# Patient Record
Sex: FEMALE | Race: AMERICAN INDIAN OR ALASKA NATIVE | Employment: OTHER | URBAN - METROPOLITAN AREA
[De-identification: names, ages, dates, MRNs, and addresses within clinical notes are randomized per-mention and may not be internally consistent; named-entity substitution may affect disease eponyms.]

---

## 2017-06-06 DIAGNOSIS — E03.9 HYPOTHYROIDISM, UNSPECIFIED TYPE: ICD-10-CM

## 2017-06-06 RX ORDER — LEVOTHYROXINE SODIUM 75 UG/1
75 TABLET ORAL DAILY
Qty: 90 TABLET | Refills: 0 | Status: SHIPPED | OUTPATIENT
Start: 2017-06-06 | End: 2017-08-21

## 2017-06-06 NOTE — TELEPHONE ENCOUNTER
Medication filled for 90 days to complete last refill order as pharmacy states their order is .    Last TSH WNL at 1.44 on 16.    Angle Rivera RN

## 2017-06-06 NOTE — TELEPHONE ENCOUNTER
Reason for Call:  Other     Detailed comments: [facility] is saying her Levothyroxine rx is   Looks like it should be good until , she has filled it 3 times  /, , and     Phone Number Patient can be reached at: Home number on file [phone] (home)    Best Time: anytime    Can we leave a detailed message on this number? YES    Call taken on 2017 at 8:17 AM by Maria C Duffy

## 2017-06-06 NOTE — TELEPHONE ENCOUNTER
I called [facility]  Our 2016 Rx was PRINTED.  They have that pt brought a written script in May 2016 so .      I called pt.  She doesn't know what  May is all about.  She said she gets her prescription in August  ONLY & ALWAYS.     Future appt schedule 17.  Please send in a levthyroxine script to tide her over.  Pended.

## 2017-08-21 ENCOUNTER — OFFICE VISIT (OUTPATIENT)
Dept: FAMILY MEDICINE | Facility: CLINIC | Age: 82
End: 2017-08-21
Payer: MEDICARE

## 2017-08-21 VITALS
BODY MASS INDEX: 24.1 KG/M2 | WEIGHT: 136 LBS | DIASTOLIC BLOOD PRESSURE: 60 MMHG | OXYGEN SATURATION: 96 % | SYSTOLIC BLOOD PRESSURE: 113 MMHG | HEIGHT: 63 IN | HEART RATE: 72 BPM | TEMPERATURE: 96.8 F

## 2017-08-21 DIAGNOSIS — R01.1 HEART MURMUR: ICD-10-CM

## 2017-08-21 DIAGNOSIS — Z00.00 ROUTINE GENERAL MEDICAL EXAMINATION AT A HEALTH CARE FACILITY: Primary | ICD-10-CM

## 2017-08-21 DIAGNOSIS — H91.93 UNSPECIFIED HEARING LOSS, BILATERAL: ICD-10-CM

## 2017-08-21 DIAGNOSIS — E03.9 HYPOTHYROIDISM, UNSPECIFIED TYPE: ICD-10-CM

## 2017-08-21 DIAGNOSIS — R42 LIGHTHEADEDNESS: ICD-10-CM

## 2017-08-21 DIAGNOSIS — D33.3 ACOUSTIC NEUROMA (H): ICD-10-CM

## 2017-08-21 LAB
ERYTHROCYTE [DISTWIDTH] IN BLOOD BY AUTOMATED COUNT: 14.1 % (ref 10–15)
HCT VFR BLD AUTO: 39.5 % (ref 35–47)
HGB BLD-MCNC: 12.9 G/DL (ref 11.7–15.7)
MCH RBC QN AUTO: 33.3 PG (ref 26.5–33)
MCHC RBC AUTO-ENTMCNC: 32.7 G/DL (ref 31.5–36.5)
MCV RBC AUTO: 102 FL (ref 78–100)
PLATELET # BLD AUTO: 228 10E9/L (ref 150–450)
RBC # BLD AUTO: 3.87 10E12/L (ref 3.8–5.2)
WBC # BLD AUTO: 8.7 10E9/L (ref 4–11)

## 2017-08-21 PROCEDURE — 84443 ASSAY THYROID STIM HORMONE: CPT | Performed by: INTERNAL MEDICINE

## 2017-08-21 PROCEDURE — 85027 COMPLETE CBC AUTOMATED: CPT | Performed by: INTERNAL MEDICINE

## 2017-08-21 PROCEDURE — G0439 PPPS, SUBSEQ VISIT: HCPCS | Performed by: INTERNAL MEDICINE

## 2017-08-21 PROCEDURE — 80061 LIPID PANEL: CPT | Performed by: INTERNAL MEDICINE

## 2017-08-21 PROCEDURE — 80053 COMPREHEN METABOLIC PANEL: CPT | Performed by: INTERNAL MEDICINE

## 2017-08-21 PROCEDURE — 36415 COLL VENOUS BLD VENIPUNCTURE: CPT | Performed by: INTERNAL MEDICINE

## 2017-08-21 RX ORDER — LEVOTHYROXINE SODIUM 75 UG/1
75 TABLET ORAL DAILY
Qty: 90 TABLET | Refills: 3 | Status: SHIPPED | OUTPATIENT
Start: 2017-08-21 | End: 2018-08-20

## 2017-08-21 NOTE — NURSING NOTE
"Chief Complaint   Patient presents with     Wellness Visit       Initial /60 (BP Location: Right arm, Cuff Size: Adult Regular)  Pulse 72  Temp 96.8  F (36  C) (Tympanic)  Ht 5' 2.75\" (1.594 m)  Wt 136 lb (61.7 kg)  SpO2 96%  Breastfeeding? No  BMI 24.28 kg/m2 Estimated body mass index is 24.28 kg/(m^2) as calculated from the following:    Height as of this encounter: 5' 2.75\" (1.594 m).    Weight as of this encounter: 136 lb (61.7 kg).  Medication Reconciliation: complete   Claudia Vann MA      "

## 2017-08-21 NOTE — MR AVS SNAPSHOT
After Visit Summary   8/21/2017    Teresa Bates    MRN: [id number]           Patient Information     Date Of Birth          10/9/1931        Visit Information        Provider Department      8/21/2017 10:00 AM Shravan Bourgeois MD [facility]        Today's Diagnoses     Routine general medical examination at a health care facility    -  1    Hypothyroidism, unspecified type        Acoustic neuroma (H)        Lightheadedness        Heart murmur        Unspecified hearing loss, bilateral          Care Instructions      Preventive Health Recommendations    Female Ages 65 +    Yearly exam:     See your health care provider every year in order to  o Review health changes.   o Discuss preventive care.    o Review your medicines if your doctor has prescribed any.      You no longer need a yearly Pap test unless you've had an abnormal Pap test in the past 10 years. If you have vaginal symptoms, such as bleeding or discharge, be sure to talk with your provider about a Pap test.      Every 1 to 2 years, have a mammogram.  If you are over 69, talk with your health care provider about whether or not you want to continue having screening mammograms.      Every 10 years, have a colonoscopy. Or, have a yearly FIT test (stool test). These exams will check for colon cancer.       Have a cholesterol test every 5 years, or more often if your doctor advises it.       Have a diabetes test (fasting glucose) every three years. If you are at risk for diabetes, you should have this test more often.       At age 65, have a bone density scan (DEXA) to check for osteoporosis (brittle bone disease).    Shots:    Get a flu shot each year.    Get a tetanus shot every 10 years.    Talk to your doctor about your pneumonia vaccines. There are now two you should receive - Pneumovax (PPSV 23) and Prevnar (PCV 13).    Talk to your doctor about the shingles vaccine.    Talk to your doctor about the hepatitis B  vaccine.    Nutrition:     Eat at least 5 servings of fruits and vegetables each day.      Eat whole-grain bread, whole-wheat pasta and brown rice instead of white grains and rice.      Talk to your provider about Calcium and Vitamin D.     Lifestyle    Exercise at least 150 minutes a week (30 minutes a day, 5 days a week). This will help you control your weight and prevent disease.      Limit alcohol to one drink per day.      No smoking.       Wear sunscreen to prevent skin cancer.       See your dentist twice a year for an exam and cleaning.      See your eye doctor every 1 to 2 years to screen for conditions such as glaucoma, macular degeneration and cataracts.          Follow-ups after your visit        Additional Services     OTOLARYNGOLOGY REFERRAL       Your provider has referred you to: [facility] [phone]   [email or web address]    Dr. Thomas; for hearing loss and hearing testing     Please be aware that coverage of these services is subject to the terms and limitations of your health insurance plan.  Call member services at your health plan with any benefit or coverage questions.      Please bring the following with you to your appointment:    (1) Any X-Rays, CTs or MRIs which have been performed.  Contact the facility where they were done to arrange for  prior to your scheduled appointment.   (2) List of current medications  (3) This referral request   (4) Any documents/labs given to you for this referral                  Future tests that were ordered for you today     Open Future Orders        Priority Expected Expires Ordered    Echocardiogram Complete Routine  8/21/2018 8/21/2017    MR Brain w/o & w Contrast Routine  8/21/2018 8/21/2017    US Carotid Bilateral Routine  8/21/2018 8/21/2017            Who to contact     If you have questions or need follow up information about today's clinic visit or your schedule please contact [facility]  [place] directly at [phone].  Normal or non-critical lab and imaging results will be communicated to you by MyChart, letter or phone within 4 business days after the clinic has received the results. If you do not hear from us within 7 days, please contact the clinic through [employer]hart or phone. If you have a critical or abnormal lab result, we will notify you by phone as soon as possible.  Submit refill requests through [facility] or call your pharmacy and they will forward the refill request to us. Please allow 3 business days for your refill to be completed.          Additional Information About Your Visit        [facility] Information     [facility] lets you send messages to your doctor, view your test results, renew your prescriptions, schedule appointments and more. To sign up, go to [email or web address] . Click on \"Log in\" on the left side of the screen, which will take you to the Welcome page. Then click on \"Sign up Now\" on the right side of the page.     You will be asked to enter the access code listed below, as well as some personal information. Please follow the directions to create your username and password.     Your access code is: [email or web address]  Expires: 2017 10:41 AM     Your access code will  in 90 days. If you need help or a new code, please call your [facility] clinic or [phone].        Care EveryWhere ID     This is your Care EveryWhere ID. This could be used by other organizations to access your [facility] medical records  [id number]        Your Vitals Were     Pulse Temperature Height Pulse Oximetry Breastfeeding? BMI (Body Mass Index)    72 96.8  F (36  C) (Tympanic) 5' 2.75\" (1.594 m) 96% No 24.28 kg/m2       Blood Pressure from Last 3 Encounters:   17 113/60   16 96/67   06/21/10 120/78    Weight from Last 3 Encounters:   17 136 lb (61.7 kg)   16 134 lb (60.8 kg)   06/21/10 131 lb (59.4 kg)              We Performed the Following     CBC with platelets     " Comprehensive metabolic panel     Lipid panel reflex to direct LDL     OTOLARYNGOLOGY REFERRAL     TSH          Where to get your medicines      These medications were sent to [id number] [place] - [address]  [address] [place] [address]     Phone:  [phone]     levothyroxine 75 MCG tablet          Primary Care Provider Office Phone # Fax #    Shravan Bourgeois [phone] [address] [place] [address]        Equal Access to Services     [facility] Center[facility]ONATAN : Hadii aad ku hadasho Soomaali, waaxda luqadaha, qaybta kaalmada adeegyada, waxay idiin hayaan john khmalcom lester . So [facility] [phone].    ATENCIÓN: Si habla español, tiene a landis disposición servicios gratuitos de asistencia lingüística. [facility] [phone].    We comply with applicable federal civil rights laws and [place] laws. We do not discriminate on the basis of race, color, national origin, age, disability sex, sexual orientation or gender identity.            Thank you!     Thank you for choosing [facility]  for your care. Our goal is always to provide you with excellent care. Hearing back from our patients is one way we can continue to improve our services. Please take a few minutes to complete the written survey that you may receive in the mail after your visit with us. Thank you!             Your Updated Medication List - Protect others around you: Learn how to safely use, store and throw away your medicines at www.disposemymeds.org.          This list is accurate as of: 8/21/17 10:41 AM.  Always use your most recent med list.                   Brand Name Dispense Instructions for use Diagnosis    aspirin 81 MG tablet      1 TABLET DAILY        calcium-vitamin D 600-400 MG-UNIT per tablet    CALTRATE     Take 1 tablet by mouth 2 times daily        levothyroxine 75 MCG tablet    SYNTHROID/LEVOTHROID    90 tablet    Take 1 tablet (75 mcg) by mouth daily    Hypothyroidism, unspecified type

## 2017-08-21 NOTE — LETTER
[address] [facility]  [address]  [place]  [address]  Tel: [phone]    August 22, 2017    Teresa Bates  [address]   [facility] [address]        Dear Ms. Bates,    The following letter pertains to your most recent diagnostic tests:    -Liver and gallbladder tests are normal for you. (ALT,AST, Alk phos, bilirubin), kidney function is normal for you (Creatinine, GFR), Sodium is normal, Potassium is normal for you, Calcium is normal for you, Glucose (blood sugar) is normal for you.      -Your cholesterol panel looks healthy.     -TSH (thyroid stimulating hormone) level is normal which indicates normal circulating thyroid hormone levels.      -Your complete blood counts including your hemoglobin returned normal for you.         Bottom line:  Your lab results look healthy       Sincerely,    Shravan Bourgeois MD/SHAHZAD      Enclosure: Lab Results  Results for orders placed or performed in visit on 08/21/17   Comprehensive metabolic panel   Result Value Ref Range    Sodium 139 133 - 144 mmol/L    Potassium 3.8 3.4 - 5.3 mmol/L    Chloride 103 94 - 109 mmol/L    Carbon Dioxide 27 20 - 32 mmol/L    Anion Gap 9 3 - 14 mmol/L    Glucose 87 70 - 99 mg/dL    Urea Nitrogen 18 7 - 30 mg/dL    Creatinine 0.76 0.52 - 1.04 mg/dL    GFR Estimate 72 >60 mL/min/1.7m2    GFR Estimate If Black 87 >60 mL/min/1.7m2    Calcium 8.6 8.5 - 10.1 mg/dL    Bilirubin Total 0.8 0.2 - 1.3 mg/dL    Albumin 3.8 3.4 - 5.0 g/dL    Protein Total 7.4 6.8 - 8.8 g/dL    Alkaline Phosphatase 61 40 - 150 U/L    ALT 29 0 - 50 U/L    AST 31 0 - 45 U/L   CBC with platelets   Result Value Ref Range    WBC 8.7 4.0 - 11.0 10e9/L    RBC Count 3.87 3.8 - 5.2 10e12/L    Hemoglobin 12.9 11.7 - 15.7 g/dL    Hematocrit 39.5 35.0 - 47.0 %     (H) 78 - 100 fl    MCH 33.3 (H) 26.5 - 33.0 pg    MCHC 32.7 31.5 - 36.5 g/dL    RDW 14.1 10.0 - 15.0 %    Platelet Count 228 150 - 450 10e9/L   Lipid panel reflex to direct LDL    Result Value Ref Range    Cholesterol 199 <200 mg/dL    Triglycerides 77 <150 mg/dL    HDL Cholesterol 95 >49 mg/dL    LDL Cholesterol Calculated 89 <100 mg/dL    Non HDL Cholesterol 104 <130 mg/dL   TSH   Result Value Ref Range    TSH 3.73 0.40 - 4.00 mU/L

## 2017-08-21 NOTE — PROGRESS NOTES
SUBJECTIVE:   Teresa Bates is a 85 year old female who presents for Preventive Visit.    Are you in the first 12 months of your Medicare Part B coverage?  No    Healthy Habits:    Do you get at least three servings of calcium containing foods daily (dairy, green leafy vegetables, etc.)? yes    Amount of exercise or daily activities, outside of work: 7 day(s) per week    Problems taking medications regularly No    Medication side effects: No    Have you had an eye exam in the past two years? yes    Do you see a dentist twice per year? yes    Do you have sleep apnea, excessive snoring or daytime drowsiness?no    COGNITIVE SCREEN  1) Repeat 3 items (Banana, Sunrise, Chair)    2) Clock draw: NORMAL  3) 3 item recall: Recalls 2 objects   Results: NORMAL clock, 1-2 items recalled: COGNITIVE IMPAIRMENT LESS LIKELY    Mini-CogTM Copyright S Abhi. Licensed by the author for use in [facility]; reprinted with permission (stacey@[facility]). All rights reserved.        She complains of an 8 month history of of not feeling well  She describes dyspnea with exertion without chest pain  She feels lightheadedness that is intermittent without syncope or near syncope; her friends have told her that she should have an ultrasound of her carotid arteries to check this out  She has trouble sleeping through the night, but she does not want to take medications for this   Her hearing in the right ear is getting worse      Reviewed and updated as needed this visit by clinical staff  Tobacco  Allergies  Meds  Med Hx  Surg Hx  Fam Hx  Soc Hx        Reviewed and updated as needed this visit by Provider  Tobacco  Med Hx  Surg Hx  Fam Hx  Soc Hx       Social History   Substance Use Topics     Smoking status: Former Smoker     Packs/day: 1.00     Years: 30.00     Types: Cigarettes     Smokeless tobacco: Never Used     Alcohol use No       The patient does not drink >3 drinks per day nor >7 drinks per week.    Today's  PHQ-2 Score:   PHQ-2 ( 1999 Pfizer) 8/24/2016   Q1: Little interest or pleasure in doing things 0   Q2: Feeling down, depressed or hopeless 0   PHQ-2 Score 0         Do you feel safe in your environment - Yes    Do you have a Health Care Directive?: Yes: Patient states has Advance Directive and will bring in a copy to clinic.    Current providers sharing in care for this patient include: Patient Care Team:  Shravan Bourgeois MD as PCP - General (Internal Medicine)      Hearing impairment: Yes, hearing managed by specialists    Ability to successfully perform activities of daily living: Yes, no assistance needed     Fall risk:         Home safety:  lack of grab bars in the bathroom      The following health maintenance items are reviewed in Epic and correct as of today:  Health Maintenance   Topic Date Due     ADVANCE DIRECTIVE PLANNING Q5 YRS  10/09/1986     FALL RISK ASSESSMENT  08/24/2017     INFLUENZA VACCINE (SYSTEM ASSIGNED)  09/01/2017     TETANUS IMMUNIZATION (SYSTEM ASSIGNED)  07/11/2021     PNEUMOCOCCAL  Completed     Patient Active Problem List   Diagnosis     Hypothyroidism, unspecified type     Osteopenia     Acoustic neuroma (H)     Past Surgical History:   Procedure Laterality Date     Stereotactic radiation to acustic neuroma on left with Dr. Galindo Left 05/10/200       Social History   Substance Use Topics     Smoking status: Former Smoker     Packs/day: 1.00     Years: 30.00     Types: Cigarettes     Smokeless tobacco: Never Used     Alcohol use No     Family History   Problem Relation Age of Onset     Family History Negative Mother      Myocardial Infarction Father      Multiple Sclerosis Daughter      OSTEOPOROSIS Daughter          Current Outpatient Prescriptions   Medication Sig Dispense Refill     levothyroxine (SYNTHROID/LEVOTHROID) 75 MCG tablet Take 1 tablet (75 mcg) by mouth daily 90 tablet 0     calcium-vitamin D (CALTRATE) 600-400 MG-UNIT per tablet Take 1 tablet by mouth 2 times daily    "    ASPIRIN 81 MG PO TABS 1 TABLET DAILY       No Known Allergies        ROS:  Constitutional, HEENT, cardiovascular, pulmonary, GI, , musculoskeletal, neuro, skin, endocrine and psych systems are negative, except as otherwise noted.      OBJECTIVE:   /60 (BP Location: Right arm, Cuff Size: Adult Regular)  Pulse 72  Temp 96.8  F (36  C) (Tympanic)  Ht 5' 2.75\" (1.594 m)  Wt 136 lb (61.7 kg)  SpO2 96%  Breastfeeding? No  BMI 24.28 kg/m2 Estimated body mass index is 24.28 kg/(m^2) as calculated from the following:    Height as of this encounter: 5' 2.75\" (1.594 m).    Weight as of this encounter: 136 lb (61.7 kg).  EXAM:   GENERAL APPEARANCE: healthy, alert and no distress  EYES: Eyes grossly normal to inspection, PERRL and conjunctivae and sclerae normal  HENT: ear canals and TM's normal, nose and mouth without ulcers or lesions, oropharynx clear and oral mucous membranes moist  NECK: no adenopathy, no asymmetry, masses, or scars and thyroid normal to palpation  RESP: lungs clear to auscultation - no rales, rhonchi or wheezes  BREAST: She has annual women's health check with GYN, so she declined breast exam today  CV: regular rate and rhythm, normal S1 S2, no S3 or S4, I hear a systolic 2/6 murmur at the RUSB, click or rub, no peripheral edema and peripheral pulses strong  ABDOMEN: soft, nontender, no hepatosplenomegaly, no masses and bowel sounds normal  MS: no musculoskeletal defects are noted and gait is age appropriate without ataxia  SKIN: no suspicious lesions or rashes  NEURO: Normal strength and tone, sensory exam grossly normal, mentation intact and speech normal; she is more hard of hearing than last year   PSYCH: mentation appears normal and affect normal/bright    ASSESSMENT / PLAN:   1. Routine general medical examination at a health care facility    - Comprehensive metabolic panel  - CBC with platelets  - Lipid panel reflex to direct LDL    2. Hypothyroidism, unspecified type    - " "levothyroxine (SYNTHROID/LEVOTHROID) 75 MCG tablet; Take 1 tablet (75 mcg) by mouth daily  Dispense: 90 tablet; Refill: 3  - TSH    3. Acoustic neuroma (H)    - MR Brain w/o & w Contrast; Future    4. Lightheadedness  Exclude high grade carotid disease, but this is an unlikely presentation for that  Her new murmur and HERNANDES and lightheadedness could result from new valvular disease check echo   - US Carotid Bilateral; Future  - Echocardiogram Complete; Future    5. Heart murmur    - Echocardiogram Complete; Future    6. Unspecified hearing loss, bilateral  I think she may want a hearing aid for her right ear, she cannot hear out of her left ear due to her neuroma   - OTOLARYNGOLOGY REFERRAL    End of Life Planning:  Patient currently has an advanced directive: Yes.  Practitioner is supportive of decision.    COUNSELING:  Reviewed preventive health counseling, as reflected in patient instructions  Special attention given to:       Regular exercise       Healthy diet/nutrition        Estimated body mass index is 24.28 kg/(m^2) as calculated from the following:    Height as of this encounter: 5' 2.75\" (1.594 m).    Weight as of this encounter: 136 lb (61.7 kg).     reports that she has quit smoking. Her smoking use included Cigarettes. She has a 30.00 pack-year smoking history. She has never used smokeless tobacco.        Appropriate preventive services were discussed with this patient, including applicable screening as appropriate for cardiovascular disease, diabetes, osteopenia/osteoporosis, and glaucoma.  As appropriate for age/gender, discussed screening for colorectal cancer, prostate cancer, breast cancer, and cervical cancer. Checklist reviewing preventive services available has been given to the patient.    Reviewed patients plan of care and provided an AVS. The Basic Care Plan (routine screening as documented in Health Maintenance) for Teresa meets the Care Plan requirement. This Care Plan has been established " and reviewed with the Patient.    Counseling Resources:  ATP IV Guidelines  Pooled Cohorts Equation Calculator  Breast Cancer Risk Calculator  FRAX Risk Assessment  ICSI Preventive Guidelines  Dietary Guidelines for Americans, 2010  USDA's MyPlate  ASA Prophylaxis  Lung CA Screening    Shravan Bourgeois MD  [facility]

## 2017-08-22 LAB
ALBUMIN SERPL-MCNC: 3.8 G/DL (ref 3.4–5)
ALP SERPL-CCNC: 61 U/L (ref 40–150)
ALT SERPL W P-5'-P-CCNC: 29 U/L (ref 0–50)
ANION GAP SERPL CALCULATED.3IONS-SCNC: 9 MMOL/L (ref 3–14)
AST SERPL W P-5'-P-CCNC: 31 U/L (ref 0–45)
BILIRUB SERPL-MCNC: 0.8 MG/DL (ref 0.2–1.3)
BUN SERPL-MCNC: 18 MG/DL (ref 7–30)
CALCIUM SERPL-MCNC: 8.6 MG/DL (ref 8.5–10.1)
CHLORIDE SERPL-SCNC: 103 MMOL/L (ref 94–109)
CHOLEST SERPL-MCNC: 199 MG/DL
CO2 SERPL-SCNC: 27 MMOL/L (ref 20–32)
CREAT SERPL-MCNC: 0.76 MG/DL (ref 0.52–1.04)
GFR SERPL CREATININE-BSD FRML MDRD: 72 ML/MIN/1.7M2
GLUCOSE SERPL-MCNC: 87 MG/DL (ref 70–99)
HDLC SERPL-MCNC: 95 MG/DL
LDLC SERPL CALC-MCNC: 89 MG/DL
NONHDLC SERPL-MCNC: 104 MG/DL
POTASSIUM SERPL-SCNC: 3.8 MMOL/L (ref 3.4–5.3)
PROT SERPL-MCNC: 7.4 G/DL (ref 6.8–8.8)
SODIUM SERPL-SCNC: 139 MMOL/L (ref 133–144)
TRIGL SERPL-MCNC: 77 MG/DL
TSH SERPL DL<=0.005 MIU/L-ACNC: 3.73 MU/L (ref 0.4–4)

## 2017-08-22 NOTE — PROGRESS NOTES
The following letter pertains to your most recent diagnostic tests:    -Liver and gallbladder tests are normal for you. (ALT,AST, Alk phos, bilirubin), kidney function is normal for you (Creatinine, GFR), Sodium is normal, Potassium is normal for you, Calcium is normal for you, Glucose (blood sugar) is normal for you.      -Your cholesterol panel looks healthy.     -TSH (thyroid stimulating hormone) level is normal which indicates normal circulating thyroid hormone levels.      -Your complete blood counts including your hemoglobin returned normal for you.         Bottom line:  Your lab results look healthy             Sincerely,    Dr. Bourgeois

## 2017-08-26 ENCOUNTER — HOSPITAL ENCOUNTER (OUTPATIENT)
Dept: ULTRASOUND IMAGING | Facility: CLINIC | Age: 82
End: 2017-08-26
Attending: INTERNAL MEDICINE
Payer: MEDICARE

## 2017-08-26 ENCOUNTER — HOSPITAL ENCOUNTER (OUTPATIENT)
Dept: MRI IMAGING | Facility: CLINIC | Age: 82
Discharge: HOME | End: 2017-08-26
Attending: INTERNAL MEDICINE | Admitting: INTERNAL MEDICINE
Payer: MEDICARE

## 2017-08-26 DIAGNOSIS — D33.3 ACOUSTIC NEUROMA (H): ICD-10-CM

## 2017-08-26 DIAGNOSIS — R42 LIGHTHEADEDNESS: ICD-10-CM

## 2017-08-26 PROCEDURE — A9585 GADOBUTROL INJECTION: HCPCS | Performed by: INTERNAL MEDICINE

## 2017-08-26 PROCEDURE — 70553 MRI BRAIN STEM W/O & W/DYE: CPT

## 2017-08-26 PROCEDURE — 93880 EXTRACRANIAL BILAT STUDY: CPT

## 2017-08-26 PROCEDURE — 25000128 H RX IP 250 OP 636: Performed by: INTERNAL MEDICINE

## 2017-08-26 RX ORDER — GADOBUTROL 604.72 MG/ML
6 INJECTION INTRAVENOUS ONCE
Status: COMPLETED | OUTPATIENT
Start: 2017-08-26 | End: 2017-08-26

## 2017-08-26 RX ADMIN — GADOBUTROL 6 ML: 604.72 INJECTION INTRAVENOUS at 10:53

## 2017-08-26 NOTE — LETTER
[facility]  [address] [facility]  [address]  [place]  [address]  Tel: [phone]    August 29, 2017    Teresa Bates  [address]   [facility] [address]        Dear Ms. Bates,    The following letter pertains to your most recent diagnostic tests:    Good news! 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    Sincerely,    Shravan Bourgeois MD/SHAHZAD    Enclosure: MRI  Results for orders placed or performed during the hospital encounter of 08/26/17   MR Brain w/o & w Contrast    Narrative    MRI OF THE BRAIN WITHOUT AND WITH CONTRAST;  MRI OF THE SKULL BASE WITHOUT AND WITH CONTRAST 8/26/2017 10:54 AM     COMPARISON: Brain MR 7/1/2015.     HISTORY:  Known acoustic neuroma followup. Benign neoplasm of cranial  nerves.    TECHNIQUE: Axial diffusion-weighted with ADC map, T2-weighted,  turboFLAIR and T1-weighted images of the brain and axial T1-weighted  and coronal T2-weighted with fat saturation images centered on the  internal auditory canals were obtained without intravenous contrast.  Following intravenous administration of IV gadolinium (6 mL Gadavist),  axial turboFLAIR images of the brain and axial T1-weighted with fat  saturation and coronal T1-weighted images centered on the internal  auditory canals were obtained.    FINDINGS: There is moderate diffuse cerebral volume loss. There are  numerous scattered focal and extensive confluent periventricular areas  of abnormal T2 signal hyperintensity in the cerebral white matter  bilaterally that are consistent with sequela of chronic small vessel  ischemic disease.     The ventricles and basal cisterns are within normal limits in  configuration given the degree of cerebral volume loss.  There is no  midline shift.  There are no extra-axial fluid collections.  There is  no evidence for stroke or acute intracranial hemorrhage.  There is no  abnormal contrast enhancement in the  brain or its coverings.    Again noted is an irregularly-shaped enhancing nodule completely  filling the left internal auditory canal and extending out into the  left cerebellopontine angle cistern measuring 2.0 x 1.2 cm in the AP  and transverse dimensions, respectively. This remains consistent with  a vestibular schwannoma and has not increased in size from the  comparison study. The contents of the right internal auditory canal  are within normal limits in contour and signal intensity with no  abnormal contrast enhancement.  The labyrinthine structures of the  inner ears bilaterally are normal in contour and signal intensity with  no abnormal contrast enhancement.    There is no sinusitis or mastoiditis.      Impression    IMPRESSION:   1. Diffuse cerebral volume loss and cerebral white matter changes  consistent with chronic small vessel ischemic disease.   2. Stable enhancing lesion in the left internal auditory canal  extending out into the left cerebellopontine angle cistern measuring  2.0 x 1.2 cm in size. This remains consistent with a vestibular  schwannoma.  3. No evidence for acute intracranial pathology. Overall, no  significant change from the comparison study.    TONYA CONNOR MD

## 2017-08-26 NOTE — LETTER
[address] [facility]  [address]  [place]  [address]  Tel: [phone]    August 29, 2017    Teresa Bates  [address]  [facility] [address]        Dear Ms. Bates,    Good news! Your carotid ultrasound does not show dangerous blockages in your carotid arteries.         Sincerely,     Dr. Bourgeois /NB    Results for orders placed or performed during the hospital encounter of 08/26/17   US Carotid Bilateral    Narrative    BILATERAL DUPLEX CAROTID ULTRASOUND 8/26/2017 11:39 AM     HISTORY: Dizziness and giddiness.    COMPARISON: None.    FINDINGS:  There is mild atherosclerotic plaque in the carotid  bifurcations.  Flow velocities and waveforms show less than 50%  diameter stenosis in both the right and left internal carotid arteries  as assessed by each ICA PSV and EDV and ICA/DCCA ratio.  Antegrade  flow is present in both vertebral and external carotid arteries.      Impression    IMPRESSION:  Less than 50% diameter stenosis of both internal carotid  arteries relative to the distal ICA diameters.    TONYA CONNOR MD

## 2017-08-28 ENCOUNTER — HOSPITAL ENCOUNTER (OUTPATIENT)
Dept: CARDIOLOGY | Facility: CLINIC | Age: 82
Discharge: HOME | End: 2017-08-28
Attending: INTERNAL MEDICINE | Admitting: INTERNAL MEDICINE
Payer: MEDICARE

## 2017-08-28 DIAGNOSIS — R01.1 HEART MURMUR: ICD-10-CM

## 2017-08-28 DIAGNOSIS — R42 LIGHTHEADEDNESS: ICD-10-CM

## 2017-08-28 PROBLEM — I35.8 AORTIC VALVE SCLEROSIS: Status: ACTIVE | Noted: 2017-08-28

## 2017-08-28 PROCEDURE — 93306 TTE W/DOPPLER COMPLETE: CPT

## 2017-08-28 PROCEDURE — 93306 TTE W/DOPPLER COMPLETE: CPT | Mod: 26 | Performed by: INTERNAL MEDICINE

## 2017-08-28 NOTE — LETTER
[facility]  [address]  [address]  [place]  [address]  Tel: [phone]    2017    Teresa Bates  [address]  [facility] [address]        Dear Ms. Bates,    Good news! Your echocardiogram shows no dangerous findings.  Your heart murmur is explained by a benign finding known as aortic sclerosis which does not require further evaluation or follow up.       Sincerely,     Dr. Bush /NB  Results for orders placed or performed during the hospital encounter of 17   Echocardiogram Complete    Narrative    [id number]  [id number]  [id number]  [id number]^ELEAZAR^CARLO^ELI        [facility]  [facility]  Echocardiography Laboratory  [address]  [address]  [place] [address]  Phone [phone]  Fax [phone]        Name: TERESA BATES  MRN: [id number]  : 10/09/1931  Study Date: 2017 09:25 AM  Age: 85 yrs  Gender: Female  [place] [facility] [place] For Study: Dizziness and giddiness, Cardiac murmur, unspecified  Ordering Physician: CARLO BUSH  Referring Physician: CARLO BUSH  Performed By: ASHANTI Borges     BSA: 1.6 m2  Height: 62 in  Weight: 136 lb  HR: 68  BP: 120/68 mmHg  _____________________________________________________________________________  __     Procedure  Complete Echo Adult.     _____________________________________________________________________________  __        Interpretation Summary     Left ventricular systolic function is normal.The visual ejection fraction is  estimated at 60-65%.  The right ventricular systolic function is normal.  Aortic valve sclerosis.  _____________________________________________________________________________  __        Left Ventricle  The left ventricle is normal in size. There is normal left ventricular wall  thickness. Left ventricular systolic function is normal. The visual ejection  fraction is estimated at 60-65%. The transmitral  spectral Doppler flow pattern  is suggestive of impaired LV relaxation. E by E prime ratio is between 8 and  15, which is indeterminate for assessment of left ventricular filling  pressures. No regional wall motion abnormalities noted.     Right Ventricle  The right ventricle is normal size. The right ventricular systolic function is  normal.     Atria  The left atrium is mildly dilated. Right atrial size is normal. There is no  color Doppler evidence of an atrial shunt.     Mitral Valve  There is mild mitral annular calcification. There is trace mitral  regurgitation.     Tricuspid Valve  There is trace tricuspid regurgitation. Right ventricular systolic pressure  could not be approximated due to inadequate tricuspid regurgitation.        Aortic Valve  The aortic valve is trileaflet. There is moderate aortic sclerosis of the non-  coronary cusp. There is trace aortic regurgitation. No aortic stenosis is  present.     Pulmonic Valve  There is trace pulmonic valvular regurgitation. There is no pulmonic valvular  stenosis.     Vessels  The aortic root is normal size. Normal size ascending aorta. The IVC is normal  in size and reactivity with respiration, suggesting normal central venous  pressure.     Pericardium  There is no pericardial effusion.     Rhythm  sinus.     _____________________________________________________________________________  __  MMode/2D Measurements & Calculations  IVSd: 0.62 cm  LVIDd: 4.0 cm  LVIDs: 2.7 cm  LVPWd: 0.71 cm  FS: 33.1 %  EDV(Teich): 71.4 ml  ESV(Teich): 27.0 ml  LV mass(C)d: 74.1 grams  LV mass(C)dI: 45.7 grams/m2  Ao root diam: 2.6 cm  LA dimension: 2.9 cm     asc Aorta Diam: 2.5 cm  LA/Ao: 1.1  LVOT diam: 2.0 cm  LVOT area: 3.2 cm2  LA Volume (BP): 43.8 ml  LA Volume Index (BP): 27.0 ml/m2        Doppler Measurements & Calculations  MV E max joe: 74.1 cm/sec  MV A max joe: 88.3 cm/sec  MV E/A: 0.84  MV dec time: 0.21 sec  Pulm Sys Joe: 63.4 cm/sec  Pulm Rivera Joe: 51.0  cm/sec  Pulm NAYELI Revs Joe: 30.0 cm/sec  Pulm S/D: 1.2  Lateral E/e': 12.4     Medial E/e': 11.7           _____________________________________________________________________________  __           Report approved by: Ethel Hernandez 08/28/2017 10:17 AM

## 2017-08-29 NOTE — PROGRESS NOTES
The following letter pertains to your most recent diagnostic tests:    Good news! 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              Sincerely,    Dr. Bourgeois

## 2017-08-29 NOTE — PROGRESS NOTES
The following letter pertains to your most recent diagnostic tests:    Good news! Your echocardiogram shows no dangerous findings.  Your heart murmur is explained by a benign finding known as aortic sclerosis which does not require further evaluation or follow up.              Sincerely,    Dr. Bourgeois

## 2017-08-29 NOTE — PROGRESS NOTES
The following letter pertains to your most recent diagnostic tests:    Good news! Your carotid ultrasound does not show dangerous blockages in your carotid arteries.                  Sincerely,    Dr. Bourgeois

## 2018-08-20 ENCOUNTER — OFFICE VISIT (OUTPATIENT)
Dept: FAMILY MEDICINE | Facility: CLINIC | Age: 83
End: 2018-08-20
Payer: MEDICARE

## 2018-08-20 VITALS
SYSTOLIC BLOOD PRESSURE: 118 MMHG | HEART RATE: 65 BPM | BODY MASS INDEX: 24.45 KG/M2 | WEIGHT: 138 LBS | DIASTOLIC BLOOD PRESSURE: 67 MMHG | OXYGEN SATURATION: 95 % | TEMPERATURE: 98.2 F | HEIGHT: 63 IN

## 2018-08-20 DIAGNOSIS — I35.8 AORTIC VALVE SCLEROSIS: ICD-10-CM

## 2018-08-20 DIAGNOSIS — R51.9 NONINTRACTABLE HEADACHE, UNSPECIFIED CHRONICITY PATTERN, UNSPECIFIED HEADACHE TYPE: ICD-10-CM

## 2018-08-20 DIAGNOSIS — R27.0 ATAXIA: ICD-10-CM

## 2018-08-20 DIAGNOSIS — D33.3 ACOUSTIC NEUROMA (H): ICD-10-CM

## 2018-08-20 DIAGNOSIS — E03.9 HYPOTHYROIDISM, UNSPECIFIED TYPE: ICD-10-CM

## 2018-08-20 DIAGNOSIS — Z00.00 ROUTINE GENERAL MEDICAL EXAMINATION AT A HEALTH CARE FACILITY: Primary | ICD-10-CM

## 2018-08-20 LAB
ALBUMIN SERPL-MCNC: 3.7 G/DL (ref 3.4–5)
ALP SERPL-CCNC: 57 U/L (ref 40–150)
ALT SERPL W P-5'-P-CCNC: 25 U/L (ref 0–50)
ANION GAP SERPL CALCULATED.3IONS-SCNC: 8 MMOL/L (ref 3–14)
AST SERPL W P-5'-P-CCNC: 26 U/L (ref 0–45)
BILIRUB SERPL-MCNC: 0.8 MG/DL (ref 0.2–1.3)
BUN SERPL-MCNC: 17 MG/DL (ref 7–30)
CALCIUM SERPL-MCNC: 8.6 MG/DL (ref 8.5–10.1)
CHLORIDE SERPL-SCNC: 106 MMOL/L (ref 94–109)
CHOLEST SERPL-MCNC: 184 MG/DL
CO2 SERPL-SCNC: 28 MMOL/L (ref 20–32)
CREAT SERPL-MCNC: 0.66 MG/DL (ref 0.52–1.04)
ERYTHROCYTE [DISTWIDTH] IN BLOOD BY AUTOMATED COUNT: 14.3 % (ref 10–15)
ERYTHROCYTE [SEDIMENTATION RATE] IN BLOOD BY WESTERGREN METHOD: 9 MM/H (ref 0–30)
GFR SERPL CREATININE-BSD FRML MDRD: 84 ML/MIN/1.7M2
GLUCOSE SERPL-MCNC: 96 MG/DL (ref 70–99)
HCT VFR BLD AUTO: 39.1 % (ref 35–47)
HDLC SERPL-MCNC: 77 MG/DL
HGB BLD-MCNC: 12.9 G/DL (ref 11.7–15.7)
LDLC SERPL CALC-MCNC: 94 MG/DL
MCH RBC QN AUTO: 33.6 PG (ref 26.5–33)
MCHC RBC AUTO-ENTMCNC: 33 G/DL (ref 31.5–36.5)
MCV RBC AUTO: 102 FL (ref 78–100)
NONHDLC SERPL-MCNC: 107 MG/DL
PLATELET # BLD AUTO: 190 10E9/L (ref 150–450)
POTASSIUM SERPL-SCNC: 3.9 MMOL/L (ref 3.4–5.3)
PROT SERPL-MCNC: 7 G/DL (ref 6.8–8.8)
RBC # BLD AUTO: 3.84 10E12/L (ref 3.8–5.2)
SODIUM SERPL-SCNC: 142 MMOL/L (ref 133–144)
TRIGL SERPL-MCNC: 65 MG/DL
TSH SERPL DL<=0.005 MIU/L-ACNC: 2.24 MU/L (ref 0.4–4)
WBC # BLD AUTO: 6.2 10E9/L (ref 4–11)

## 2018-08-20 PROCEDURE — 84443 ASSAY THYROID STIM HORMONE: CPT | Performed by: INTERNAL MEDICINE

## 2018-08-20 PROCEDURE — 85652 RBC SED RATE AUTOMATED: CPT | Performed by: INTERNAL MEDICINE

## 2018-08-20 PROCEDURE — G0439 PPPS, SUBSEQ VISIT: HCPCS | Performed by: INTERNAL MEDICINE

## 2018-08-20 PROCEDURE — 99214 OFFICE O/P EST MOD 30 MIN: CPT | Mod: 25 | Performed by: INTERNAL MEDICINE

## 2018-08-20 PROCEDURE — 85027 COMPLETE CBC AUTOMATED: CPT | Performed by: INTERNAL MEDICINE

## 2018-08-20 PROCEDURE — 80053 COMPREHEN METABOLIC PANEL: CPT | Performed by: INTERNAL MEDICINE

## 2018-08-20 PROCEDURE — 80061 LIPID PANEL: CPT | Performed by: INTERNAL MEDICINE

## 2018-08-20 PROCEDURE — 36415 COLL VENOUS BLD VENIPUNCTURE: CPT | Performed by: INTERNAL MEDICINE

## 2018-08-20 RX ORDER — LEVOTHYROXINE SODIUM 75 UG/1
75 TABLET ORAL DAILY
Qty: 90 TABLET | Refills: 3 | Status: SHIPPED | OUTPATIENT
Start: 2018-08-20 | End: 2019-08-22

## 2018-08-20 NOTE — LETTER
[address]  [address]  [place]  [address]  Tel: [phone]    August 20, 2018    Teresa Bates  [address]   [facility] [address]        Dear Ms. Bates,    The following letter pertains to your most recent diagnostic tests:     -TSH (thyroid stimulating hormone) level is normal which indicates normal circulating thyroid hormone levels.       -Your cholesterol panel looks healthy.     -Liver and gallbladder tests are normal for you. (ALT,AST, Alk phos, bilirubin), kidney function is normal for you (Creatinine, GFR), Sodium is normal, Potassium is normal for you, Calcium is normal for you, Glucose (blood sugar) is normal for you.       -Your complete blood counts including your hemoglobin returned normal for you.             Sincerely,     Dr. Bourgeois/francisco    Results for orders placed or performed in visit on 08/20/18   Comprehensive metabolic panel   Result Value Ref Range    Sodium 142 133 - 144 mmol/L    Potassium 3.9 3.4 - 5.3 mmol/L    Chloride 106 94 - 109 mmol/L    Carbon Dioxide 28 20 - 32 mmol/L    Anion Gap 8 3 - 14 mmol/L    Glucose 96 70 - 99 mg/dL    Urea Nitrogen 17 7 - 30 mg/dL    Creatinine 0.66 0.52 - 1.04 mg/dL    GFR Estimate 84 >60 mL/min/1.7m2    GFR Estimate If Black >90 >60 mL/min/1.7m2    Calcium 8.6 8.5 - 10.1 mg/dL    Bilirubin Total 0.8 0.2 - 1.3 mg/dL    Albumin 3.7 3.4 - 5.0 g/dL    Protein Total 7.0 6.8 - 8.8 g/dL    Alkaline Phosphatase 57 40 - 150 U/L    ALT 25 0 - 50 U/L    AST 26 0 - 45 U/L   CBC with platelets   Result Value Ref Range    WBC 6.2 4.0 - 11.0 10e9/L    RBC Count 3.84 3.8 - 5.2 10e12/L    Hemoglobin 12.9 11.7 - 15.7 g/dL    Hematocrit 39.1 35.0 - 47.0 %     (H) 78 - 100 fl    MCH 33.6 (H) 26.5 - 33.0 pg    MCHC 33.0 31.5 - 36.5 g/dL    RDW 14.3 10.0 - 15.0 %    Platelet Count 190 150 - 450 10e9/L   TSH   Result Value Ref Range    TSH 2.24 0.40 - 4.00 mU/L   Lipid panel reflex to direct LDL Fasting   Result  Value Ref Range    Cholesterol 184 <200 mg/dL    Triglycerides 65 <150 mg/dL    HDL Cholesterol 77 >49 mg/dL    LDL Cholesterol Calculated 94 <100 mg/dL    Non HDL Cholesterol 107 <130 mg/dL   Erythrocyte sedimentation rate auto   Result Value Ref Range    Sed Rate 9 0 - 30 mm/h         Enclosure: Lab Results

## 2018-08-20 NOTE — PATIENT INSTRUCTIONS
"Please call [facility] at [phone] to schedule your MRI test.        You should get the new shingles vaccine \"SHINGRIX\" (not Zostavax) at your pharmacy.             Preventive Health Recommendations    Female Ages 65 +    Yearly exam:     See your health care provider every year in order to  o Review health changes.   o Discuss preventive care.    o Review your medicines if your doctor has prescribed any.      You no longer need a yearly Pap test unless you've had an abnormal Pap test in the past 10 years. If you have vaginal symptoms, such as bleeding or discharge, be sure to talk with your provider about a Pap test.      Every 1 to 2 years, have a mammogram.  If you are over 69, talk with your health care provider about whether or not you want to continue having screening mammograms.      Every 10 years, have a colonoscopy. Or, have a yearly FIT test (stool test). These exams will check for colon cancer.       Have a cholesterol test every 5 years, or more often if your doctor advises it.       Have a diabetes test (fasting glucose) every three years. If you are at risk for diabetes, you should have this test more often.       At age 65, have a bone density scan (DEXA) to check for osteoporosis (brittle bone disease).    Shots:    Get a flu shot each year.    Get a tetanus shot every 10 years.    Talk to your doctor about your pneumonia vaccines. There are now two you should receive - Pneumovax (PPSV 23) and Prevnar (PCV 13).    Talk to your pharmacist about the shingles vaccine.    Talk to your doctor about the hepatitis B vaccine.    Nutrition:     Eat at least 5 servings of fruits and vegetables each day.      Eat whole-grain bread, whole-wheat pasta and brown rice instead of white grains and rice.      Get adequate Calcium and Vitamin D.     Lifestyle    Exercise at least 150 minutes a week (30 minutes a day, 5 days a week). This will help you control your weight and prevent disease.      Limit " alcohol to one drink per day.      No smoking.       Wear sunscreen to prevent skin cancer.       See your dentist twice a year for an exam and cleaning.      See your eye doctor every 1 to 2 years to screen for conditions such as glaucoma, macular degeneration and cataracts.

## 2018-08-20 NOTE — PROGRESS NOTES
"    SUBJECTIVE:   Teresa Bates is a 86 year old female who presents for Preventive Visit.    Are you in the first 12 months of your Medicare Part B coverage?  No    Healthy Habits:    Do you get at least three servings of calcium containing foods daily (dairy, green leafy vegetables, etc.)? yes and  taking calcium and/or vitamin D supplement: yes     Amount of exercise or daily activities, outside of work: 6-7 day(s) per week    Problems taking medications regularly No    Medication side effects: No    Have you had an eye exam in the past two years? yes    Do you see a dentist twice per year? 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 Does not wear hearing aids - tried them twice but couldn't tolerate.    Fall risk:  Fallen 2 or more times in the past year?: No  Any fall with injury in the past year?: No      2 month history of pressure on right head near right ear progressive now moderate to severe   She believes this may be contributing to her feeling \"not as secure on me feet as I used to be\"  No recent falls     Reviewed and updated as needed this visit by clinical staff  Tobacco  Allergies         Reviewed and updated as needed this visit by Provider        Social History   Substance Use Topics     Smoking status: Former Smoker     Packs/day: 1.00     Years: 30.00     Types: Cigarettes     Smokeless tobacco: Never Used     Alcohol use No       If you drink alcohol do you typically have >3 drinks per day or >7 drinks per week? No                        Today's PHQ-2 Score:   PHQ-2 ( 1999 Pfizer) 8/20/2018 8/24/2016   Q1: Little interest or pleasure in doing things 0 0   Q2: Feeling down, depressed or hopeless 0 0   PHQ-2 Score 0 0       Do you feel " safe in your environment - Yes    Do you have a Health Care Directive?: Yes: Patient states has Advance Directive and will bring in a copy to clinic.    Current providers sharing in care for this patient include:   Patient Care Team:  Shravan Bourgeois MD as PCP - General (Internal Medicine)    The following health maintenance items are reviewed in Epic and correct as of today:  Health Maintenance   Topic Date Due     ADVANCE DIRECTIVE PLANNING Q5 YRS  10/09/1986     FALL RISK ASSESSMENT  08/24/2017     PHQ-2 Q1 YR  08/24/2017     INFLUENZA VACCINE (1) 09/01/2018     TETANUS IMMUNIZATION (SYSTEM ASSIGNED)  07/11/2021     PNEUMOCOCCAL  Completed     BP Readings from Last 3 Encounters:   08/20/18 118/67   08/21/17 113/60   08/24/16 96/67    Wt Readings from Last 3 Encounters:   08/20/18 138 lb (62.6 kg)   08/21/17 136 lb (61.7 kg)   08/24/16 134 lb (60.8 kg)                  Patient Active Problem List   Diagnosis     Hypothyroidism, unspecified type     Osteopenia     Acoustic neuroma (H)     Aortic valve sclerosis     Heart murmur     Past Surgical History:   Procedure Laterality Date     Stereotactic radiation to acustic neuroma on left with Dr. Galindo Left 05/10/200       Social History   Substance Use Topics     Smoking status: Former Smoker     Packs/day: 1.00     Years: 30.00     Types: Cigarettes     Smokeless tobacco: Never Used     Alcohol use No     Family History   Problem Relation Age of Onset     Family History Negative Mother      Myocardial Infarction Father      Multiple Sclerosis Daughter      Osteoperosis Daughter          Current Outpatient Prescriptions   Medication Sig Dispense Refill     ASPIRIN 81 MG PO TABS 1 TABLET DAILY       calcium-vitamin D (CALTRATE) 600-400 MG-UNIT per tablet Take 1 tablet by mouth 2 times daily       Multiple Vitamins-Minerals (CENTRUM SILVER 50+WOMEN PO)        levothyroxine (SYNTHROID/LEVOTHROID) 75 MCG tablet Take 1 tablet (75 mcg) by mouth daily 90 tablet 3  "          ROS:  Constitutional, HEENT, cardiovascular, pulmonary, gi and gu systems are negative, except as otherwise noted.    OBJECTIVE:   /67 (BP Location: Right arm, Cuff Size: Adult Regular)  Pulse 65  Temp 98.2  F (36.8  C) (Tympanic)  Ht 5' 2.75\" (1.594 m)  Wt 138 lb (62.6 kg)  SpO2 95%  Breastfeeding? No  BMI 24.64 kg/m2 Estimated body mass index is 24.64 kg/(m^2) as calculated from the following:    Height as of this encounter: 5' 2.75\" (1.594 m).    Weight as of this encounter: 138 lb (62.6 kg).  EXAM:   GENERAL APPEARANCE: healthy, alert and no distress  EYES: Eyes grossly normal to inspection, PERRL and conjunctivae and sclerae normal  HENT: ear canals and TM's normal, nose and mouth without ulcers or lesions, oropharynx clear and oral mucous membranes moist  NECK: no adenopathy, no asymmetry, masses, or scars and thyroid normal to palpation  RESP: lungs clear to auscultation - no rales, rhonchi or wheezes  BREAST: She has annual women's health check with GYN, so she declined breast exam today  CV: regular rate and rhythm, normal S1 S2, no S3 or S4, I hear a systolic 2/6 murmur at the RUSB, click or rub, no peripheral edema and peripheral pulses strong  ABDOMEN: soft, nontender, no hepatosplenomegaly, no masses and bowel sounds normal  MS: no musculoskeletal defects are noted and gait is age appropriate without ataxia  SKIN: no suspicious lesions or rashes  NEURO: Cranial nerves 2-12 appear grossly intact.   Alert and oriented to person, place and time. Symmetric strength.  No pronator drift.  Normal gait.  Negative Romberg test.  She passes the \"get up and go test\".    PSYCH: mentation appears normal and affect normal/bright    Diagnostic Test Results:  Labs/MRI pending     ASSESSMENT / PLAN:   1. Routine general medical examination at a health care facility    - Lipid panel reflex to direct LDL Fasting    2. Nonintractable headache, unspecified chronicity pattern, unspecified headache " "type  New head pain symptoms associated with gait problems, even though she is not due for surveillance of left acoustic neuroma, I think we should check a brain MRI to evaluate new right sided symptoms ; check ESR too, to risk stratify for PMR/GCA  - MR Brain w/o & w Contrast; Future    3. Ataxia  See discussion above   - MR Brain w/o & w Contrast; Future    4. Acoustic neuroma (H)  Stable last year     5. Hypothyroidism, unspecified type    - levothyroxine (SYNTHROID/LEVOTHROID) 75 MCG tablet; Take 1 tablet (75 mcg) by mouth daily  Dispense: 90 tablet; Refill: 3  - Comprehensive metabolic panel  - CBC with platelets  - TSH    6. Aortic valve sclerosis        End of Life Planning:  Patient currently has an advanced directive: Yes.  Practitioner is supportive of decision.    COUNSELING:  Reviewed preventive health counseling, as reflected in patient instructions  Special attention given to:       Immunizations    Shingrix recommended           BP Readings from Last 1 Encounters:   08/20/18 118/67     Estimated body mass index is 24.64 kg/(m^2) as calculated from the following:    Height as of this encounter: 5' 2.75\" (1.594 m).    Weight as of this encounter: 138 lb (62.6 kg).           reports that she has quit smoking. Her smoking use included Cigarettes. She has a 30.00 pack-year smoking history. She has never used smokeless tobacco.      Appropriate preventive services were discussed with this patient, including applicable screening as appropriate for cardiovascular disease, diabetes, osteopenia/osteoporosis, and glaucoma.  As appropriate for age/gender, discussed screening for colorectal cancer, prostate cancer, breast cancer, and cervical cancer. Checklist reviewing preventive services available has been given to the patient.    Reviewed patients plan of care and provided an AVS. The Basic Care Plan (routine screening as documented in Health Maintenance) for Teresa meets the Care Plan requirement. This Care " Plan has been established and reviewed with the Patient.    Counseling Resources:  ATP IV Guidelines  Pooled Cohorts Equation Calculator  Breast Cancer Risk Calculator  FRAX Risk Assessment  ICSI Preventive Guidelines  Dietary Guidelines for Americans, 2010  USDA's MyPlate  ASA Prophylaxis  Lung CA Screening    Shravan Bourgeois MD  [facility]

## 2018-08-20 NOTE — MR AVS SNAPSHOT
"              After Visit Summary   8/20/2018    Teresa Bates    MRN: [id number]           Patient Information     Date Of Birth          10/9/1931        Visit Information        Provider Department      8/20/2018 10:00 AM Shravan Bourgeois MD [facility]        Today's Diagnoses     Routine general medical examination at a health care facility    -  1    Nonintractable headache, unspecified chronicity pattern, unspecified headache type        Ataxia        Acoustic neuroma (H)        Hypothyroidism, unspecified type        Aortic valve sclerosis          Care Instructions    Please call [facility] at [phone] to schedule your MRI test.        You should get the new shingles vaccine \"SHINGRIX\" (not Zostavax) at your pharmacy.             Preventive Health Recommendations    Female Ages 65 +    Yearly exam:     See your health care provider every year in order to  o Review health changes.   o Discuss preventive care.    o Review your medicines if your doctor has prescribed any.      You no longer need a yearly Pap test unless you've had an abnormal Pap test in the past 10 years. If you have vaginal symptoms, such as bleeding or discharge, be sure to talk with your provider about a Pap test.      Every 1 to 2 years, have a mammogram.  If you are over 69, talk with your health care provider about whether or not you want to continue having screening mammograms.      Every 10 years, have a colonoscopy. Or, have a yearly FIT test (stool test). These exams will check for colon cancer.       Have a cholesterol test every 5 years, or more often if your doctor advises it.       Have a diabetes test (fasting glucose) every three years. If you are at risk for diabetes, you should have this test more often.       At age 65, have a bone density scan (DEXA) to check for osteoporosis (brittle bone disease).    Shots:    Get a flu shot each year.    Get a tetanus shot every 10 years.    Talk to your " doctor about your pneumonia vaccines. There are now two you should receive - Pneumovax (PPSV 23) and Prevnar (PCV 13).    Talk to your pharmacist about the shingles vaccine.    Talk to your doctor about the hepatitis B vaccine.    Nutrition:     Eat at least 5 servings of fruits and vegetables each day.      Eat whole-grain bread, whole-wheat pasta and brown rice instead of white grains and rice.      Get adequate Calcium and Vitamin D.     Lifestyle    Exercise at least 150 minutes a week (30 minutes a day, 5 days a week). This will help you control your weight and prevent disease.      Limit alcohol to one drink per day.      No smoking.       Wear sunscreen to prevent skin cancer.       See your dentist twice a year for an exam and cleaning.      See your eye doctor every 1 to 2 years to screen for conditions such as glaucoma, macular degeneration and cataracts.          Follow-ups after your visit        Follow-up notes from your care team     Return in about 1 year (around 8/20/2019) for Physical Exam.      Future tests that were ordered for you today     Open Future Orders        Priority Expected Expires Ordered    MR Brain w/o & w Contrast Routine  8/20/2019 8/20/2018            Who to contact     If you have questions or need follow up information about today's clinic visit or your schedule please contact [facility] directly at [phone].  Normal or non-critical lab and imaging results will be communicated to you by MyChart, letter or phone within 4 business days after the clinic has received the results. If you do not hear from us within 7 days, please contact the clinic through MyChart or phone. If you have a critical or abnormal lab result, we will notify you by phone as soon as possible.  Submit refill requests through [employer] or call your pharmacy and they will forward the refill request to us. Please allow 3 business days for your refill to be completed.          Additional Information  "About Your Visit        Care EveryWhere ID     This is your Care EveryWhere ID. This could be used by other organizations to access your [facility] medical records  [id number]        Your Vitals Were     Pulse Temperature Height Pulse Oximetry Breastfeeding? BMI (Body Mass Index)    65 98.2  F (36.8  C) (Tympanic) 5' 2.75\" (1.594 m) 95% No 24.64 kg/m2       Blood Pressure from Last 3 Encounters:   08/20/18 118/67   08/21/17 113/60   08/24/16 96/67    Weight from Last 3 Encounters:   08/20/18 138 lb (62.6 kg)   08/21/17 136 lb (61.7 kg)   08/24/16 134 lb (60.8 kg)              We Performed the Following     CBC with platelets     Comprehensive metabolic panel     Erythrocyte sedimentation rate auto     Lipid panel reflex to direct LDL Fasting     TSH          Where to get your medicines      These medications were sent to [facility] - [place], [place] - [address]  [address] [address]     Phone:  [phone]     levothyroxine 75 MCG tablet          Primary Care Provider Office Phone # Fax #    Shravan Bourgeois [phone] [address]  [place] [address]        Equal Access to Services     [facility] Hospital[facility]ATAN : Hadii aad ku hadasho Soomaali, waaxda luqadaha, qaybta kaalmada adeegyada, waxay idiin haytitus lester . So [facility] [phone].    ATENCIÓN: Si habla español, tiene a landis disposición servicios gratuitos de asistencia lingüística. Llame al [phone].    We comply with applicable federal civil rights laws and [place] laws. We do not discriminate on the basis of race, color, national origin, age, disability, sex, sexual orientation, or gender identity.            Thank you!     Thank you for choosing [facility]  for your care. Our goal is always to provide you with excellent care. Hearing back from our patients is one way we can continue to improve our services. Please take a few minutes to complete the written survey that you may receive in " the mail after your visit with us. Thank you!             Your Updated Medication List - Protect others around you: Learn how to safely use, store and throw away your medicines at www.disposemymeds.org.          This list is accurate as of 8/20/18 10:15 AM.  Always use your most recent med list.                   Brand Name Dispense Instructions for use Diagnosis    aspirin 81 MG tablet      1 TABLET DAILY        calcium-vitamin D 600-400 MG-UNIT per tablet    CALTRATE     Take 1 tablet by mouth 2 times daily        CENTRUM SILVER 50+WOMEN PO           levothyroxine 75 MCG tablet    SYNTHROID/LEVOTHROID    90 tablet    Take 1 tablet (75 mcg) by mouth daily    Hypothyroidism, unspecified type

## 2019-08-22 ENCOUNTER — OFFICE VISIT (OUTPATIENT)
Dept: FAMILY MEDICINE | Facility: CLINIC | Age: 84
End: 2019-08-22
Payer: MEDICARE

## 2019-08-22 VITALS
DIASTOLIC BLOOD PRESSURE: 61 MMHG | HEIGHT: 62 IN | SYSTOLIC BLOOD PRESSURE: 114 MMHG | WEIGHT: 137 LBS | HEART RATE: 72 BPM | OXYGEN SATURATION: 96 % | BODY MASS INDEX: 25.21 KG/M2 | TEMPERATURE: 97.9 F

## 2019-08-22 DIAGNOSIS — E03.9 HYPOTHYROIDISM, UNSPECIFIED TYPE: ICD-10-CM

## 2019-08-22 DIAGNOSIS — I35.8 AORTIC VALVE SCLEROSIS: ICD-10-CM

## 2019-08-22 DIAGNOSIS — D33.3 ACOUSTIC NEUROMA (H): ICD-10-CM

## 2019-08-22 DIAGNOSIS — Z00.00 ROUTINE GENERAL MEDICAL EXAMINATION AT A HEALTH CARE FACILITY: Primary | ICD-10-CM

## 2019-08-22 LAB
ALBUMIN SERPL-MCNC: 3.8 G/DL (ref 3.4–5)
ALP SERPL-CCNC: 61 U/L (ref 40–150)
ALT SERPL W P-5'-P-CCNC: 27 U/L (ref 0–50)
ANION GAP SERPL CALCULATED.3IONS-SCNC: 6 MMOL/L (ref 3–14)
AST SERPL W P-5'-P-CCNC: 29 U/L (ref 0–45)
BILIRUB SERPL-MCNC: 0.9 MG/DL (ref 0.2–1.3)
BUN SERPL-MCNC: 14 MG/DL (ref 7–30)
CALCIUM SERPL-MCNC: 8.6 MG/DL (ref 8.5–10.1)
CHLORIDE SERPL-SCNC: 108 MMOL/L (ref 94–109)
CHOLEST SERPL-MCNC: 182 MG/DL
CO2 SERPL-SCNC: 27 MMOL/L (ref 20–32)
CREAT SERPL-MCNC: 0.64 MG/DL (ref 0.52–1.04)
ERYTHROCYTE [DISTWIDTH] IN BLOOD BY AUTOMATED COUNT: 14.2 % (ref 10–15)
GFR SERPL CREATININE-BSD FRML MDRD: 80 ML/MIN/{1.73_M2}
GLUCOSE SERPL-MCNC: 92 MG/DL (ref 70–99)
HCT VFR BLD AUTO: 40 % (ref 35–47)
HDLC SERPL-MCNC: 73 MG/DL
HGB BLD-MCNC: 13.4 G/DL (ref 11.7–15.7)
LDLC SERPL CALC-MCNC: 94 MG/DL
MCH RBC QN AUTO: 34.5 PG (ref 26.5–33)
MCHC RBC AUTO-ENTMCNC: 33.5 G/DL (ref 31.5–36.5)
MCV RBC AUTO: 103 FL (ref 78–100)
NONHDLC SERPL-MCNC: 109 MG/DL
PLATELET # BLD AUTO: 214 10E9/L (ref 150–450)
POTASSIUM SERPL-SCNC: 4.1 MMOL/L (ref 3.4–5.3)
PROT SERPL-MCNC: 7.5 G/DL (ref 6.8–8.8)
RBC # BLD AUTO: 3.88 10E12/L (ref 3.8–5.2)
SODIUM SERPL-SCNC: 141 MMOL/L (ref 133–144)
TRIGL SERPL-MCNC: 74 MG/DL
TSH SERPL DL<=0.005 MIU/L-ACNC: 3.16 MU/L (ref 0.4–4)
WBC # BLD AUTO: 7.9 10E9/L (ref 4–11)

## 2019-08-22 PROCEDURE — 80061 LIPID PANEL: CPT | Performed by: INTERNAL MEDICINE

## 2019-08-22 PROCEDURE — 36415 COLL VENOUS BLD VENIPUNCTURE: CPT | Performed by: INTERNAL MEDICINE

## 2019-08-22 PROCEDURE — 80053 COMPREHEN METABOLIC PANEL: CPT | Performed by: INTERNAL MEDICINE

## 2019-08-22 PROCEDURE — 85027 COMPLETE CBC AUTOMATED: CPT | Performed by: INTERNAL MEDICINE

## 2019-08-22 PROCEDURE — 84443 ASSAY THYROID STIM HORMONE: CPT | Performed by: INTERNAL MEDICINE

## 2019-08-22 PROCEDURE — G0439 PPPS, SUBSEQ VISIT: HCPCS | Performed by: INTERNAL MEDICINE

## 2019-08-22 PROCEDURE — 99213 OFFICE O/P EST LOW 20 MIN: CPT | Mod: 25 | Performed by: INTERNAL MEDICINE

## 2019-08-22 RX ORDER — LEVOTHYROXINE SODIUM 75 UG/1
75 TABLET ORAL DAILY
Qty: 90 TABLET | Refills: 3 | Status: SHIPPED | OUTPATIENT
Start: 2019-08-22 | End: 2020-07-02

## 2019-08-22 ASSESSMENT — MIFFLIN-ST. JEOR: SCORE: 1012.86

## 2019-08-22 ASSESSMENT — ACTIVITIES OF DAILY LIVING (ADL): CURRENT_FUNCTION: NO ASSISTANCE NEEDED

## 2019-08-22 NOTE — LETTER
[place] [address] [facility] [address] [facility]  [address]  [place]  [address]  Tel: [phone]    August 26, 2019    Teresa Bates  [address]   [facility] [address]        Dear Ms. Jana,    The following letter pertains to your most recent diagnostic tests:    -Your cholesterol panel looks healthy.     -TSH (thyroid stimulating hormone) level is normal which indicates normal circulating thyroid hormone levels.      -Liver and gallbladder tests are normal for you. (ALT,AST, Alk phos, bilirubin), kidney function is normal for you (Creatinine, GFR), Sodium is normal, Potassium is normal for you, Calcium is normal for you, Glucose (blood sugar) is normal for you.      -Your complete blood counts including your hemoglobin returned normal for you.         Bottom line:  Lab results look healthy. Schedule your MRI test!   Please call [facility] at [phone] to schedule your MRI.         Follow up:  I will contact you with your MRI results when they are available.         Sincerely,    Dr. Bourgeois/SHAHZAD        Enclosure: Lab Results  Results for orders placed or performed in visit on 08/22/19   Comprehensive metabolic panel   Result Value Ref Range    Sodium 141 133 - 144 mmol/L    Potassium 4.1 3.4 - 5.3 mmol/L    Chloride 108 94 - 109 mmol/L    Carbon Dioxide 27 20 - 32 mmol/L    Anion Gap 6 3 - 14 mmol/L    Glucose 92 70 - 99 mg/dL    Urea Nitrogen 14 7 - 30 mg/dL    Creatinine 0.64 0.52 - 1.04 mg/dL    GFR Estimate 80 >60 mL/min/[1.73_m2]    GFR Estimate If Black >90 >60 mL/min/[1.73_m2]    Calcium 8.6 8.5 - 10.1 mg/dL    Bilirubin Total 0.9 0.2 - 1.3 mg/dL    Albumin 3.8 3.4 - 5.0 g/dL    Protein Total 7.5 6.8 - 8.8 g/dL    Alkaline Phosphatase 61 40 - 150 U/L    ALT 27 0 - 50 U/L    AST 29 0 - 45 U/L   CBC with platelets   Result Value Ref Range    WBC 7.9 4.0 - 11.0 10e9/L    RBC Count 3.88 3.8 - 5.2 10e12/L    Hemoglobin 13.4 11.7 - 15.7 g/dL    Hematocrit 40.0 35.0 - 47.0 %    MCV  103 (H) 78 - 100 fl    MCH 34.5 (H) 26.5 - 33.0 pg    MCHC 33.5 31.5 - 36.5 g/dL    RDW 14.2 10.0 - 15.0 %    Platelet Count 214 150 - 450 10e9/L   Lipid panel reflex to direct LDL Fasting   Result Value Ref Range    Cholesterol 182 <200 mg/dL    Triglycerides 74 <150 mg/dL    HDL Cholesterol 73 >49 mg/dL    LDL Cholesterol Calculated 94 <100 mg/dL    Non HDL Cholesterol 109 <130 mg/dL   TSH with free T4 reflex   Result Value Ref Range    TSH 3.16 0.40 - 4.00 mU/L

## 2019-08-22 NOTE — PATIENT INSTRUCTIONS
"You should get the new shingles vaccine series \"SHINGRIX\" (not Zostavax) at a pharmacy.      Please call [facility] at [phone] to schedule your brain MRI test.   "

## 2019-08-22 NOTE — PROGRESS NOTES
"SUBJECTIVE:   Teresa Bates is a 87 year old female who presents for Preventive Visit.    Are you in the first 12 months of your Medicare coverage?  No    Healthy Habits:     In general, how would you rate your overall health?  Good    Frequency of exercise:  None    Duration of exercise:  Other    Do you usually eat at least 4 servings of fruit and vegetables a day, include whole grains    & fiber and avoid regularly eating high fat or \"junk\" foods?  Yes    Taking medications regularly:  Yes    Barriers to taking medications:  None    Medication side effects:  None    Ability to successfully perform activities of daily living:  No assistance needed    Home Safety:  Lack of grab bars in the bathroom    Hearing Impairment:  Difficulty following a conversation in a noisy restaurant or crowded room, feel that people are mumbling or not speaking clearly, difficult to understand a speaker at a public meeting or Gnosticist service, need to ask people to speak up or repeat themselves and difficulty understanding soft or whispered speech    In the past 6 months, have you been bothered by leaking of urine? Yes    In general, how would you rate your overall mental or emotional health?  Good      PHQ-2 Total Score: 0    Additional concerns today:  Yes      Progressive now moderate to sever unsteadiness and generalized fatigue since last visit   No vertigo, vision change or focal weakness    Do you feel safe in your environment? Yes    Do you have a Health Care Directive? No: Advance care planning reviewed with patient; information given to patient to review.      Fall risk  Fallen 2 or more times in the past year?: No  Any fall with injury in the past year?: No    Cognitive Screening   1) Repeat 3 items (Leader, Season, Table)    2) Clock draw: NORMAL  3) 3 item recall: Recalls 3 objects  Results: 3 items recalled: COGNITIVE IMPAIRMENT LESS LIKELY    Mini-CogTM Copyright S Abhi. Licensed by the author for use in " [facility]; reprinted with permission (solauryn@[facility]). All rights reserved.      Do you have sleep apnea, excessive snoring or daytime drowsiness?: yes, daytime drowsiness    Reviewed and updated as needed this visit by clinical staff  Tobacco  Allergies  Meds  Med Hx  Surg Hx  Fam Hx  Soc Hx        Reviewed and updated as needed this visit by Provider  Tobacco  Med Hx  Surg Hx  Fam Hx  Soc Hx       Social History     Tobacco Use     Smoking status: Former Smoker     Packs/day: 1.00     Years: 30.00     Pack years: 30.00     Types: Cigarettes     Smokeless tobacco: Never Used   Substance Use Topics     Alcohol use: No     Alcohol/week: 0.0 oz     If you drink alcohol do you typically have >3 drinks per day or >7 drinks per week? No    Current providers sharing in care for this patient include:   Patient Care Team:  Shravan Bourgeois MD as PCP - General (Internal Medicine)  Shravan Bourgeois MD as Assigned PCP    The following health maintenance items are reviewed in Epic and correct as of today:  Health Maintenance   Topic Date Due     ADVANCE CARE PLANNING  10/09/1931     ZOSTER IMMUNIZATION (2 of 3) 09/05/2011     FALL RISK ASSESSMENT  08/20/2019     MEDICARE ANNUAL WELLNESS VISIT  08/20/2019     INFLUENZA VACCINE (1) 09/01/2019     DTAP/TDAP/TD IMMUNIZATION (2 - Td) 07/11/2021     PHQ-2  Completed     PNEUMOCOCCAL IMMUNIZATION 65+ LOW/MEDIUM RISK  Completed     IPV IMMUNIZATION  Aged Out     MENINGITIS IMMUNIZATION  Aged Out     Patient Active Problem List   Diagnosis     Hypothyroidism, unspecified type     Osteopenia     Acoustic neuroma (H)     Aortic valve sclerosis     Heart murmur     Past Surgical History:   Procedure Laterality Date     Stereotactic radiation to acustic neuroma on left with Dr. Galindo Left 05/10/200       Social History     Tobacco Use     Smoking status: Former Smoker     Packs/day: 1.00     Years: 30.00     Pack years: 30.00     Types: Cigarettes     Smokeless  "tobacco: Never Used   Substance Use Topics     Alcohol use: No     Alcohol/week: 0.0 oz     Family History   Problem Relation Age of Onset     Family History Negative Mother      Myocardial Infarction Father      Multiple Sclerosis Daughter      Osteoporosis Daughter          Current Outpatient Medications   Medication Sig Dispense Refill     ASPIRIN 81 MG PO TABS 1 TABLET DAILY       calcium-vitamin D (CALTRATE) 600-400 MG-UNIT per tablet Take 1 tablet by mouth 2 times daily       levothyroxine (SYNTHROID/LEVOTHROID) 75 MCG tablet Take 1 tablet (75 mcg) by mouth daily 90 tablet 3     Multiple Vitamins-Minerals (CENTRUM SILVER 50+WOMEN PO)        Allergies   Allergen Reactions     No Known Allergies          Review of Systems  Constitutional, HEENT, cardiovascular, pulmonary, gi and gu systems are negative, except as otherwise noted.    OBJECTIVE:   /61 (BP Location: Right arm, Patient Position: Sitting, Cuff Size: Adult Regular)   Pulse 72   Temp 97.9  F (36.6  C) (Oral)   Ht 1.58 m (5' 2.2\")   Wt 62.1 kg (137 lb)   SpO2 96%   BMI 24.90 kg/m   Estimated body mass index is 24.9 kg/m  as calculated from the following:    Height as of this encounter: 1.58 m (5' 2.2\").    Weight as of this encounter: 62.1 kg (137 lb).  Physical Exam  GENERAL APPEARANCE: healthy, alert and no distress  EYES: Eyes grossly normal to inspection, PERRL and conjunctivae and sclerae normal  HENT: ear canals and TM's normal, nose and mouth without ulcers or lesions, oropharynx clear and oral mucous membranes moist  NECK: no adenopathy, no asymmetry, masses, or scars and thyroid normal to palpation  RESP: lungs clear to auscultation - no rales, rhonchi or wheezes  CV:  Heart with regular rate and rhythm. Unchanged systolic heart murmur   ABDOMEN: soft, nontender, no hepatosplenomegaly, no masses and bowel sounds normal  MS: no musculoskeletal defects are noted and gait is age appropriate without ataxia  SKIN: no suspicious lesions " "or rashes  NEURO: Alert and oriented to person, place and time. No hearing in left ear.  Cranial nerves 2-12 appear otherwise grossly intact.  Romberg test negative.  She fails the 'get up and go' test.  Gait is moderately ataxic.  Strength is symmetric.  Normal tone.    PSYCH: mentation appears normal and affect normal/bright    Diagnostic Test Results:  Labs pending     ASSESSMENT / PLAN:   1. Routine general medical examination at a health care facility    - Comprehensive metabolic panel  - CBC with platelets  - Lipid panel reflex to direct LDL Fasting    2. Hypothyroidism, unspecified type    - levothyroxine (SYNTHROID/LEVOTHROID) 75 MCG tablet; Take 1 tablet (75 mcg) by mouth daily  Dispense: 90 tablet; Refill: 3  - TSH with free T4 reflex    3. Acoustic neuroma (H)  Given progressive symptoms and the fact that she did not have the MRI that was scheduled for last year, schedule MRI today   - MR Brain w/o & w Contrast; Future  - OFFICE/OUTPT VISIT,EST,LEVL III    4. Aortic valve sclerosis  Stable murmur on exam       End of Life Planning:  Patient currently has an advanced directive: No.  I have verified the patient's ablity to prepare an advanced directive/make health care decisions.  Literature was provided to assist patient in preparing an advanced directive.    COUNSELING:  Reviewed preventive health counseling, as reflected in patient instructions  Special attention given to:       Regular exercise       Healthy diet/nutrition       Immunizations    Reminded patient to get Shingrix           Estimated body mass index is 24.9 kg/m  as calculated from the following:    Height as of this encounter: 1.58 m (5' 2.2\").    Weight as of this encounter: 62.1 kg (137 lb).         reports that she has quit smoking. Her smoking use included cigarettes. She has a 30.00 pack-year smoking history. She has never used smokeless tobacco.      Appropriate preventive services were discussed with this patient, including " applicable screening as appropriate for cardiovascular disease, diabetes, osteopenia/osteoporosis, and glaucoma.  As appropriate for age/gender, discussed screening for colorectal cancer, prostate cancer, breast cancer, and cervical cancer. Checklist reviewing preventive services available has been given to the patient.    Reviewed patients plan of care and provided an AVS. The Basic Care Plan (routine screening as documented in Health Maintenance) for Teresa meets the Care Plan requirement. This Care Plan has been established and reviewed with the Patient.    Counseling Resources:  ATP IV Guidelines  Pooled Cohorts Equation Calculator  Breast Cancer Risk Calculator  FRAX Risk Assessment  ICSI Preventive Guidelines  Dietary Guidelines for Americans, 2010  USDA's MyPlate  ASA Prophylaxis  Lung CA Screening    Shravan Bourgeois MD  [facility]    Identified Health Risks:

## 2019-08-23 ENCOUNTER — TELEPHONE (OUTPATIENT)
Dept: FAMILY MEDICINE | Facility: CLINIC | Age: 84
End: 2019-08-23

## 2019-08-23 NOTE — TELEPHONE ENCOUNTER
Upon chart review, writer noted Echo results from 2017:     Notes Recorded by Shravan Bourgeois MD on 8/28/2017 at 10:29 PM  The following letter pertains to your most recent diagnostic tests:    Good news! Your echocardiogram shows no dangerous findings.  Your heart murmur is explained by a benign finding known as aortic sclerosis which does not require further evaluation or follow up.      I explained this note to patient, and that she will always see the diagnosis of Aortic Sclerosis on her list because it is a diagnosis that she has been give, but it is not dangerous or does not require any further follow-up or evaluation (per above). Discussed that Dr. Bourgeois noted a STABLE murmur heard at her exam yesterday, which means that nothing has changed or worsened, so she should not be concerned.     Pt was very appreciative.     Lili LORENZO RN

## 2019-08-23 NOTE — TELEPHONE ENCOUNTER
Reason for Call:  Other     Detailed comments: Pt would like to discuss her appointment on 8/22/19 her after visit bubba gaines an aortic valve sclerosis was discussed.  She does not know what this is, and does not recall it being discussed.  Please call to discuss  Phone Number Patient can be reached at: Home number on file [phone] (home)    Best Time: any    Can we leave a detailed message on this number? YES    Call taken on 8/23/2019 at 1:00 PM by Sarah Doll

## 2019-08-24 NOTE — RESULT ENCOUNTER NOTE
The following letter pertains to your most recent diagnostic tests:    -Your cholesterol panel looks healthy.     -TSH (thyroid stimulating hormone) level is normal which indicates normal circulating thyroid hormone levels.      -Liver and gallbladder tests are normal for you. (ALT,AST, Alk phos, bilirubin), kidney function is normal for you (Creatinine, GFR), Sodium is normal, Potassium is normal for you, Calcium is normal for you, Glucose (blood sugar) is normal for you.      -Your complete blood counts including your hemoglobin returned normal for you.         Bottom line:  Lab results look healthy. Schedule your MRI test!   Please call [facility] at [phone] to schedule your MRI.         Follow up:  I will contact you with your MRI results when they are available.         Sincerely,    Dr. Bourgeois

## 2019-08-28 ENCOUNTER — HOSPITAL ENCOUNTER (OUTPATIENT)
Dept: MRI IMAGING | Facility: CLINIC | Age: 84
Discharge: HOME | End: 2019-08-28
Attending: INTERNAL MEDICINE | Admitting: INTERNAL MEDICINE
Payer: MEDICARE

## 2019-08-28 DIAGNOSIS — D33.3 ACOUSTIC NEUROMA (H): ICD-10-CM

## 2019-08-28 PROCEDURE — A9585 GADOBUTROL INJECTION: HCPCS | Performed by: INTERNAL MEDICINE

## 2019-08-28 PROCEDURE — 25500064 ZZH RX 255 OP 636: Performed by: INTERNAL MEDICINE

## 2019-08-28 PROCEDURE — 70553 MRI BRAIN STEM W/O & W/DYE: CPT

## 2019-08-28 RX ORDER — GADOBUTROL 604.72 MG/ML
6 INJECTION INTRAVENOUS ONCE
Status: DISCONTINUED | OUTPATIENT
Start: 2019-08-28 | End: 2019-08-29 | Stop reason: HOSPADM

## 2019-08-28 RX ORDER — GADOBUTROL 604.72 MG/ML
6 INJECTION INTRAVENOUS ONCE
Status: COMPLETED | OUTPATIENT
Start: 2019-08-28 | End: 2019-08-28

## 2019-08-28 RX ADMIN — GADOBUTROL 6 ML: 604.72 INJECTION INTRAVENOUS at 09:46

## 2019-08-28 NOTE — LETTER
[address] [facility] [address] [facility]  [address]  [place]  [address]  Tel: [phone]    September 3, 2019    Teresa Bates  [address]   [place] [address]        Dear Ms. Bates,    The following letter pertains to your most recent diagnostic tests:    Good news! Your brain MRI shows that your brain mass has actually decreased in size sine last visit.  There are no new findings to explain your imbalance.           Sincerely,    Dr. Bourgeois/SHAHZAD

## 2019-09-01 NOTE — RESULT ENCOUNTER NOTE
The following letter pertains to your most recent diagnostic tests:    Good news! Your brain MRI shows that your brain mass has actually decreased in size sine last visit.  There are no new findings to explain your imbalance.           Sincerely,    Dr. Bourgeois

## 2019-09-03 ENCOUNTER — TELEPHONE (OUTPATIENT)
Dept: FAMILY MEDICINE | Facility: CLINIC | Age: 84
End: 2019-09-03

## 2019-09-03 DIAGNOSIS — R35.0 URINARY FREQUENCY: Primary | ICD-10-CM

## 2019-09-03 NOTE — TELEPHONE ENCOUNTER
Reason for Call:  Other     Detailed comments: patient had a px on 8/22/19 and forgot to ask about getting a handicapped sticker for her car.  She would like to get one. Please advise.     Phone Number Patient can be reached at: Home number on file [phone] (home)    Best Time: any    Can we leave a detailed message on this number? YES    Call taken on 9/3/2019 at 9:25 AM by Bing Selby

## 2019-09-03 NOTE — TELEPHONE ENCOUNTER
TO PCP:     See below, pt reports she forgot to discuss handicapped parking at last OV     Please advise - should she return for a separate OV to discuss?    Arpita ARRIAGA RN

## 2019-11-16 ENCOUNTER — TRANSFERRED RECORDS (OUTPATIENT)
Dept: HEALTH INFORMATION MANAGEMENT | Facility: CLINIC | Age: 84
End: 2019-11-16

## 2020-02-12 ENCOUNTER — TRANSFERRED RECORDS (OUTPATIENT)
Dept: HEALTH INFORMATION MANAGEMENT | Facility: CLINIC | Age: 85
End: 2020-02-12

## 2020-04-16 ENCOUNTER — TELEPHONE (OUTPATIENT)
Dept: FAMILY MEDICINE | Facility: CLINIC | Age: 85
End: 2020-04-16

## 2020-04-16 NOTE — TELEPHONE ENCOUNTER
Reason for Call:  Other records    Detailed comments: Patient had been in [facility] and broken her hip,  She said she requested the records & xrays be sent to Dr Bourgeois, on 2/12 from [facility] in [place]. 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   Have records been received.  An WINTER is in chart, but I don't see records scanned in yet.     Phone Number Patient can be reached at: Home number on file [phone] (home)    Best Time: any    Can we leave a detailed message on this number? YES    Call taken on 4/16/2020 at 11:28 AM by Bing Selby

## 2020-04-16 NOTE — TELEPHONE ENCOUNTER
I don't see these records, can we please obtain the records and then set up a telephone visit to review the results together?

## 2020-04-21 NOTE — TELEPHONE ENCOUNTER
Message left with medical records at [facility] to try and obtain records from patient's stay in February. Signed WINTER is in chart from [facility].    Derick Esposito CMA on 4/21/2020 at 1:27 PM

## 2020-04-21 NOTE — TELEPHONE ENCOUNTER
Dr. Bourgeois,    Have you seen any records on this patient come in?    Derick Esposito, CMA on 4/21/2020 at 9:27 AM

## 2020-04-22 NOTE — TELEPHONE ENCOUNTER
Jennie with [employer] called to have the WINTER emailed to her   Emailed from Fax machine to [email or web address]

## 2020-04-23 ENCOUNTER — TELEPHONE (OUTPATIENT)
Dept: FAMILY MEDICINE | Facility: CLINIC | Age: 85
End: 2020-04-23

## 2020-04-23 NOTE — TELEPHONE ENCOUNTER
Panel Management Review      Patient has the following on her problem list: None      Composite cancer screening  Chart review shows that this patient is due/due soon for the following Influenza Immunization report.  Summary:    Patient is due/failing the following:   Influenza Immunization    Action needed:   Called patient and left VM to call back clinic.    Type of outreach:    Phone, left message for patient to call back.     Questions for provider review:    None                                                                                                                                    Sandy Henley MA on 4/23/2020 at 3:40 PM

## 2020-07-02 ENCOUNTER — VIRTUAL VISIT (OUTPATIENT)
Dept: FAMILY MEDICINE | Facility: CLINIC | Age: 85
End: 2020-07-02
Payer: MEDICARE

## 2020-07-02 DIAGNOSIS — R91.8 PULMONARY NODULES: ICD-10-CM

## 2020-07-02 DIAGNOSIS — S72.009D CLOSED FRACTURE OF HIP WITH ROUTINE HEALING, UNSPECIFIED LATERALITY, SUBSEQUENT ENCOUNTER: Primary | ICD-10-CM

## 2020-07-02 DIAGNOSIS — E03.9 HYPOTHYROIDISM, UNSPECIFIED TYPE: ICD-10-CM

## 2020-07-02 PROCEDURE — 99442 ZZC PHYSICIAN TELEPHONE EVALUATION 11-20 MIN: CPT | Performed by: INTERNAL MEDICINE

## 2020-07-02 RX ORDER — LEVOTHYROXINE SODIUM 75 UG/1
75 TABLET ORAL DAILY
Qty: 90 TABLET | Refills: 3 | Status: SHIPPED | OUTPATIENT
Start: 2020-07-02 | End: 2021-01-01

## 2020-07-02 NOTE — PROGRESS NOTES
"Teresa Bates is a 88 year old female who is being evaluated via a billable telephone visit.      The patient has been notified of following:     \"This telephone visit will be conducted via a call between you and your physician/provider. We have found that certain health care needs can be provided without the need for a physical exam.  This service lets us provide the care you need with a short phone conversation.  If a prescription is necessary we can send it directly to your pharmacy.  If lab work is needed we can place an order for that and you can then stop by our lab to have the test done at a later time.    Telephone visits are billed at different rates depending on your insurance coverage. During this emergency period, for some insurers they may be billed the same as an in-person visit.  Please reach out to your insurance provider with any questions.    If during the course of the call the physician/provider feels a telephone visit is not appropriate, you will not be charged for this service.\"    Patient has given verbal consent for Telephone visit?  Yes    What phone number would you like to be contacted at? [phone]    How would you like to obtain your AVS? Mail a copy    Subjective     Teresa Bates is a 88 year old female who presents via phone visit today for the following health issues:    HPI    Follow up kit Moore is an 88-year-old female with a history of acoustic neuroma who spent the winter in [place] and unfortunately had a fall and broke her femur.  She had an emergency surgery there.  An incidental 0.9 cm lung nodule was noted by her report during that evaluation.  She is using a walker but getting by.  She is frustrated by social distancing although she understands its utility.  She wondered if she needed to have a PET scan to evaluate her nodule?    Patient Active Problem List   Diagnosis     Hypothyroidism, unspecified type     Osteopenia     Acoustic neuroma (H)     " Aortic valve sclerosis     Heart murmur     Past Surgical History:   Procedure Laterality Date     Stereotactic radiation to acustic neuroma on left with Dr. Galindo Left 05/10/200       Social History     Tobacco Use     Smoking status: Former Smoker     Packs/day: 1.00     Years: 30.00     Pack years: 30.00     Types: Cigarettes     Smokeless tobacco: Never Used   Substance Use Topics     Alcohol use: No     Alcohol/week: 0.0 standard drinks     Family History   Problem Relation Age of Onset     Family History Negative Mother      Myocardial Infarction Father      Multiple Sclerosis Daughter      Osteoporosis Daughter          Current Outpatient Medications   Medication Sig Dispense Refill     ASPIRIN 81 MG PO TABS 1 TABLET DAILY       calcium-vitamin D (CALTRATE) 600-400 MG-UNIT per tablet Take 1 tablet by mouth 2 times daily       levothyroxine (SYNTHROID/LEVOTHROID) 75 MCG tablet Take 1 tablet (75 mcg) by mouth daily 90 tablet 3     Multiple Vitamins-Minerals (CENTRUM SILVER 50+WOMEN PO)        Allergies   Allergen Reactions     No Known Allergies        Reviewed and updated as needed this visit by Provider         Review of Systems   Constitutional, HEENT, cardiovascular, pulmonary, gi and gu systems are negative, except as otherwise noted.       Objective   Reported vitals:  There were no vitals taken for this visit.   healthy, alert and no distress  PSYCH: Alert and oriented times 3; coherent speech, normal   rate and volume, able to articulate logical thoughts, able   to abstract reason, no tangential thoughts, no hallucinations   or delusions  Her affect is normal  RESP: No cough, no audible wheezing, able to talk in full sentences  Remainder of exam unable to be completed due to telephone visits            Assessment/Plan:  1. Closed fracture of hip with routine healing, unspecified laterality, subsequent encounter  It seems she is recovering slowly from this unfortunate event    2. Pulmonary  nodules  Options to further evaluate her lung nodule could include a PET scan or a follow-up CT scan at this interval, we discussed the pros and cons of each of these tests.  We also discussed that if the lung nodule is enlarging, the treatment would be an extensive lung surgery.  Is not certain how well she would do with the surgery like that.  At the end of our discussion, we decided to hold off on follow-up imaging until we meet again at the end of August when she has a planned face-to-face appointment with me.  She prefers to delay follow-up treatment due to concern about going in the radiology department during the viral pandemic.  Her questions were answered regarding this finding, she demonstrated good understanding of the pros and cons of follow-up imaging now versus waiting.    3. Hypothyroidism, unspecified type  She needs a refill on her levothyroxine, check thyroid function test when she returns  - levothyroxine (SYNTHROID/LEVOTHROID) 75 MCG tablet; Take 1 tablet (75 mcg) by mouth daily  Dispense: 90 tablet; Refill: 3    Return in about 8 weeks (around 8/26/2020) for Preventive Visit.  Patient instructed to return to clinic or contact us sooner if symptoms worsen or new symptoms develop.       Phone call duration:  12 minutes    Shravan Bourgeois MD

## 2020-08-15 ENCOUNTER — APPOINTMENT (OUTPATIENT)
Dept: CT IMAGING | Facility: CLINIC | Age: 85
End: 2020-08-15
Attending: EMERGENCY MEDICINE
Payer: MEDICARE

## 2020-08-15 ENCOUNTER — HOSPITAL ENCOUNTER (OUTPATIENT)
Facility: CLINIC | Age: 85
Setting detail: OBSERVATION
Discharge: NURSING, REHAB OR LONG-TERM CARE | End: 2020-08-17
Attending: EMERGENCY MEDICINE | Admitting: HOSPITALIST
Payer: MEDICARE

## 2020-08-15 ENCOUNTER — APPOINTMENT (OUTPATIENT)
Dept: ULTRASOUND IMAGING | Facility: CLINIC | Age: 85
End: 2020-08-15
Attending: EMERGENCY MEDICINE
Payer: MEDICARE

## 2020-08-15 DIAGNOSIS — M62.82 NON-TRAUMATIC RHABDOMYOLYSIS: ICD-10-CM

## 2020-08-15 DIAGNOSIS — R06.02 SHORTNESS OF BREATH: ICD-10-CM

## 2020-08-15 DIAGNOSIS — K59.00 CONSTIPATION, UNSPECIFIED CONSTIPATION TYPE: Primary | ICD-10-CM
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
## 2020-08-15 LAB
ALBUMIN UR-MCNC: 30 MG/DL
ANION GAP SERPL CALCULATED.3IONS-SCNC: 4 MMOL/L (ref 3–14)
APPEARANCE UR: CLEAR
BASOPHILS # BLD AUTO: 0 10E9/L (ref 0–0.2)
BASOPHILS NFR BLD AUTO: 0.1 %
BILIRUB UR QL STRIP: NEGATIVE
BUN SERPL-MCNC: 32 MG/DL (ref 7–30)
CALCIUM SERPL-MCNC: 9.2 MG/DL (ref 8.5–10.1)
CHLORIDE SERPL-SCNC: 107 MMOL/L (ref 94–109)
CK SERPL-CCNC: 1035 U/L (ref 30–225)
CO2 SERPL-SCNC: 30 MMOL/L (ref 20–32)
COLOR UR AUTO: YELLOW
CREAT SERPL-MCNC: 0.66 MG/DL (ref 0.52–1.04)
D DIMER PPP FEU-MCNC: 3 UG/ML FEU (ref 0–0.5)
DIFFERENTIAL METHOD BLD: ABNORMAL
EOSINOPHIL # BLD AUTO: 0 10E9/L (ref 0–0.7)
EOSINOPHIL NFR BLD AUTO: 0 %
ERYTHROCYTE [DISTWIDTH] IN BLOOD BY AUTOMATED COUNT: 14.2 % (ref 10–15)
GFR SERPL CREATININE-BSD FRML MDRD: 79 ML/MIN/{1.73_M2}
GLUCOSE SERPL-MCNC: 118 MG/DL (ref 70–99)
GLUCOSE UR STRIP-MCNC: NEGATIVE MG/DL
HCT VFR BLD AUTO: 39.7 % (ref 35–47)
HGB BLD-MCNC: 13.3 G/DL (ref 11.7–15.7)
HGB UR QL STRIP: ABNORMAL
HYALINE CASTS #/AREA URNS LPF: 1 /LPF (ref 0–2)
IMM GRANULOCYTES # BLD: 0 10E9/L (ref 0–0.4)
IMM GRANULOCYTES NFR BLD: 0.2 %
INTERPRETATION ECG - MUSE: NORMAL
KETONES UR STRIP-MCNC: 80 MG/DL
LEUKOCYTE ESTERASE UR QL STRIP: NEGATIVE
LYMPHOCYTES # BLD AUTO: 0.8 10E9/L (ref 0.8–5.3)
LYMPHOCYTES NFR BLD AUTO: 7.6 %
MCH RBC QN AUTO: 33.1 PG (ref 26.5–33)
MCHC RBC AUTO-ENTMCNC: 33.5 G/DL (ref 31.5–36.5)
MCV RBC AUTO: 99 FL (ref 78–100)
MONOCYTES # BLD AUTO: 0.9 10E9/L (ref 0–1.3)
MONOCYTES NFR BLD AUTO: 8 %
MUCOUS THREADS #/AREA URNS LPF: PRESENT /LPF
MYOGLOBIN SERPL-MCNC: 1703 UG/L
NEUTROPHILS # BLD AUTO: 9.1 10E9/L (ref 1.6–8.3)
NEUTROPHILS NFR BLD AUTO: 84.1 %
NITRATE UR QL: NEGATIVE
PH UR STRIP: 5.5 PH (ref 5–7)
PLATELET # BLD AUTO: 252 10E9/L (ref 150–450)
POTASSIUM SERPL-SCNC: 4.1 MMOL/L (ref 3.4–5.3)
RBC # BLD AUTO: 4.02 10E12/L (ref 3.8–5.2)
RBC #/AREA URNS AUTO: 4 /HPF (ref 0–2)
SARS-COV-2 RNA SPEC QL NAA+PROBE: NORMAL
SODIUM SERPL-SCNC: 141 MMOL/L (ref 133–144)
SOURCE: ABNORMAL
SP GR UR STRIP: 1.03 (ref 1–1.03)
SPECIMEN SOURCE: NORMAL
SQUAMOUS #/AREA URNS AUTO: 1 /HPF (ref 0–1)
T4 FREE SERPL-MCNC: 1.15 NG/DL (ref 0.76–1.46)
TROPONIN I SERPL-MCNC: <0.015 UG/L (ref 0–0.04)
TSH SERPL DL<=0.005 MIU/L-ACNC: 9.94 MU/L (ref 0.4–4)
UROBILINOGEN UR STRIP-MCNC: NORMAL MG/DL (ref 0–2)
WBC # BLD AUTO: 10.8 10E9/L (ref 4–11)
WBC #/AREA URNS AUTO: 1 /HPF (ref 0–5)

## 2020-08-15 PROCEDURE — G0378 HOSPITAL OBSERVATION PER HR: HCPCS

## 2020-08-15 PROCEDURE — 85025 COMPLETE CBC W/AUTO DIFF WBC: CPT | Performed by: EMERGENCY MEDICINE

## 2020-08-15 PROCEDURE — 80048 BASIC METABOLIC PNL TOTAL CA: CPT | Performed by: EMERGENCY MEDICINE

## 2020-08-15 PROCEDURE — 25000128 H RX IP 250 OP 636: Performed by: EMERGENCY MEDICINE

## 2020-08-15 PROCEDURE — 25800030 ZZH RX IP 258 OP 636: Performed by: EMERGENCY MEDICINE

## 2020-08-15 PROCEDURE — 96360 HYDRATION IV INFUSION INIT: CPT | Mod: 59

## 2020-08-15 PROCEDURE — 70450 CT HEAD/BRAIN W/O DYE: CPT

## 2020-08-15 PROCEDURE — 93005 ELECTROCARDIOGRAM TRACING: CPT

## 2020-08-15 PROCEDURE — 25800030 ZZH RX IP 258 OP 636: Performed by: HOSPITALIST

## 2020-08-15 PROCEDURE — 99285 EMERGENCY DEPT VISIT HI MDM: CPT | Mod: 25

## 2020-08-15 PROCEDURE — 25000125 ZZHC RX 250: Performed by: EMERGENCY MEDICINE

## 2020-08-15 PROCEDURE — 85379 FIBRIN DEGRADATION QUANT: CPT | Performed by: EMERGENCY MEDICINE

## 2020-08-15 PROCEDURE — 84439 ASSAY OF FREE THYROXINE: CPT | Performed by: EMERGENCY MEDICINE

## 2020-08-15 PROCEDURE — C9803 HOPD COVID-19 SPEC COLLECT: HCPCS

## 2020-08-15 PROCEDURE — 83874 ASSAY OF MYOGLOBIN: CPT | Performed by: EMERGENCY MEDICINE

## 2020-08-15 PROCEDURE — 82550 ASSAY OF CK (CPK): CPT | Performed by: EMERGENCY MEDICINE

## 2020-08-15 PROCEDURE — 93971 EXTREMITY STUDY: CPT | Mod: RT

## 2020-08-15 PROCEDURE — 84443 ASSAY THYROID STIM HORMONE: CPT | Performed by: EMERGENCY MEDICINE

## 2020-08-15 PROCEDURE — 84484 ASSAY OF TROPONIN QUANT: CPT | Performed by: EMERGENCY MEDICINE

## 2020-08-15 PROCEDURE — 71275 CT ANGIOGRAPHY CHEST: CPT

## 2020-08-15 PROCEDURE — 99219 ZZC INITIAL OBSERVATION CARE,LEVL II: CPT | Performed by: HOSPITALIST

## 2020-08-15 PROCEDURE — 81001 URINALYSIS AUTO W/SCOPE: CPT | Performed by: EMERGENCY MEDICINE

## 2020-08-15 RX ORDER — AMOXICILLIN 250 MG
1 CAPSULE ORAL 2 TIMES DAILY PRN
Status: DISCONTINUED | OUTPATIENT
Start: 2020-08-15 | End: 2020-08-17 | Stop reason: HOSPADM

## 2020-08-15 RX ORDER — ACETAMINOPHEN 650 MG/1
650 SUPPOSITORY RECTAL EVERY 4 HOURS PRN
Status: DISCONTINUED | OUTPATIENT
Start: 2020-08-15 | End: 2020-08-17 | Stop reason: HOSPADM

## 2020-08-15 RX ORDER — NALOXONE HYDROCHLORIDE 0.4 MG/ML
.1-.4 INJECTION, SOLUTION INTRAMUSCULAR; INTRAVENOUS; SUBCUTANEOUS
Status: DISCONTINUED | OUTPATIENT
Start: 2020-08-15 | End: 2020-08-17 | Stop reason: HOSPADM

## 2020-08-15 RX ORDER — LEVOTHYROXINE SODIUM 75 UG/1
75 TABLET ORAL DAILY
Status: DISCONTINUED | OUTPATIENT
Start: 2020-08-16 | End: 2020-08-17 | Stop reason: HOSPADM

## 2020-08-15 RX ORDER — ACETAMINOPHEN 325 MG/1
650 TABLET ORAL EVERY 4 HOURS PRN
Status: DISCONTINUED | OUTPATIENT
Start: 2020-08-15 | End: 2020-08-17 | Stop reason: HOSPADM

## 2020-08-15 RX ORDER — SODIUM CHLORIDE 9 MG/ML
INJECTION, SOLUTION INTRAVENOUS ONCE
Status: DISCONTINUED | OUTPATIENT
Start: 2020-08-15 | End: 2020-08-15

## 2020-08-15 RX ORDER — ONDANSETRON 2 MG/ML
4 INJECTION INTRAMUSCULAR; INTRAVENOUS EVERY 6 HOURS PRN
Status: DISCONTINUED | OUTPATIENT
Start: 2020-08-15 | End: 2020-08-17 | Stop reason: HOSPADM

## 2020-08-15 RX ORDER — ONDANSETRON 4 MG/1
4 TABLET, ORALLY DISINTEGRATING ORAL EVERY 6 HOURS PRN
Status: DISCONTINUED | OUTPATIENT
Start: 2020-08-15 | End: 2020-08-17 | Stop reason: HOSPADM

## 2020-08-15 RX ORDER — IBUPROFEN 200 MG
200 TABLET ORAL EVERY MORNING
COMMUNITY
End: 2020-12-14

## 2020-08-15 RX ORDER — AMOXICILLIN 250 MG
2 CAPSULE ORAL 2 TIMES DAILY PRN
Status: DISCONTINUED | OUTPATIENT
Start: 2020-08-15 | End: 2020-08-17 | Stop reason: HOSPADM

## 2020-08-15 RX ORDER — IOPAMIDOL 755 MG/ML
57 INJECTION, SOLUTION INTRAVASCULAR ONCE
Status: COMPLETED | OUTPATIENT
Start: 2020-08-15 | End: 2020-08-15

## 2020-08-15 RX ORDER — SODIUM CHLORIDE 9 MG/ML
INJECTION, SOLUTION INTRAVENOUS CONTINUOUS
Status: DISCONTINUED | OUTPATIENT
Start: 2020-08-15 | End: 2020-08-17 | Stop reason: HOSPADM

## 2020-08-15 RX ADMIN — SODIUM CHLORIDE 500 ML: 9 INJECTION, SOLUTION INTRAVENOUS at 17:01

## 2020-08-15 RX ADMIN — IOPAMIDOL 57 ML: 755 INJECTION, SOLUTION INTRAVENOUS at 16:11

## 2020-08-15 RX ADMIN — SODIUM CHLORIDE 84 ML: 9 INJECTION, SOLUTION INTRAVENOUS at 16:12

## 2020-08-15 RX ADMIN — SODIUM CHLORIDE: 9 INJECTION, SOLUTION INTRAVENOUS at 19:06

## 2020-08-15 ASSESSMENT — ENCOUNTER SYMPTOMS
BACK PAIN: 0
CONFUSION: 1
ABDOMINAL PAIN: 0
SHORTNESS OF BREATH: 1
COUGH: 0
ARTHRALGIAS: 0
DYSURIA: 0
DIZZINESS: 1
NAUSEA: 0
FEVER: 0
HEMATURIA: 0
NECK PAIN: 0
VOMITING: 0

## 2020-08-15 ASSESSMENT — MIFFLIN-ST. JEOR
SCORE: 972.93
SCORE: 882.21

## 2020-08-15 NOTE — ED NOTES
"[facility]  ED Nurse Handoff Report    ED Chief complaint: Altered Mental Status and Fall      ED Diagnosis:   Final diagnoses:   Non-traumatic rhabdomyolysis   Fall, initial encounter   Shortness of breath       Code Status: Full Code and to be addressed with admitting MD    Allergies:   Allergies   Allergen Reactions     No Known Allergies        Patient Story: From home, lives independently. Fell yesterday 8/14 in evening. Was found down today by EMS. Family thinks she seems more confused. Ambulates independently and with walker.   Focused Assessment:    Cardiac WDL  Resp WDL  Neuro WDL except very quiet speech at times    Treatments and/or interventions provided: See MAR  Patient's response to treatments and/or interventions: Tolerated well    To be done/followed up on inpatient unit:  Monitor    Does this patient have any cognitive concerns?: None    Activity level - Baseline/Home:  Stand with Assist and Walker  Activity Level - Current:   Stand with Assist, Stand with assist x2 and Walker    Patient's Preferred language: English   Needed?: No    Isolation: Other: COVID   Infection: COVID pending  Bariatric?: No    Vital Signs:   Vitals:    08/15/20 1448 08/15/20 1500 08/15/20 1545 08/15/20 1615   BP:  136/71 124/61    Pulse:  110 81 100   Resp:       Temp:       TempSrc:       SpO2:    97%   Weight: 59 kg (130 lb)      Height: 1.575 m (5' 2\")          Cardiac Rhythm:Cardiac Rhythm: Sinus tachycardia    Was the PSS-3 completed:   Yes  What interventions are required if any?               Family Comments: Family at bedside    OBS brochure/video discussed/provided to patient/family: Yes              Name of person given brochure if not patient:                Relationship to patient:      For the majority of the shift this patient's behavior was Green.   Behavioral interventions performed were  .    ED NURSE PHONE NUMBER: *[phone]         "

## 2020-08-15 NOTE — H&P
"      [facility]    History and Physical - Hospitalist Service       Date of Admission:  8/15/2020    Assessment & Plan   Teresa Bates is a 88 year old female registered to observation on 8/15/2020 with rhabdomyolysis after possible unwitnessed fall at home last night.    Rhabdomyolysis secondary to immobility/fall  History of acoustic neuroma and dizziness  Patient lives alone independently and apparently got dizzy after leaving the bathroom last evening and ended up on the floor.  She denies hitting her head but does feel like she might have lost consciousness?  CT head unremarkable.  Scalp exam and palpation is nontender.  No focal neuro deficits on admission.  She reports feeling dizzy as she has in the past just before being on the ground.  She denies any palpitations or chest pain prior to being on the ground.  Denies any recent fevers, chills, coughing or shortness of breath.  - CK over 1000, myoglobin 1700.  - Status post 1 L IV fluids in the ED  - We will continue maintenance fluids overnight  - recheck CK tomorrow  - Renal function appears normal on admission, will follow  - PT consultation, may need to reevaluate patient's safety living independently  - telemetry for tonight given unclear fall  - consider echocardiogram update though does not sound to be likely etiology    Incidentally found right medial lobe anterior 5 mm pulmonary nodule  Reports that she used to smoke \"not that much.\"  Could consider repeat scanning in 1 year.  - PCP to evaluate and consider    Hypothyroidism  TSH 3.16 almost a year ago in August 2019.  - We will recheck TSH  - PT levothyroxine to continue for now    Aortic valve sclerosis  Noted. Last echo 2017. VSS in ED.   - outpatient follow up / monitoring could be considered    COVID-19 screening, symptomatic  Patients not the best historian, but apparently did endorse some recent shortness of breath at times.  She denies any fevers, chills or coughing.  " "No sick contacts and no recent travel-son-in-law reports that she has been basically self quarantining her apartment and no one else is visiting her.  - Given questionable shortness of breath history, patient has been swabbed as symptomatic screening  - Special precautions per protocol, if negative would discontinue precautions and not recommend retesting    Diet: reg  DVT Prophylaxis: Pneumatic Compression Devices  Saravia Catheter: not present  Code Status: DNR / DNI - confirmed with patient on admission  Rule Out COVID-19 Handoff:  Teresa is a LOW SUSPICION PUI.  Follow these instructions:    If COVID test positive -> continue isolation precautions    If COVID test negative -> discontinue COVID-specific isolation precautions       Disposition Plan   Expected discharge: possibly home tomorrow afternoon  Entered: Rohit Cuevas MD 08/15/2020, 6:39 PM     The patient's care was discussed with the Patient, Patient's Family and ED MD.    Rohit Cuevas MD  [facility]    ______________________________________________________________________    Chief Complaint   \"I fell and I couldn't get up\"    History is obtained from the patient and her son in law    History of Present Illness   Teresa Bates is a very pleasant 88 year old female with a history of hypothyroidism, aortic valve sclerosis, and acoustic neuroma with dizziness who presented to the ED after likely falling at home and spending the night on the floor.  Patient reports feeling dizzy and ended up on the floor after going the bathroom last night.  She was unable to get up.  She denies any head trauma or pain.  No recent illness or other symptoms previously.  She denies any palpitations, chest pain or pressure prior to ending up on the floor.  Patient son-in-law, Kendal, went over to her house today in the afternoon and they found her on the ground.  Timing was not exactly clear of how long she been on the floor.  Previously there is " concern of some confusion when he arrived there but this is apparently subsided and she is at her baseline.  She denies any recent sick contacts and has not traveled.  She denies any fever, cough, shortness of breath abdominal pain, nausea, vomiting, diarrhea, or dysuria.  At one point in the ED she did endorse some shortness of breath in the past few days and therefore symptomatic COVID testing is pending.    In the ED she was seen by Dr. Joya with whom I discussed the case.  Patient is afebrile, hemodynamically stable, saturating normally on room air.  Patient denies any pain or shortness of breath at this time.  Lab work shows an unremarkable BMP as well as CBC-borderline leukocytosis of 10.8.  Troponin I is negative.  CK 1035, myoglobin 1700.  CT head was negative for acute pathology.  CT chest showed no infiltrates or PE, 5 mm anterior right middle lobe nodule noted.  Ultrasound of the right lower extremity due to some asymmetry and calf size was completed and negative for DVT.  Patient will be registered to observation, ruled out for COVID, with IV fluids infusing and lab recheck in the morning.    Review of Systems    The 10 point Review of Systems is negative other than noted in the HPI or here.     Past Medical History    I have reviewed this patient's medical history and updated it with pertinent information if needed.   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  Past Surgical History:   Procedure Laterality Date     Stereotactic radiation to acustic neuroma on left with Dr. Galindo Left 05/10/200       Social History   I have reviewed this patient's social history and updated it with pertinent information if needed.  Social History     Tobacco Use     Smoking status:  Former Smoker     Packs/day: 1.00     Years: 30.00     Pack years: 30.00     Types: Cigarettes     Smokeless tobacco: Never Used   Substance Use Topics     Alcohol use: No     Alcohol/week: 0.0 standard drinks     Drug use: No       Family History   I have reviewed this patient's family history and updated it with pertinent information if needed.  Family History   Problem Relation Age of Onset     Family History Negative Mother      Myocardial Infarction Father      Multiple Sclerosis Daughter      Osteoporosis Daughter        Prior to Admission Medications   Prior to Admission Medications   Prescriptions Last Dose Informant Patient Reported? Taking?   ASPIRIN 81 MG PO TABS   Yes No   Si TABLET DAILY   Multiple Vitamins-Minerals (CENTRUM SILVER 50+WOMEN PO)   Yes No   calcium-vitamin D (CALTRATE) 600-400 MG-UNIT per tablet   Yes No   Sig: Take 1 tablet by mouth 2 times daily   levothyroxine (SYNTHROID/LEVOTHROID) 75 MCG tablet   No Yes   Sig: Take 1 tablet (75 mcg) by mouth daily      Facility-Administered Medications: None     Allergies   Allergies   Allergen Reactions     No Known Allergies        Physical Exam   Vital Signs: Temp: 97.9  F (36.6  C) Temp src: Oral BP: 136/66 Pulse: 76 Heart Rate: 103 Resp: 16 SpO2: 96 %      Weight: 130 lbs 0 oz    Gen: NAD, pleasant, Douglas  HEENT: Normocephalic, EOMI, MMM  Resp: no crackles, no wheezes, no increased work of resp  CV: S1S2 heard, reg rhythm, reg rate  Abdo: soft, nontender, nondistended, bowel sounds present  Ext: calves nontender, well perfused, no pitting edema, R calf greater in size than L   Neuro: AAOx3, CN grossly intact, no facial asymmetry      Data   Data reviewed today: I reviewed all medications, new labs and imaging results over the last 24 hours. I personally reviewed no images or EKG's today.    Recent Labs   Lab 08/15/20  1437   WBC 10.8   HGB 13.3   MCV 99         POTASSIUM 4.1   CHLORIDE 107   CO2 30   BUN 32*   CR 0.66   ANIONGAP 4    AMAN 9.2   *   TROPI <0.015     Recent Results (from the past 24 hour(s))   Head CT w/o contrast    Narrative    CT SCAN OF THE HEAD WITHOUT CONTRAST   8/15/2020 4:25 PM     HISTORY: Head trauma, minor, GCS>=13, low clinical risk, initial exam.    TECHNIQUE:  Axial images of the head and coronal reformations without  IV contrast material. Radiation dose for this scan was reduced using  automated exposure control, adjustment of the mA and/or kV according  to patient size, or iterative reconstruction technique.    COMPARISON: Brain MRI 8/28/2019    FINDINGS:   Intracranial contents: Previously identified mass in the left IAC with  expansion of the left IAC and porus acusticus better appreciated on  MRI most compatible with acoustic neuroma/vestibular schwannoma.  Otherwise no intracranial mass or mass effect. No specific evidence  for acute infarction. Moderate chronic ischemic changes deep white  matter both cerebral hemispheres. Mild generalized cerebral and  cerebellar parenchymal volume loss is age appropriate. Satisfactory  position cerebellar tonsils. Sella appears within normal limits.  Vascular calcification.    Visualized orbits/sinuses/mastoids:  Postprocedure changes both globes  with no acute orbital process. Mild membrane thickening in the ethmoid  air cells. Minimal fluid inferior mastoid air cells.    Osseous structures/soft tissues:  No fracture of the calvarium or  skull base is identified. No significant swelling of the facial/scalp  soft tissues is noted.       Impression    IMPRESSION:   1. No acute intracranial process.  2. Nothing for acute infarction.  3. 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  4. Additional details above.     SRINIVAS ZELAYA MD   CT Chest Pulmonary Embolism w Contrast    Narrative    CT CHEST PULMONARY EMBOLISM WITH CONTRAST   8/15/2020 4:26 PM    CLINICAL HISTORY:  Shortness of breath, elevated D-dimer, right leg  swelling.    TECHNIQUE: CT angiogram chest during arterial phase injection IV  contrast. 2D and 3D MIP reconstructions were performed by the CT  technologist. Dose reduction techniques were used.     CONTRAST: 57 mL Isovue-370    COMPARISON: None.    FINDINGS:  ANGIOGRAM CHEST: Pulmonary arteries are normal caliber and negative  for pulmonary emboli. Thoracic aorta is negative for dissection. No CT  evidence of right heart strain.    LUNGS AND PLEURA: 5 mm nodule in the anterior right middle lobe.  Benign granuloma in the anterior left upper lobe. Emphysema and  scattered areas of fibrosis noted.    MEDIASTINUM/AXILLAE: Some right-sided cardiac enlargement noted. No  discrete adenopathy.    UPPER ABDOMEN: No acute findings.    MUSCULOSKELETAL: No frankly destructive bony lesions.      Impression    IMPRESSION:  1.  No pulmonary embolism demonstrated.  2.  5 mm nodule in the anterior right middle lobe.    Recommendations for one or multiple incidental lung nodules < 6mm:    Low risk patients: No routine follow-up.    High risk patients: Optional follow-up CT at 12 months; if  unchanged, no further follow-up.    *Low Risk: Minimal or absent history of smoking or other known risk  factors.  *Nonsolid (ground glass) or partly solid nodules may require longer  follow-up to exclude indolent adenocarcinoma.  *Recommendations based on Guidelines for the Management of Incidental  Pulmonary Nodules Detected at CT: From the Fleischner Society 2017,  Radiology 2017.    GAMALIEL WEBB MD   US Lower Extremity Venous Duplex Right    Narrative    ULTRASOUND VENOUS RIGHT LOWER EXTREMITY 8/15/2020 5:20 PM     HISTORY:  Leg swollen, elevated D-dimer.    COMPARISON: None.    TECHNIQUE: Ultrasound gray scale, Color Doppler flow, and spectral  Doppler waveform analysis performed.    FINDINGS:  The right common femoral, superficial femoral,  popliteal  and posterior tibial veins are patent and fully compressible and  demonstrate normal venous Doppler flow. The visualized greater  saphenous vein is negative for thrombus. For comparison the left  common femoral vein was evaluated and was unremarkable.      Impression    IMPRESSION: No DVT demonstrated.

## 2020-08-15 NOTE — PLAN OF CARE
RECEIVING UNIT ED HANDOFF REVIEW    ED Nurse Handoff Report was reviewed by: Nga Milan RN on August 15, 2020 at 6:06 PM

## 2020-08-15 NOTE — ED NOTES
Bed: ED10  Expected date:   Expected time:   Means of arrival:   Comments:  Miguelina  1 fall confused 88 f

## 2020-08-15 NOTE — ED NOTES
DATE:  8/15/2020   TIME OF RECEIPT FROM LAB:  1640  LAB TEST:  CK Total  LAB VALUE:  1035  RESULTS GIVEN WITH READ-BACK TO (PROVIDER):  Eboni Joya MD  TIME LAB VALUE REPORTED TO PROVIDER:   4624

## 2020-08-15 NOTE — ED TRIAGE NOTES
Pt lives independently.  Family haven't heard from her since yesterday.  Patient was found lying on bathroom floor, not sure how she got there.  Laying on R side.  Denies injuries.  Son in law states seemed more confused.  ,.

## 2020-08-15 NOTE — ED PROVIDER NOTES
History   Chief Complaint:  Altered Mental Status and Fall    HPI   Teresa Bates is a 88 year old female, with a history of acoustic neuroma, hypothyroidism, and aortic valve sclerosis, who presents from her independent living facility to the ED for evaluation of altered mental status and fall. The son-in-law reports he went over the the patient's house this afternoon as nobody had heard from her for the past day and when he arrived he found her on the ground. He is unaware of how long she has been on the flood. However, the patient states she has been on the floor since last night. She does mention she has balance issues due to her acoustic neuroma and has been feeling slightly dizzy. The son-in-law does convey the patient is slightly more confused than her baseline. Here, the patient does say she feels short of breath when she talks (for the past two days), but does not feel short of breath when sitting. She denies any back pain, neck pain, headache, or head injury. She notes she did catch herself with her right wrist, but does not have any major pain or abnormalities. The patient denies any fever, cough, chest pain, abdominal pain, nausea, vomiting, dysuria, hematuria, or any other acute symptoms.     Allergies:  No known drug allergies    Medications:    Synthroid  Aspirin    Past Medical History:    Acoustic neuroma  Osteopenia  Heart murmur  Aortic valve sclerosis  Hypothyroidism    Past Surgical History:    Stereotactic radiation to acoustic neuroma on left    Family History:    MI    Social History:  Smoking status: Former  Alcohol use: No  Drug use: No  Presents with son-in-law  PCP: Shravan Bourgeois  Marital Status:   [2]    Review of Systems   Constitutional: Negative for fever.   Respiratory: Positive for shortness of breath. Negative for cough.    Cardiovascular: Negative for chest pain.   Gastrointestinal: Negative for abdominal pain, nausea and vomiting.   Genitourinary: Negative for  "dysuria and hematuria.   Musculoskeletal: Negative for arthralgias, back pain and neck pain.   Neurological: Positive for dizziness.   Psychiatric/Behavioral: Positive for confusion.   All other systems reviewed and are negative.    Physical Exam     Patient Vitals for the past 24 hrs:   BP Temp Temp src Pulse Heart Rate Resp SpO2 Height Weight   08/15/20 1730 136/66 -- -- 76 -- -- 96 % -- --   08/15/20 1615 -- -- -- 100 -- -- 97 % -- --   08/15/20 1545 124/61 -- -- 81 -- -- -- -- --   08/15/20 1500 136/71 -- -- 110 -- -- -- -- --   08/15/20 1448 -- -- -- -- -- -- -- 1.575 m (5' 2\") 59 kg (130 lb)   08/15/20 1429 (!) 142/55 97.9  F (36.6  C) Oral -- 103 16 99 % -- --       Physical Exam  Nursing note and vitals reviewed.    Constitutional:  Appears comfortable.    HENT:    Nose normal.  No discharge. No evidence of facial or scalp trauma. Soft voice. Hard of hearing.      Oral mucosa is moist.  Eyes:    Conjunctivae reveal some chronic redness of the lower eyelids, worse on the right than the left.  Small discharge in the right eye but sclera is normal.     Pupils are equal.  Lymph:  No enlarged or tender cervical or submandibular lymph nodes.   Cardiovascular:  Irregular rhythm with normal S1 and S2.      Normal heart sounds and peripheral pulses 2+ and equal.       No murmur or bailey.  Pulmonary:  Effort normal and breath sounds clear to auscultation bilaterally.     No respiratory distress.  No stridor.     No wheezes. No rales.     GI:    Soft. No distension and no mass. No tenderness.      No rebound and no guarding.  Musculoskeletal:  Full range of motion of upper extremities without evidence of injury or pain. No neck tenderness. Large right lower extremity with trace edema to +1. Lower left extremity has trace edema. No pain in hips or knees.  No extremity deformity.  Neurological:   Alert and oriented. No focal weakness.     Exhibits good muscle tone. Coordination normal.      GCS eye subscore is 4. GCS " verbal subscore is 5.      GCS motor subscore is 6.   Skin:    Skin is warm and dry. No rash noted. No diaphoresis.      No erythema. No pallor.  No lesions.  Psychiatric:   Behavior is normal. Appropriate mood and affect.     Judgment and thought content normal.     Emergency Department Course   ECG (16:00:30):  Rate 85 bpm. AK interval 158. QRS duration 70. QT/QTc 368/437. P-R-T axes 60 45 73. Normal Sinus rhythm with sinus arrhythmia. Normal ECG. No significant change compared to EKG on 10/22/05. Interpreted at 1605 by Eboni Joya MD.    Imaging:  Radiology findings were communicated with the patient who voiced understanding of the findings.    CT Head without contrast:  1. No acute intracranial process.   2. Nothing for acute infarction.   3. Mass with extensive change of the left IAC/cerebellopontine angle   redemonstrated, better seen on prior MR, most compatible with   left-sided acoustic neuroma/vestibular schwannoma.   4. Additional details above.     Imaging independently reviewed and agree with radiologist interpretation.     CT Chest PE with contrast:  1.  No pulmonary embolism demonstrated.   2.  5 mm nodule in the anterior right middle lobe.     Imaging independently reviewed and agree with radiologist interpretation.    US Lower Extremity Venous Duplex Right:  No DVT demonstrated.     Imaging independently reviewed and agree with radiologist interpretation.     Laboratory:  Laboratory findings were communicated with the patient who voiced understanding of the findings.    CBC: WNL (WBC 10.8, HGB 13.3, )    BMP:  (H), BUN 32 (H), o/w WNL (Creatinine 0.66)    Troponin: <0.015    D-dimer: 3.0 (H)    CK Total: 1,035 (HH)    Myoglobin: 1,703 (H)    UA: Ketone 80, Blood Small, Protein Albumin 30, RBC/HPF 4 (H), Mucous Present, o/w Negative    COVID-19 Virus PCR: Pending    Interventions:  1701:  mL IV Bolus     Emergency Department Course:  Past medical records, nursing notes,  and vitals reviewed.    1507 I performed an exam of the patient as documented above.     EKG obtained in the ED, see results above.   IV was inserted and blood was drawn for laboratory testing, results above.  The patient provided a urine sample here in the emergency department. This was sent for laboratory testing, findings above.  The patient was sent for a head CT while in the emergency department, results above.     1557 I rechecked the patient and discussed the results of her workup thus far.     1725 I rechecked the patient. Explained findings to patient.    Findings and plan explained to the Patient who consents to admission. Discussed the patient with Dr. Cuevas, who will admit the patient to an observation bed for further monitoring, evaluation, and treatment.    I personally reviewed the laboratory and imaging results with the Patient and answered all related questions prior to admission.     Impression & Plan   Covid-19  Teresa Bates was evaluated during a global COVID-19 pandemic, which necessitated consideration that the patient might be at risk for infection with the SARS-CoV-2 virus that causes COVID-19.   Applicable protocols for evaluation were followed during the patient's care.   COVID-19 was considered as part of the patient's evaluation. The plan for testing is:  a test was obtained during this visit.    Medical Decision Making:  Patient comes in after being on the floor all night.  No apparent injury, CT the head did not show any acute injury.  She is oriented and not confused here but her son in law states that she was a little confused earlier when he found her on the floor.  She denies pain anywhere.  She does say that she has been a little short of breath the last couple of days, especially when talking.  She has not had any COVID exposure, pretty much is just stayed in her apartment this whole time and people bring things to her.  She is not coughing, not running fevers.  An IV  was started and labs obtained.  Her CK and myoglobin are both elevated at 1035 and 1703 respectively.  Troponin is normal, renal function normal although her BUN is up at 32 consistent with some dehydration.  She was given a liter of normal saline.  Her CBC is normal, urine is unremarkable.  I have started normal saline infusion at 125 an hour.  She does have a right lower leg gets bigger than the left leg and with this complaint of shortness of breath I got a d-dimer and it is elevated.  CT of the chest for PE is unremarkable, no blood clot.  Ultrasound of the leg reveals no DVT.  It is possible the d-dimer may be elevated because of her rhabdomyolysis and being on the floor last night.  She is generally weak so she will be admitted observation initially for IV fluids due to the rhabdomyolysis.  Because she is complained of shortness of breath for a couple of days although her risk for getting COVID is incredibly small, she will be tested as a symptomatic COVID patient and will be admitted to the observation unit to Dr. Cuevas.    Diagnosis:    ICD-10-CM    1. Non-traumatic rhabdomyolysis  M62.82    2. Fall, initial encounter  W19.XXXA    3. Shortness of breath  R06.02        Disposition:  Admitted to an observation bed.    Scribe Disclosure:  I, Gary Acharya, am serving as a scribe at 3:07 PM on 8/15/2020 to document services personally performed by Eboni Joya MD based on my observations and the provider's statements to me.        Eboni Joya MD  08/15/20 [id number]

## 2020-08-16 ENCOUNTER — APPOINTMENT (OUTPATIENT)
Dept: PHYSICAL THERAPY | Facility: CLINIC | Age: 85
End: 2020-08-16
Attending: HOSPITALIST
Payer: MEDICARE

## 2020-08-16 ENCOUNTER — APPOINTMENT (OUTPATIENT)
Dept: CARDIOLOGY | Facility: CLINIC | Age: 85
End: 2020-08-16
Attending: HOSPITALIST
Payer: MEDICARE

## 2020-08-16 LAB
ANION GAP SERPL CALCULATED.3IONS-SCNC: 4 MMOL/L (ref 3–14)
BUN SERPL-MCNC: 22 MG/DL (ref 7–30)
CALCIUM SERPL-MCNC: 7.8 MG/DL (ref 8.5–10.1)
CHLORIDE SERPL-SCNC: 111 MMOL/L (ref 94–109)
CK SERPL-CCNC: 1082 U/L (ref 30–225)
CO2 SERPL-SCNC: 26 MMOL/L (ref 20–32)
CREAT SERPL-MCNC: 0.58 MG/DL (ref 0.52–1.04)
ERYTHROCYTE [DISTWIDTH] IN BLOOD BY AUTOMATED COUNT: 14.6 % (ref 10–15)
GFR SERPL CREATININE-BSD FRML MDRD: 82 ML/MIN/{1.73_M2}
GLUCOSE SERPL-MCNC: 99 MG/DL (ref 70–99)
HCT VFR BLD AUTO: 37.1 % (ref 35–47)
HGB BLD-MCNC: 12.1 G/DL (ref 11.7–15.7)
LABORATORY COMMENT REPORT: NORMAL
MCH RBC QN AUTO: 33 PG (ref 26.5–33)
MCHC RBC AUTO-ENTMCNC: 32.6 G/DL (ref 31.5–36.5)
MCV RBC AUTO: 101 FL (ref 78–100)
PLATELET # BLD AUTO: 228 10E9/L (ref 150–450)
POTASSIUM SERPL-SCNC: 3.3 MMOL/L (ref 3.4–5.3)
POTASSIUM SERPL-SCNC: 4.5 MMOL/L (ref 3.4–5.3)
RBC # BLD AUTO: 3.67 10E12/L (ref 3.8–5.2)
SARS-COV-2 RNA SPEC QL NAA+PROBE: NEGATIVE
SODIUM SERPL-SCNC: 141 MMOL/L (ref 133–144)
SPECIMEN SOURCE: NORMAL
WBC # BLD AUTO: 10.3 10E9/L (ref 4–11)

## 2020-08-16 PROCEDURE — 84132 ASSAY OF SERUM POTASSIUM: CPT | Performed by: HOSPITALIST

## 2020-08-16 PROCEDURE — 97530 THERAPEUTIC ACTIVITIES: CPT | Mod: GP

## 2020-08-16 PROCEDURE — 93306 TTE W/DOPPLER COMPLETE: CPT | Mod: 26 | Performed by: INTERNAL MEDICINE

## 2020-08-16 PROCEDURE — 25800030 ZZH RX IP 258 OP 636: Performed by: HOSPITALIST

## 2020-08-16 PROCEDURE — G0378 HOSPITAL OBSERVATION PER HR: HCPCS

## 2020-08-16 PROCEDURE — 97161 PT EVAL LOW COMPLEX 20 MIN: CPT | Mod: GP

## 2020-08-16 PROCEDURE — 93306 TTE W/DOPPLER COMPLETE: CPT

## 2020-08-16 PROCEDURE — 36415 COLL VENOUS BLD VENIPUNCTURE: CPT | Performed by: HOSPITALIST

## 2020-08-16 PROCEDURE — 97116 GAIT TRAINING THERAPY: CPT | Mod: GP

## 2020-08-16 PROCEDURE — 85027 COMPLETE CBC AUTOMATED: CPT | Performed by: HOSPITALIST

## 2020-08-16 PROCEDURE — 82550 ASSAY OF CK (CPK): CPT | Performed by: HOSPITALIST

## 2020-08-16 PROCEDURE — 25500064 ZZH RX 255 OP 636: Performed by: HOSPITALIST

## 2020-08-16 PROCEDURE — 25000132 ZZH RX MED GY IP 250 OP 250 PS 637: Mod: GY | Performed by: HOSPITALIST

## 2020-08-16 PROCEDURE — 80048 BASIC METABOLIC PNL TOTAL CA: CPT | Performed by: HOSPITALIST

## 2020-08-16 PROCEDURE — 99225 ZZC SUBSEQUENT OBSERVATION CARE,LEVEL II: CPT | Performed by: HOSPITALIST

## 2020-08-16 RX ORDER — POTASSIUM CHLORIDE 1500 MG/1
20-40 TABLET, EXTENDED RELEASE ORAL
Status: DISCONTINUED | OUTPATIENT
Start: 2020-08-16 | End: 2020-08-17 | Stop reason: HOSPADM

## 2020-08-16 RX ORDER — POTASSIUM CHLORIDE 29.8 MG/ML
20 INJECTION INTRAVENOUS
Status: DISCONTINUED | OUTPATIENT
Start: 2020-08-16 | End: 2020-08-17 | Stop reason: HOSPADM

## 2020-08-16 RX ORDER — MULTIVITAMIN,THERAPEUTIC
1 TABLET ORAL
Status: DISCONTINUED | OUTPATIENT
Start: 2020-08-16 | End: 2020-08-17 | Stop reason: HOSPADM

## 2020-08-16 RX ORDER — POTASSIUM CHLORIDE 1.5 G/1.58G
20-40 POWDER, FOR SOLUTION ORAL
Status: DISCONTINUED | OUTPATIENT
Start: 2020-08-16 | End: 2020-08-17 | Stop reason: HOSPADM

## 2020-08-16 RX ORDER — POTASSIUM CHLORIDE 7.45 MG/ML
10 INJECTION INTRAVENOUS
Status: DISCONTINUED | OUTPATIENT
Start: 2020-08-16 | End: 2020-08-17 | Stop reason: HOSPADM

## 2020-08-16 RX ORDER — ASPIRIN 81 MG/1
81 TABLET ORAL DAILY
Status: DISCONTINUED | OUTPATIENT
Start: 2020-08-16 | End: 2020-08-17 | Stop reason: HOSPADM

## 2020-08-16 RX ORDER — POTASSIUM CL/LIDO/0.9 % NACL 10MEQ/0.1L
10 INTRAVENOUS SOLUTION, PIGGYBACK (ML) INTRAVENOUS
Status: DISCONTINUED | OUTPATIENT
Start: 2020-08-16 | End: 2020-08-17 | Stop reason: HOSPADM

## 2020-08-16 RX ADMIN — POTASSIUM CHLORIDE 40 MEQ: 1.5 POWDER, FOR SOLUTION ORAL at 14:56

## 2020-08-16 RX ADMIN — CALCIUM CARBONATE 600 MG (1,500 MG)-VITAMIN D3 400 UNIT TABLET 1 TABLET: at 13:06

## 2020-08-16 RX ADMIN — POTASSIUM CHLORIDE 20 MEQ: 1500 TABLET, EXTENDED RELEASE ORAL at 17:41

## 2020-08-16 RX ADMIN — SODIUM CHLORIDE: 9 INJECTION, SOLUTION INTRAVENOUS at 13:13

## 2020-08-16 RX ADMIN — SODIUM CHLORIDE: 9 INJECTION, SOLUTION INTRAVENOUS at 02:45

## 2020-08-16 RX ADMIN — HUMAN ALBUMIN MICROSPHERES AND PERFLUTREN 9 ML: 10; .22 INJECTION, SOLUTION INTRAVENOUS at 14:55

## 2020-08-16 RX ADMIN — ASPIRIN 81 MG: 81 TABLET, COATED ORAL at 10:29

## 2020-08-16 RX ADMIN — MULTIPLE VITAMINS W/ MINERALS TAB 1 TABLET: TAB at 13:06

## 2020-08-16 ASSESSMENT — MIFFLIN-ST. JEOR: SCORE: 923.03

## 2020-08-16 NOTE — PROGRESS NOTES
Updated daughter-in-law Jossie of pt status and plan of care. She and her  (pt's son) live in [place] and fear pt is unable to live safely at home alone. Nursing staff to update family on PT recommendations.     Writer left message for pt's dtr, Deborah, as she had also called wanting an update. Jossie to communicate w/ Deborah regarding pt plan of care updates from now on.     Jossie: [phone]

## 2020-08-16 NOTE — PROGRESS NOTES
[facility]      OUTPATIENT PHYSICAL THERAPY EVALUATION  PLAN OF TREATMENT FOR OUTPATIENT REHABILITATION  (COMPLETE FOR INITIAL CLAIMS ONLY)  Patient's Last Name, First Name, M.I.  YOB: 1931  Teresa Bates                        Provider's Name  [facility] Medical Record No.  [id number]                               Onset Date:  08/15/20   Start of Care Date:  08/16/20      Type:     _X_PT   ___OT   ___SLP Medical Diagnosis:  Mobility/falls                        PT Diagnosis:  Impaired mobility   Visits from SOC:  1   _________________________________________________________________________________  Plan of Treatment/Functional Goals    Planned Interventions:  ,    balance training, bed mobility training, gait training, motor coordination training, neuromuscular re-education, strengthening, transfer training, risk factor education, home program guidelines, progressive activity/exercise,       Goals: See Physical Therapy Goals on Care Plan in [employer] electronic health record.    Therapy Frequency: Daily  Predicted Duration of Therapy Intervention: 3 days  _________________________________________________________________________________    I CERTIFY THE NEED FOR THESE SERVICES FURNISHED UNDER        THIS PLAN OF TREATMENT AND WHILE UNDER MY CARE     (Physician co-signature of this document indicates review and certification of the therapy plan).                Certification date from: 08/16/20, Certification date to: 08/19/20    Referring Physician: Rohit Cuevas MD (P)             Initial Assessment        See Physical Therapy evaluation dated 08/16/20 in Epic electronic health record.

## 2020-08-16 NOTE — PHARMACY-ADMISSION MEDICATION HISTORY
Pharmacy Medication History  Admission medication history interview status for the 8/15/2020  admission is complete. See EPIC admission navigator for prior to admission medications     Medication history sources: Patient and Surescripts  Medication history source reliability: Good  Adherence assessment: Good      Medication reconciliation completed by provider prior to medication history? No    Time spent in this activity: 15 minutes      Prior to Admission medications    Medication Sig Last Dose Taking? Auth Provider   ASPIRIN 81 MG PO TABS 1 TABLET DAILY 8/14/2020 at am Yes Reported, Patient   calcium-vitamin D (CALTRATE) 600-400 MG-UNIT per tablet Take 1 tablet by mouth daily (before lunch)  8/14/2020 at Unknown time Yes Reported, Patient   ibuprofen (ADVIL/MOTRIN) 200 MG tablet Take 200 mg by mouth every morning 8/14/2020 at am Yes Unknown, Entered By History   levothyroxine (SYNTHROID/LEVOTHROID) 75 MCG tablet Take 1 tablet (75 mcg) by mouth daily 8/14/2020 at am Yes Shravan Bourgeois MD   Multiple Vitamins-Minerals (CENTRUM SILVER 50+WOMEN PO) Take 1 tablet by mouth daily (before lunch)  8/14/2020 at Unknown time Yes Reported, Patient

## 2020-08-16 NOTE — PLAN OF CARE
Pt admitted from the ED this evening. VSS, on room air. Denies pain. Pt repositioned in bed. IV fluids infusing.

## 2020-08-16 NOTE — PLAN OF CARE
COVID-19 NEGATIVE, special precautions removed. A&OX4. VSS on RA. Tele: SR. CK: 1082, MD aware. Creatinine: 0.58. K+: 3.3, replacing, recheck around 2100. Up to BSC Ax2 w/ walker, GB. Daisy reg diet, good appetite. IVF infusing. Echo complete, results pending. PT to see. Continue to monitor.

## 2020-08-16 NOTE — PLAN OF CARE
Observation goals  PRIOR TO DISCHARGE      Comments:   -Diagnostic tests and consults completed and resulted -Not met    -Vital signs normal or at patient baseline -Met    -Tolerating oral intake to maintain hydration-met     -Dyspnea improved and O2 sats greater than 88% on room air or prior home oxygen levels   -returns to baseline functional status -met    -Safe disposition plan has been identified -Not met    Nurse to notify provider when observation goals have been met and patient is ready for discharge.         Pt is A&OX4,slow to respond, VSS on RA.Covid result still pending.Sleepy and lethargic throughout the night,Incontinent of urine, bobbi reg diet,[place], hearing aid in use.PT consult pending.Possible discharge today in the afternoon per MD note.

## 2020-08-16 NOTE — PROGRESS NOTES
"[facility]  Hospitalist Progress Note        Zack Taylor MD   08/16/2020        Interval History:      - no acute issues overnight; denies any active complaints         Assessment and Plan:        Teresa Bates is a 88 year old female with PMH of aortic sclerosis, hypothyroidism, acoustic neuroma who presented with a syncopal episode, unwitnessed fall and noted with mild rhabdomyolysis.    Rhabdomyolysis secondary to immobility/fall  History of acoustic neuroma and chronic gait imbalance/dizziness  H/o Aortic sclerosis  - CK 1035--->; Cr normal at 0.66  - dimer elevated at 3 but CT chest noted with no PE  - continue IV hydration but will decrease to NS at 50 ml/hr, encourage PO  - does have h/o aortic sclerosis, loud systolic murmur; will get ECHO  - she has h/o acoustic neuroma, s/p remote h/o stereotactic radiation by Dr Galindo; she does have some chronic gait and balance/dizziness and her PCP has been following up with MRI (last one 8/2019)  - CT head this admission again noted with mass with extensive change of the left IAC/cerebellopontine angle compatible with left-sided acoustic neuroma/vestibular schwannoma  -discussed with ENT on call, prior imagings noted with stable to decreasing mass  - defer to outpatient PCP f/u, might need NS follow up at some point   - PT eval pending; may need to reevaluate patient's safety living independently  - continue telemetry--NSR     Incidentally found right medial lobe anterior 5 mm pulmonary nodule  - Reports that she used to smoke \"not that much.\"  Could consider repeat scanning in 1 year.  - PCP to evaluate and consider     Hypothyroidism  - PT levothyroxine to continue for now  - TSH elevated at 9.94 but free T4 normal at 1.15; repeat TFTs in 6 weeks     COVID-19 screening, negative 8/15    DVT Prophylaxis: Pneumatic Compression Devices    Code Status: DNR / DNI - confirmed with patient on admission      Disposition Plan: likely in am " "pending ECHO/PT if clinically stable                   Physical Exam:      Blood pressure 115/63, pulse 65, temperature 97.5  F (36.4  C), temperature source Oral, resp. rate 16, height 1.575 m (5' 2\"), weight 54 kg (119 lb), SpO2 97 %, not currently breastfeeding.  Vitals:    08/15/20 1448 08/15/20 1859 08/16/20 0611   Weight: 59 kg (130 lb) 49.9 kg (110 lb) 54 kg (119 lb)     Vital Signs with Ranges  Temp:  [97.4  F (36.3  C)-98  F (36.7  C)] 97.5  F (36.4  C)  Pulse:  [] 65  Heart Rate:  [103] 103  Resp:  [16-18] 16  BP: (115-144)/(45-71) 115/63  SpO2:  [94 %-99 %] 97 %  I/O's Last 24 hours  I/O last 3 completed shifts:  In: 350 [I.V.:350]  Out: -     Constitutional: Alert, awake and oriented X 3; resting comfortably in no apparent distress; slightly hard of hearing   HEENT: Pupils equal and reactive to light and accomodation, neck supple    Oral cavity: Moist mucosa   Cardiovascular: Normal s1 s2, regular rate and rhythm, sys murmur +   Lungs: B/l clear to auscultation, no wheezes or crepitations   Abdomen: Soft, nt, nd, no guarding, rigidity or rebound; BS +   LE : No edema   Musculoskeletal: Power 5/5 in all extremities   Neuro: No focal neurological deficits noted, CN II to XII grossly intact   Psychiatry: normal mood and affect                Medications:          levothyroxine  75 mcg Oral Daily     PRN Meds: acetaminophen, acetaminophen, melatonin, naloxone, ondansetron **OR** ondansetron, senna-docusate **OR** senna-docusate         Data:      All new lab and imaging data was reviewed.   Recent Labs   Lab Test 08/15/20  1437 08/22/19  1029 08/20/18  1024   WBC 10.8 7.9 6.2   HGB 13.3 13.4 12.9   MCV 99 103* 102*    214 190      Recent Labs   Lab Test 08/15/20  1437 08/22/19  1029 08/20/18  1024    141 142   POTASSIUM 4.1 4.1 3.9   CHLORIDE 107 108 106   CO2 30 27 28   BUN 32* 14 17   CR 0.66 0.64 0.66   ANIONGAP 4 6 8   AMAN 9.2 8.6 8.6   * 92 96     Recent Labs   Lab Test " 08/15/20  1437   TROPI <0.015

## 2020-08-16 NOTE — PLAN OF CARE
Observation goals  PRIOR TO DISCHARGE      Comments:   -Diagnostic tests and consults completed and resulted -Not met    -Vital signs normal or at patient baseline -Met    -Tolerating oral intake to maintain hydration-met     -Dyspnea improved and O2 sats greater than 88% on room air or prior home oxygen levels   -returns to baseline functional status -met    -Safe disposition plan has been identified -Not met    Nurse to notify provider when observation goals have been met and patient is ready for discharge.

## 2020-08-16 NOTE — PLAN OF CARE
Discharge Planner PT   Teresa admitted after a fall with mild rhabdo, suspected syncope, chart also indicates pt has an acoustic schwannoma with recurrent dizziness (pt denies dizziness with the fall, states she lost her balance turning to PT), and aortic stenosis. Pulmonary nodule also noted with further workup to be done.  PLOF: Modified [place] two-wheeled walker in a condo alone, 1 level living.  Patient plan for discharge: TBD  Current status: Min assist supine to/from sitting, standby assist sitting balance with bedrails, min assist to scoot to square up and to scoot to ground feet, contact guard assist to stand with legs braced against bed with rail then walker use, 5' forward/backwards walk with rolling walker & min assist scoliosis noted and pt very flexed and weak with poor foot clearance and stride then of a couple inches at most and slow - very weak & unsafe to be doing on her own.  Starting visit, PT noticed blood on pt's gown, found the IV in left forearm fully out, no longer bleeding - RN notified who removed IV and bandaged pt, PT changed pt into a fresh gown. End of PT with pt in bed with alarm armed  Barriers to return to prior living situation: Unsafe: needs Ax1 for very short distance mobility and pt lives alone and needs to walk further to access her unit.  Recommendations for discharge: TCU  Rationale for recommendations: Pt needs to improve her safety, strength, balance & activity tolerance with her mobility before being able to return home safely, very high falls risk.        Entered by: Brittany Dressler 08/16/2020 4:16 PM

## 2020-08-16 NOTE — PROGRESS NOTES
MD Notification    Notified Person: MD    Notified Person Name: Brandon    Notification Date/Time: 1140 8/16/20    Notification Interaction: in person    Purpose of Notification: COVID-19 negative    Orders Received: discontinue special precautions. Ok to transfer.

## 2020-08-16 NOTE — PROGRESS NOTES
MD Notification    Notified Person: MD    Notified Person Name: Zack Taylor MD    Notification Date/Time: 8/16/20@ 1345HRS    Notification Interaction: Pager    Purpose of Notification: Lab results: CK Total 1082    Orders Received:    Comments:

## 2020-08-16 NOTE — PROVIDER NOTIFICATION
Notified Person: MD    Notified Person Name: Brandon    Notification Date/Time: 1055 8/16/20    Notification Interaction: text page / telephone    Purpose of Notification: pt requesting PTA centrum-silver and vitamin D-calcium    Orders Received: MD entered orders for vitamins. (see EMAR)    Comments: Previous to paging MD, I explained to pt non-essential meds are often not ordered as pt is observation status, and they will be quite expensive. Pt insisted she would like to take them.

## 2020-08-16 NOTE — PROGRESS NOTES
08/16/20 1500   Quick Adds   Quick Adds Certification   Type of Visit Initial PT Evaluation   Living Environment   Lives With alone   Living Arrangements condominium   Home Accessibility   (no stairs, hallway to her unit)   Transportation Anticipated   (pt doesn't drive)   Self-Care   Usual Activity Tolerance fair   Current Activity Tolerance poor   Equipment Currently Used at Home walker, rolling   Functional Level Prior   Ambulation 1-->assistive equipment   Transferring 1-->assistive equipment   Toileting 1-->assistive equipment   Bathing 1-->assistive equipment   Communication 0-->understands/communicates without difficulty   Swallowing 0-->swallows foods/liquids without difficulty   Cognition 0 - no cognition issues reported   Fall history within last six months yes   Number of times patient has fallen within last six months 1   Which of the above functional risks had a recent onset or change? toileting;bathing;dressing;ambulation;transferring   Prior Functional Level Comment Hero 2WW   General Information   Onset of Illness/Injury or Date of Surgery - Date 08/15/20   Referring Physician Rohit Cuevas MD    Patient/Family Goals Statement To go home.    Pertinent History of Current Problem (include personal factors and/or comorbidities that impact the POC) Teresa admitted after a fall with mild rhabdo, suspected syncope, chart also indicates pt has an acoustic schwannoma with recurrent dizziness (pt denies dizziness with the fall, states she lost her balance turning to PT), and aortic stenosis. Pulmonary nodule also noted with further workup to be done.   Precautions/Limitations fall precautions   Weight-Bearing Status - LUE full weight-bearing  (all)   Cognitive Status Examination   Level of Consciousness alert   Follows Commands and Answers Questions 100% of the time   Personal Safety and Judgment impaired;at risk behaviors demonstrated   Pain Assessment   Patient Currently in Pain No   Posture     Posture Comments Scoliotic spine, flexed spine.    Strength   Strength Comments Notably deconditioning grossly. Asisst with mobiltiy, notable UE dependence for transfers and gait, poor activity tolerance.    Bed Mobility   Bed Mobility Comments Kamila supine to/from sitting.    Transfer Skills   Transfer Comments CGA sit-stand and stand-sit with vc technique. Kamila pivot RW.    Gait   Gait Comments 5' forward/backwards Kamila RW   Balance   Balance Comments Poor, high falls risk: Kamila for mobility, poor activity tolerance, poor stride length, hesitation with initiation, poor foot clearance B.    Coordination   Coordination Comments WFL   Muscle Tone   Muscle Tone Comments [facility]   General Therapy Interventions   Planned Therapy Interventions balance training;bed mobility training;gait training;motor coordination training;neuromuscular re-education;strengthening;transfer training;risk factor education;home program guidelines;progressive activity/exercise   Clinical Impression   Criteria for Skilled Therapeutic Intervention yes, treatment indicated   PT Diagnosis Impaired mobility   Influenced by the following impairments Impaired strength, balance, activity tolerance   Functional limitations due to impairments Impaired independent mobility & living   Clinical Presentation Stable/Uncomplicated   Clinical Decision Making (Complexity) Low complexity   Therapy Frequency Daily   Predicted Duration of Therapy Intervention (days/wks) 3 days   Anticipated Equipment Needs at Discharge gait belt;hospital bed;walker;wheelchair   Anticipated Discharge Disposition Transitional Care Facility   Risk & Benefits of therapy have been explained Yes   Patient, Family & other staff in agreement with plan of care Yes   Therapy Certification   Start of care date 08/16/20   Certification date from 08/16/20   Certification date to 08/19/20   Medical Diagnosis Mobility/falls   Certification I certify the need for these services furnished under this  "plan of treatment and while under my care.  (Physician co-signature of this document indicates review and certification of the therapy plan).    [facility] TM \"6 Clicks\"   2016, Trustees of [facility], under license to [employer].  All rights reserved.   6 Clicks Short Forms Basic Mobility Inpatient Short Form   [facility] AM-PAC  \"6 Clicks\" V.2 Basic Mobility Inpatient Short Form   1. Turning from your back to your side while in a flat bed without using bedrails? 3 - A Little   2. Moving from lying on your back to sitting on the side of a flat bed without using bedrails? 3 - A Little   3. Moving to and from a bed to a chair (including a wheelchair)? 3 - A Little   4. Standing up from a chair using your arms (e.g., wheelchair, or bedside chair)? 3 - A Little   5. To walk in hospital room? 3 - A Little   6. Climbing 3-5 steps with a railing? 3 - A Little   Basic Mobility Raw Score (Score out of 24.Lower scores equate to lower levels of function) 18   Total Evaluation Time   Total Evaluation Time (Minutes) 5     "

## 2020-08-17 ENCOUNTER — APPOINTMENT (OUTPATIENT)
Dept: PHYSICAL THERAPY | Facility: CLINIC | Age: 85
End: 2020-08-17
Payer: MEDICARE

## 2020-08-17 VITALS
HEIGHT: 62 IN | HEART RATE: 95 BPM | SYSTOLIC BLOOD PRESSURE: 130 MMHG | RESPIRATION RATE: 16 BRPM | DIASTOLIC BLOOD PRESSURE: 67 MMHG | OXYGEN SATURATION: 98 % | BODY MASS INDEX: 21.9 KG/M2 | TEMPERATURE: 98.1 F | WEIGHT: 119 LBS

## 2020-08-17 LAB
ANION GAP SERPL CALCULATED.3IONS-SCNC: 4 MMOL/L (ref 3–14)
BUN SERPL-MCNC: 15 MG/DL (ref 7–30)
CALCIUM SERPL-MCNC: 8.2 MG/DL (ref 8.5–10.1)
CHLORIDE SERPL-SCNC: 109 MMOL/L (ref 94–109)
CK SERPL-CCNC: 703 U/L (ref 30–225)
CO2 SERPL-SCNC: 27 MMOL/L (ref 20–32)
CREAT SERPL-MCNC: 0.56 MG/DL (ref 0.52–1.04)
GFR SERPL CREATININE-BSD FRML MDRD: 83 ML/MIN/{1.73_M2}
GLUCOSE SERPL-MCNC: 156 MG/DL (ref 70–99)
POTASSIUM SERPL-SCNC: 3.5 MMOL/L (ref 3.4–5.3)
SODIUM SERPL-SCNC: 140 MMOL/L (ref 133–144)

## 2020-08-17 PROCEDURE — 80048 BASIC METABOLIC PNL TOTAL CA: CPT | Performed by: HOSPITALIST

## 2020-08-17 PROCEDURE — 25000132 ZZH RX MED GY IP 250 OP 250 PS 637: Mod: GY | Performed by: HOSPITALIST

## 2020-08-17 PROCEDURE — 99217 ZZC OBSERVATION CARE DISCHARGE: CPT | Performed by: HOSPITALIST

## 2020-08-17 PROCEDURE — 25800030 ZZH RX IP 258 OP 636: Performed by: HOSPITALIST

## 2020-08-17 PROCEDURE — G0378 HOSPITAL OBSERVATION PER HR: HCPCS

## 2020-08-17 PROCEDURE — 97530 THERAPEUTIC ACTIVITIES: CPT | Mod: GP

## 2020-08-17 PROCEDURE — 36415 COLL VENOUS BLD VENIPUNCTURE: CPT | Performed by: HOSPITALIST

## 2020-08-17 PROCEDURE — 97116 GAIT TRAINING THERAPY: CPT | Mod: GP

## 2020-08-17 PROCEDURE — 82550 ASSAY OF CK (CPK): CPT | Performed by: HOSPITALIST

## 2020-08-17 RX ORDER — POLYETHYLENE GLYCOL 3350 17 G/17G
17 POWDER, FOR SOLUTION ORAL DAILY PRN
Status: DISCONTINUED | OUTPATIENT
Start: 2020-08-17 | End: 2020-08-17 | Stop reason: HOSPADM

## 2020-08-17 RX ORDER — BISACODYL 10 MG
10 SUPPOSITORY, RECTAL RECTAL DAILY PRN
Status: DISCONTINUED | OUTPATIENT
Start: 2020-08-17 | End: 2020-08-17 | Stop reason: HOSPADM

## 2020-08-17 RX ORDER — POLYETHYLENE GLYCOL 3350 17 G/17G
17 POWDER, FOR SOLUTION ORAL DAILY PRN
DISCHARGE
Start: 2020-08-17 | End: 2020-12-14

## 2020-08-17 RX ADMIN — CALCIUM CARBONATE 600 MG (1,500 MG)-VITAMIN D3 400 UNIT TABLET 1 TABLET: at 11:18

## 2020-08-17 RX ADMIN — MULTIPLE VITAMINS W/ MINERALS TAB 1 TABLET: TAB at 11:18

## 2020-08-17 RX ADMIN — DOCUSATE SODIUM AND SENNOSIDES 2 TABLET: 8.6; 5 TABLET, FILM COATED ORAL at 08:53

## 2020-08-17 RX ADMIN — SODIUM CHLORIDE: 9 INJECTION, SOLUTION INTRAVENOUS at 08:55

## 2020-08-17 RX ADMIN — LEVOTHYROXINE SODIUM 75 MCG: 75 TABLET ORAL at 06:04

## 2020-08-17 RX ADMIN — ASPIRIN 81 MG: 81 TABLET, COATED ORAL at 08:25

## 2020-08-17 NOTE — PLAN OF CARE
Pt transferred from obs @ 0100. Pt A/Ox4. VSS on RA. C/o chronic back pain relieved when bed is >30 degrees. Blanchable redness on bilat coccyx, new mepilex placed. T/R q2h Incontinent at times. NS infusing @ 50/hr. Tele: SD. Discharge pending CK improvement, will need TCU per PT.

## 2020-08-17 NOTE — PLAN OF CARE
"Discharge Planner PT   Teresa admitted after a fall with mild rhabdo, suspected syncope, chart also indicates pt has an acoustic schwannoma with recurrent dizziness (pt denies dizziness with the fall, states she lost her balance turning to PT), and aortic stenosis. Pulmonary nodule also noted with further workup to be done.  PLOF: Modified [place] two-wheeled walker in a condo alone, 1 level living.  Patient plan for discharge: TBD  Current status: Min assist supine to/from sitting, standby assist sitting balance with bedrails - posterior and left tendency when not using both arm, light min assist to scoot to scoot to ground feet, min assist to stand given heavy posterior lean with legs braced against bed with rail then walker use, 12' walk with rolling walker & min assist with PT behind at hips prevent pt from posterior LOB - scoliosis noted and pt very flexed and weak with limited foot clearance and stride then of a couple slow (some better with \"big\" cues and walker assist) - very weak & unsafe to be doing on her own. Finished with pt in bed with alarm armed.  Barriers to return to prior living situation: Unsafe: needs Ax1 for very short distance mobility and pt lives alone and needs to walk further to access her unit.  Recommendations for discharge: TCU  Rationale for recommendations: Pt needs to improve her safety, strength, balance & activity tolerance with her mobility before being able to return home safely, very high falls risk.        Entered by: Brittany Dressler 08/17/2020 2:12 PM      Physical Therapy Discharge Summary    Reason for therapy discharge:    Discharged to transitional care facility.    Progress towards therapy goal(s). See goals on Care Plan in [facility] electronic health record for goal details.  Goals not met.  Barriers to achieving goals:   discharge from facility.    Therapy recommendation(s):    Continued therapy is recommended.  Rationale/Recommendations:  per above. .      "

## 2020-08-17 NOTE — PLAN OF CARE
COVID NEGATIVE. Precautions discontinued. A&Ox4 and Up assist of 2 with walker. VSS on RA ex elevated BP. Tolerating regular diet and Tele: SR. IVF infusing. Denies pain, SOB, numbness, tingling, nausea, and dizziness. PT consult. Continue to monitor.

## 2020-08-17 NOTE — PROGRESS NOTES
Patient seen and examined    - no acute issues overnight, CK trending down  - ECHO completed and noted with mild to mod aortic stenosis  - evaluated by PT and recommended TCU  - had been constipated for 5 days; had a good BM after Laxatives today; will order stools softners for d/c    Discharge to TCU today    Please see discharge summary for details

## 2020-08-17 NOTE — PROVIDER NOTIFICATION
MD Notification    Notified Person: MD    Notified Person Name: Dr. Ferro    Notification Date/Time: 08/17/20 @ 0120    Notification Interaction: phone based page    Purpose of Notification:    Pt has no code status listed. Address tonight?     Orders Received: code status added to chart by Dr. Ferro    Comments:

## 2020-08-17 NOTE — PROGRESS NOTES
Observation goals       -diagnostic tests and consults completed and resulted: Not met   -vital signs normal or at patient baseline: Met  -tolerating oral intake to maintain hydration: Met   -dyspnea improved and O2 sats greater than 88% on room air or prior home oxygen levels: Met   -returns to baseline functional status: Not met   -safe disposition plan has been identified: Not met     Nurse to notify provider when observation goals have been met and patient is ready for discharge.

## 2020-08-17 NOTE — PROGRESS NOTES
SW:   D: Received discharge orders for our patient. Bed available at [facility] today, 8/17/20. Patient to transport at 16:15 via "[employer] wheelchair ride. Skilled nursing facility notified of patients discharge time, MD orders sent via [facility]. PAS faxed to skilled nursing facility and is on front of patient chart. Patient and family are aware and in agreement with this plan.   P: Will continue to follow.     PAS-RR    D: Per DHS regulation, SW completed and submitted PAS-RR to [place] [employer] via the Senior [employer] Line.  PAS-RR confirmation # is : [id number]    I: SW spoke with patient and they are aware a PAS-RR has been submitted.  SW reviewed with patient that they may be contacted for a follow up appointment within 10 days of hospital discharge if their SNF stay is < 30 days.  Contact information for [facility] LinkAge Line was also provided.    A: Patient verbalized understanding.    P: Further questions may be directed to [facility] LinkAge Line at #[phone], option #4 for PAS-RR staff.        Hilary Bryant   [facility]  [phone]-

## 2020-08-17 NOTE — DISCHARGE SUMMARY
[facility]  Discharge Summary        Teresa Bates MRN# [id number]   YOB: 1931 Age: 88 year old     Date of Admission:  8/15/2020  Date of Discharge:  8/17/2020  Admitting Physician:  Rohit Cuevas MD  Discharge Physician: Zack Taylor MD  Discharging Service: Hospitalist     Primary Provider: Shravan Bourgeois  Primary Care Physician Phone Number: [phone]         Discharge Diagnoses/Problem Oriented Hospital Course (Providers):    Teresa Bates was admitted on 8/15/2020 by Rohit Cuevsa MD and I would refer you to their history and physical.  The following problems were addressed during her hospitalization:    Teresa Bates is a 88 year old female with PMH of aortic sclerosis, hypothyroidism, acoustic neuroma who presented with a syncopal episode, unwitnessed fall and noted with mild rhabdomyolysis.    Rhabdomyolysis secondary to immobility/fall, likely Syncope  History of acoustic neuroma and chronic gait imbalance/dizziness  Aortic Stenosis (mild to mod, ECHO 8/16/20)  - CK 1035---> 1082---703 with IV hydration; Cr normal   - dimer elevated at 3 but CT chest noted with no PE and US negative for DVT  - does have h/o aortic sclerosis, ECHO 8/16 noted with EF 60-65 %, mild to mod aortic stenosis  - she has h/o acoustic neuroma, s/p remote h/o stereotactic radiation by Dr Galindo; she does have some chronic gait and balance/dizziness and her PCP has been following up with MRI (last one 8/2019)  - CT head this admission again noted with mass with extensive change of the left IAC/cerebellopontine angle 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  - PCP to evaluate and consider     Hypothyroidism  - PT levothyroxine to continue for now  - TSH elevated at 9.94 but free T4 normal at 1.15; repeat TFTs in 6 weeks     COVID-19 screening, negative 8/15     DVT Prophylaxis: Pneumatic Compression Devices     Code Status: DNR / DNI                  Pending Results:        Unresulted Labs Ordered in the Past 30 Days of this Admission     No orders found from 7/16/2020 to 8/16/2020.               Discharge Instructions and Follow-Up:      Follow-up Appointments     Follow Up and recommended labs and tests      Follow up with detention physician.  The following labs/tests are   recommended: repeat CBC, BMP.                  Discharge Disposition:      Discharged to short-term care facility         Discharge Medications:        Current Discharge Medication List      START taking these medications    Details   polyethylene glycol (MIRALAX) 17 g packet Take 17 g by mouth daily as needed for constipation  Qty:      Associated Diagnoses: Constipation, unspecified constipation type         CONTINUE these medications which have NOT CHANGED    Details   ASPIRIN 81 MG PO TABS 1 TABLET DAILY      calcium-vitamin D (CALTRATE) 600-400 MG-UNIT per tablet Take 1 tablet by mouth daily (before lunch)       ibuprofen (ADVIL/MOTRIN) 200 MG tablet Take 200 mg by mouth every morning      levothyroxine (SYNTHROID/LEVOTHROID) 75 MCG tablet Take 1 tablet (75 mcg) by mouth daily  Qty: 90 tablet, Refills: 3    Comments: Patient wishes to pay out of pocket for this drug  Associated Diagnoses: Hypothyroidism, unspecified type      Multiple Vitamins-Minerals (CENTRUM SILVER 50+WOMEN PO) Take 1 tablet by mouth daily (before lunch)                   Allergies:         Allergies   Allergen Reactions     No Known Allergies             Consultations This Hospital " "Stay:      No consultations were requested during this admission         Condition and Physical Exam on Discharge:      Discharge condition: Stable   Discharge vitals: Blood pressure (!) 154/64, pulse 95, temperature 97.5  F (36.4  C), temperature source Oral, resp. rate 16, height 1.575 m (5' 2\"), weight 54 kg (119 lb), SpO2 95 %, not currently breastfeeding.     Constitutional: Alert, awake and oriented X 3; resting comfortably in no apparent distress; slightly hard of hearing   HEENT: Pupils equal and reactive to light and accomodation, neck supple    Oral cavity: Moist mucosa   Cardiovascular: Normal s1 s2, regular rate and rhythm, sys murmur +   Lungs: B/l clear to auscultation, no wheezes or crepitations   Abdomen: Soft, nt, nd, no guarding, rigidity or rebound; BS +   LE : No edema   Musculoskeletal: Power 5/5 in all extremities   Neuro: No focal neurological deficits noted, CN II to XII grossly intact   Psychiatry: normal mood and affect                 Discharge Orders for Skilled Facility (from Discharge Orders):        After Care Instructions     Activity - Up ad joanie      Advance Diet as Tolerated      Follow this diet upon discharge: Orders Placed This Encounter      Regular Diet Adult         Fall precautions      General info for SNF      Length of Stay Estimate: Short Term Care: Estimated # of Days <30  Condition at Discharge: Improving  Level of care:skilled   Rehabilitation Potential: Good  Admission H&P remains valid and up-to-date: Yes  Recent Chemotherapy: N/A  Use Nursing Home Standing Orders: Yes         Mantoux instructions      Give two-step Mantoux (PPD) Per Facility Policy Yes                    Rehab orders for Skilled Facility (from Discharge Orders):      Referrals     Future Labs/Procedures    Occupational Therapy Adult Consult     Comments:    Evaluate and treat as clinically indicated.    Reason:  deconditioning    Physical Therapy Adult Consult     Comments:    Evaluate and treat as " clinically indicated.    Reason:  deconditioning             Discharge Time:      Greater than 30 minutes.        Image Results From This Hospital Stay (For Non-EPIC Providers):        Results for orders placed or performed during the hospital encounter of 08/15/20   Head CT w/o contrast    Narrative    CT SCAN OF THE HEAD WITHOUT CONTRAST   8/15/2020 4:25 PM     HISTORY: Head trauma, minor, GCS>=13, low clinical risk, initial exam.    TECHNIQUE:  Axial images of the head and coronal reformations without  IV contrast material. Radiation dose for this scan was reduced using  automated exposure control, adjustment of the mA and/or kV according  to patient size, or iterative reconstruction technique.    COMPARISON: Brain MRI 8/28/2019    FINDINGS:   Intracranial contents: Previously identified mass in the left IAC with  expansion of the left IAC and porus acusticus better appreciated on  MRI most compatible with acoustic neuroma/vestibular schwannoma.  Otherwise no intracranial mass or mass effect. No specific evidence  for acute infarction. Moderate chronic ischemic changes deep white  matter both cerebral hemispheres. Mild generalized cerebral and  cerebellar parenchymal volume loss is age appropriate. Satisfactory  position cerebellar tonsils. Sella appears within normal limits.  Vascular calcification.    Visualized orbits/sinuses/mastoids:  Postprocedure changes both globes  with no acute orbital process. Mild membrane thickening in the ethmoid  air cells. Minimal fluid inferior mastoid air cells.    Osseous structures/soft tissues:  No fracture of the calvarium or  skull base is identified. No significant swelling of the facial/scalp  soft tissues is noted.       Impression    IMPRESSION:   1. No acute intracranial process.  2. Nothing for acute infarction.  3. Mass with extensive change of the left IAC/cerebellopontine angle  redemonstrated, better seen on prior MR, most compatible with  left-sided acoustic  neuroma/vestibular schwannoma.  4. Additional details above.     SRINIVAS ZELAYA MD   CT Chest Pulmonary Embolism w Contrast    Narrative    CT CHEST PULMONARY EMBOLISM WITH CONTRAST  8/15/2020 4:26 PM    CLINICAL HISTORY:  Shortness of breath, elevated D-dimer, right leg  swelling.    TECHNIQUE: CT angiogram chest during arterial phase injection IV  contrast. 2D and 3D MIP reconstructions were performed by the CT  technologist. Dose reduction techniques were used.     CONTRAST: 57 mL Isovue-370    COMPARISON: None.    FINDINGS:  ANGIOGRAM CHEST: Pulmonary arteries are normal caliber and negative  for pulmonary emboli. Thoracic aorta is negative for dissection. No CT  evidence of right heart strain.    LUNGS AND PLEURA: 5 mm nodule in the anterior right middle lobe.  Benign granuloma in the anterior left upper lobe. Emphysema and  scattered areas of fibrosis noted.    MEDIASTINUM/AXILLAE: Some right-sided cardiac enlargement noted. No  discrete adenopathy.    UPPER ABDOMEN: No acute findings.    MUSCULOSKELETAL: No frankly destructive bony lesions.      Impression    IMPRESSION:  1.  No pulmonary embolism demonstrated.  2.  5 mm nodule in the anterior right middle lobe.    Recommendations for one or multiple incidental lung nodules < 6mm:    Low risk patients: No routine follow-up.    High risk patients: Optional follow-up CT at 12 months; if  unchanged, no further follow-up.    *Low Risk: Minimal or absent history of smoking or other known risk  factors.  *Nonsolid (ground glass) or partly solid nodules may require longer  follow-up to exclude indolent adenocarcinoma.  *Recommendations based on Guidelines for the Management of Incidental  Pulmonary Nodules Detected at CT: From the Fleischner Society 2017,  Radiology 2017.    GAMALIEL WEBB MD   US Lower Extremity Venous Duplex Right    Narrative    ULTRASOUND VENOUS RIGHT LOWER EXTREMITY 8/15/2020 5:20 PM     HISTORY:  Leg swollen, elevated D-dimer.    COMPARISON:  None.    TECHNIQUE: Ultrasound gray scale, Color Doppler flow, and spectral  Doppler waveform analysis performed.    FINDINGS:  The right common femoral, superficial femoral, popliteal  and posterior tibial veins are patent and fully compressible and  demonstrate normal venous Doppler flow. The visualized greater  saphenous vein is negative for thrombus. For comparison the left  common femoral vein was evaluated and was unremarkable.      Impression    IMPRESSION: No DVT demonstrated.    GAMALIEL WEBB MD   Echocardiogram Complete    Narrative    [id number]  [id number]  [id number]  [id number]^CHERRIE^MORALES^MARCELLO           [facility]  Echocardiography Laboratory  [address] [place] [id number]        Name: RASHEED MCDANIEL  MRN: [id number]  : 10/09/1931  Study Date: 2020 01:27 PM  Age: 88 yrs  Gender: Female  Patient Location: [facility]  Reason For Study: Dizziness  Ordering Physician: MORALES GRIER  Referring Physician: Shravan Bourgeois  Performed By: Claudia Mendieta     BSA: 1.5 m2  Height: 62 in  Weight: 119 lb  HR: 78  BP: 115/63 mmHg  _____________________________________________________________________________  __        Procedure  Complete Portable Echo Adult. Optison (NDC #[id number]) given intravenously.  _____________________________________________________________________________  __        Interpretation Summary     The visual ejection fraction is estimated at 60-65%.  Left ventricular systolic function is normal.  Mild to moderate valvular aortic stenosis.  The study was technically difficult.  _____________________________________________________________________________  __        Left Ventricle  The left ventricle is normal in size. There is normal left ventricular wall  thickness. Diastolic Doppler findings (E/E' ratio and/or other parameters)  suggest left ventricular filling pressures are indeterminate. The visual  ejection fraction is estimated at 60-65%. Left  ventricular systolic function  is normal.     Right Ventricle  The right ventricle is normal in size and function.     Atria  The left atrium is mildly dilated. Right atrial size is normal. There is no  color Doppler evidence of an atrial shunt.     Mitral Valve  The mitral valve leaflets are mildly thickened. There is mild mitral annular  calcification. The mitral valve chordae are thickened and/or calcified. There  is trace mitral regurgitation.        Tricuspid Valve  There is trace tricuspid regurgitation.     Aortic Valve  The aortic valve is not well visualized. Thickened aortic valve leaflets. No  aortic regurgitation is present. Mild to moderate valvular aortic stenosis.  The calculated aortic valve are is 1.3 cm^2. The peak AoV pressure gradient  is  22.0 mmHg. The mean AoV pressure gradient is 14.1 mmHg.     Pulmonic Valve  There is no pulmonic valvular regurgitation.     Vessels  The aortic root is normal size. Normal size ascending aorta. The inferior vena  cava was normal in size with preserved respiratory variability.     Pericardium  There is no pericardial effusion.        Rhythm  Sinus rhythm was noted.  _____________________________________________________________________________  __  MMode/2D Measurements & Calculations  IVSd: 0.82 cm     LVIDd: 3.3 cm  LVIDs: 2.1 cm  LVPWd: 0.94 cm  FS: 35.3 %  LV mass(C)d: 78.9 grams  LV mass(C)dI: 51.5 grams/m2  Ao root diam: 3.0 cm  LA dimension: 2.8 cm  asc Aorta Diam: 2.6 cm  LA/Ao: 0.94  LVOT diam: 2.0 cm  LVOT area: 3.0 cm2  LA Volume (BP): 53.8 ml  LA Volume Index (BP): 35.2 ml/m2  RWT: 0.56  TAPSE: 2.5 cm           Doppler Measurements & Calculations  MV E max phillip: 96.0 cm/sec  MV A max phillip: 108.4 cm/sec  MV E/A: 0.89  MV dec slope: 339.2 cm/sec2  Ao V2 max: 228.0 cm/sec  Ao max P.0 mmHg  Ao V2 mean: 181.0 cm/sec  Ao mean P.1 mmHg  Ao V2 VTI: 49.1 cm  TONEY(I,D): 1.3 cm2  TONEY(V,D): 1.4 cm2  LV V1 max P.2 mmHg  LV V1 max: 102.4 cm/sec  LV V1  VTI: 20.5 cm  SV(LVOT): 62.1 ml  SI(LVOT): 40.5 ml/m2  PA acc time: 0.07 sec  AV Joe Ratio (DI): 0.45  TONEY Index (cm2/m2): 0.83  E/E' av.7  Lateral E/e': 12.4  Medial E/e': 10.9              _____________________________________________________________________________  __        Report approved by: Ethel Galvan 2020 04:50 PM              Most Recent Lab Results In EPIC (For Non-EPIC Providers):    Most Recent 3 CBC's:  Recent Labs   Lab Test 20  1248 08/15/20  1437 19  1029   WBC 10.3 10.8 7.9   HGB 12.1 13.3 13.4   * 99 103*    252 214      Most Recent 3 BMP's:  Recent Labs   Lab Test 20  0901 20  2047 20  1248 08/15/20  1437     --  141 141   POTASSIUM 3.5 4.5 3.3* 4.1   CHLORIDE 109  --  111* 107   CO2 27  --  26 30   BUN 15  --  22 32*   CR 0.56  --  0.58 0.66   ANIONGAP 4  --  4 4   AMAN 8.2*  --  7.8* 9.2   *  --  99 118*     Most Recent 3 Troponin's:  Recent Labs   Lab Test 08/15/20  1437   TROPI <0.015     Most Recent 3 INR's:No lab results found.  Most Recent 2 LFT's:  Recent Labs   Lab Test 19  1029 18  1024   AST 29 26   ALT 27 25   ALKPHOS 61 57   BILITOTAL 0.9 0.8     Most Recent Cholesterol Panel:  Recent Labs   Lab Test 19  1029   CHOL 182   LDL 94   HDL 73   TRIG 74     Most Recent 6 Bacteria Isolates From Any Culture (See EPIC Reports for Culture Details):No lab results found.  Most Recent TSH, T4 and HgbA1c:  Recent Labs   Lab Test 08/15/20  1437   TSH 9.94*   T4 1.15

## 2020-08-17 NOTE — PROGRESS NOTES
Patient discharged at 4:27 PM to [facility].  IV was discontinued. Pain at time of discharge was 0. Belongings returned to patient.   At time of discharge, patient condition was stable and left the unit escorted by [facility]

## 2020-08-18 ENCOUNTER — NURSING HOME VISIT (OUTPATIENT)
Dept: GERIATRICS | Facility: CLINIC | Age: 85
End: 2020-08-18
Payer: MEDICARE

## 2020-08-18 ENCOUNTER — PATIENT OUTREACH (OUTPATIENT)
Dept: CARE COORDINATION | Facility: CLINIC | Age: 85
End: 2020-08-18

## 2020-08-18 VITALS
HEIGHT: 63 IN | HEART RATE: 99 BPM | TEMPERATURE: 97.3 F | BODY MASS INDEX: 21.08 KG/M2 | DIASTOLIC BLOOD PRESSURE: 76 MMHG | RESPIRATION RATE: 18 BRPM | SYSTOLIC BLOOD PRESSURE: 111 MMHG | OXYGEN SATURATION: 96 %
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
## 2020-08-18 DIAGNOSIS — E03.9 HYPOTHYROIDISM, UNSPECIFIED TYPE: ICD-10-CM

## 2020-08-18 DIAGNOSIS — D33.3 ACOUSTIC NEUROMA (H): Primary | ICD-10-CM

## 2020-08-18 DIAGNOSIS — R53.81 PHYSICAL DECONDITIONING: ICD-10-CM

## 2020-08-18 DIAGNOSIS — R91.1 PULMONARY NODULE, RIGHT: ICD-10-CM

## 2020-08-18 PROCEDURE — 99310 SBSQ NF CARE HIGH MDM 45: CPT | Performed by: NURSE PRACTITIONER

## 2020-08-18 NOTE — LETTER
[facility]   To:   [facility]          Please give to facility    From:   FERMÍN Deshpande Care Coordinator [facility]     Patient Name:  Teresa Bates YOB: 1931   Admit date: 8/17/2020      *Information Needed:  Please contact me when the patient will discharge (or if they will move to long term care)- include the discharge date, disposition, and main diagnosis   - If the patient is discharged with home care services, please provide the name of the agency    Also- Please inform me if a care conference is being held.   Phone, Fax or Email with information       Thank You,   JOSE Deshpande, MSW  Clinic Care Coordinator  [facility]  Ph: [phone]  [email or web address]

## 2020-08-18 NOTE — LETTER
"    8/18/2020        RE: Teresa Bates  [address]  [place] [address]        [facility]  PRIMARY CARE PROVIDER AND CLINIC:  Shravan Bourgeois MD, [address] / [place] [address]  Chief Complaint   Patient presents with     Hospital F/U     [facility] Medical Record Number:  [id number]  Place of Service where encounter took place:  [facility] [[id number]]    Teresa Bates  is a 88 year old  (10/9/1931), admitted to the above facility from  [facility]. Hospital stay 8/15/20 through 8/17/20..  Admitted to this facility for  rehab, medical management and nursing care.    HPI:    HPI information obtained from: facility chart records, facility staff, patient report and [facility] chart review.   Brief Summary of Hospital Course:  PMH aortic stenosis, hypothyroidism, acoustic neuroma who presented after a syncopal episode  Fall/rhabdomyolysis: Hx of acoustic neuroma chronic gait imbalance/dizziness  Has had stereotactic radiation last MRI 8/2019, CT on admission stable, ENT states stable, need NS f/u at some point.  Right medial lobe pulmonary nodule: incidental finding, PCP as OP  Hypothyroidism: TSH elevated 9.94 but free T4 wnl 1.15 repeat TFTs in 6 weeks   Updates on Status Since Skilled nursing Admission: On exam today patient is sitting up in WC, states she cannot hear well out of her left ear, has some dizziness \"I feel unbalanced\" when she turns her head quickly, denies fever, chills, cough, congestion, SOB, N/V/D or constipation. Patient states she slept well last night and appetite is good    CODE STATUS/ADVANCE DIRECTIVES DISCUSSION:   No CPR Do NOT intubate  Patient's living condition: lives with spouse  ALLERGIES: No known allergies  PAST MEDICAL HISTORY:  has a past medical history of Acoustic neuroma (H) (8/24/2016), Hypothyroidism, unspecified type (8/24/2016), and Osteopenia (8/24/2016).  PAST SURGICAL HISTORY:   has " "a past surgical history that includes Stereotactic radiation to acustic neuroma on left with Dr. Galindo (Left, 05/10/200).  FAMILY HISTORY: family history includes Family History Negative in her mother; Multiple Sclerosis in her daughter; Myocardial Infarction in her father; Osteoporosis in her daughter.  SOCIAL HISTORY:   reports that she has quit smoking. Her smoking use included cigarettes. She has a 30.00 pack-year smoking history. She has never used smokeless tobacco. She reports that she does not drink alcohol or use drugs.    Post Discharge Medication Reconciliation Status: discharge medications reconciled, continue medications without change    Current Outpatient Medications   Medication Sig Dispense Refill     ASPIRIN 81 MG PO TABS 1 TABLET DAILY       calcium-vitamin D (CALTRATE) 600-400 MG-UNIT per tablet Take 1 tablet by mouth daily (before lunch)        ibuprofen (ADVIL/MOTRIN) 200 MG tablet Take 200 mg by mouth every morning       levothyroxine (SYNTHROID/LEVOTHROID) 75 MCG tablet Take 1 tablet (75 mcg) by mouth daily 90 tablet 3     Multiple Vitamins-Minerals (CENTRUM SILVER 50+WOMEN PO) Take 1 tablet by mouth daily (before lunch)        polyethylene glycol (MIRALAX) 17 g packet Take 17 g by mouth daily as needed for constipation           ROS:  10 point ROS of systems including Constitutional, Eyes, Respiratory, Cardiovascular, Gastroenterology, Genitourinary, Integumentary, Musculoskeletal, Psychiatric were all negative except for pertinent positives noted in my HPI.    Vitals:  /76   Pulse 99   Temp 97.3  F (36.3  C)   Resp 18   Ht 1.6 m (5' 3\")   SpO2 96%   BMI 21.08 kg/m    Exam:  GENERAL APPEARANCE:  Alert, in no distress  ENT:  Mouth and posterior oropharynx normal, moist mucous membranes, [place]  EYES:  EOM, conjunctivae, lids, pupils and irises normal, PERRL  NECK:  .  RESP:  no respiratory distress  CV:  peripheral edema 1+ in LE bilaterally  ABDOMEN:  abdomen round  M/S:   " sitting up in WC, able to move all 4 extremities  SKIN:  Inspection of skin and subcutaneous tissue baseline  NEURO:   speech WNL  PSYCH:  affect and mood normal    Lab/Diagnostic data:    Most Recent 3 CBC's:  Recent Labs   Lab Test 08/16/20  1248 08/15/20  1437 08/22/19  1029   WBC 10.3 10.8 7.9   HGB 12.1 13.3 13.4   * 99 103*    252 214     Most Recent 3 BMP's:  Recent Labs   Lab Test 08/17/20  0901 08/16/20  2047 08/16/20  1248 08/15/20  1437     --  141 141   POTASSIUM 3.5 4.5 3.3* 4.1   CHLORIDE 109  --  111* 107   CO2 27  --  26 30   BUN 15  --  22 32*   CR 0.56  --  0.58 0.66   ANIONGAP 4  --  4 4   AMAN 8.2*  --  7.8* 9.2   *  --  99 118*       ASSESSMENT/PLAN:  Fall, subsequent encounter  Physical deconditioning  Acoustic neuroma (H)  Acute/ongoing: PT and OT for strengthening  H/o stereotactic radiation followed by Dr. Galindo    Pulmonary nodule, right  Acute/incidental finding on CT  F/u as OP PCP      Hypothyroidism, unspecified type  Acute/ongoing: TFTs in 6 weeks  Continue synthroid 75mcg QD           Orders written by provider at facility  CBC and BMP on Thursday    Total time spent with patient visit at the skilled nursing facility was 35 min including patient visit and review of past records. Greater than 50% of total time spent with counseling and coordinating care due to discussed medications, therapy POC, will continue current medications, labs on Thursday, will work with therapy with goal to return to condo alone. Education on calling and asking for help before getting up on own. .     Electronically signed by:  Tonya Lynn Haase, APRN CNP                       Sincerely,        Tonya Lynn Haase, APRN CNP

## 2020-08-18 NOTE — PROGRESS NOTES
Clinic Care Coordination Contact  Care Coordination Transition Communication    Referral Source: IP Report    Clinical Data: Patient was hospitalized at [facility]  from 8/15 to 8/17 with diagnosis of syncopal episode, unwitnessed fall and noted with mild rhabdomyolysis..     Transition to Facility:              Facility Name: [facility]              Contact name and phone number/fax: [phone]/ [phone]    Plan: RN/SW Care Coordinator will await notification from facility staff informing RN/SW Care Coordinator of patient's discharge plans/needs. RN/SW Care Coordinator will review chart and outreach to facility staff every 4 weeks and as needed. Fax sent to RODRIGO [place] with my contact information requesting notification upon discharge.    RENYN Deshpande, LGFERMÍN  Clinic Care Coordinator  [facility]  [phone]  [email or web address]

## 2020-08-18 NOTE — PROGRESS NOTES
"[facility]  PRIMARY CARE PROVIDER AND CLINIC:  Shravan Bourgeois MD, [address] / [place] [address]  Chief Complaint   Patient presents with     Hospital F/U     [place] Medical Record Number:  [id number]  Place of Service where encounter took place:  [facility] ([facility]) [[id number]]    Teresa Bates  is a 88 year old  (10/9/1931), admitted to the above facility from  [facility]. Hospital stay 8/15/20 through 8/17/20..  Admitted to this facility for  rehab, medical management and nursing care.    HPI:    HPI information obtained from: facility chart records, facility staff, patient report and [facility] chart review.   Brief Summary of Hospital Course:  PMH aortic stenosis, hypothyroidism, acoustic neuroma who presented after a syncopal episode  Fall/rhabdomyolysis: Hx of acoustic neuroma chronic gait imbalance/dizziness  Has had stereotactic radiation last MRI 8/2019, CT on admission stable, ENT states stable, need NS f/u at some point.  Right medial lobe pulmonary nodule: incidental finding, PCP as OP  Hypothyroidism: TSH elevated 9.94 but free T4 wnl 1.15 repeat TFTs in 6 weeks   Updates on Status Since Skilled nursing Admission: On exam today patient is sitting up in WC, states she cannot hear well out of her left ear, has some dizziness \"I feel unbalanced\" when she turns her head quickly, denies fever, chills, cough, congestion, SOB, N/V/D or constipation. Patient states she slept well last night and appetite is good    CODE STATUS/ADVANCE DIRECTIVES DISCUSSION:   No CPR Do NOT intubate  Patient's living condition: lives with spouse  ALLERGIES: No known allergies  PAST MEDICAL HISTORY:  has a past medical history of Acoustic neuroma (H) (8/24/2016), Hypothyroidism, unspecified type (8/24/2016), and Osteopenia (8/24/2016).  PAST SURGICAL HISTORY:   has a past surgical history that includes Stereotactic radiation to acustic neuroma on left with Dr." "Kenyabelia (Left, 05/10/200).  FAMILY HISTORY: family history includes Family History Negative in her mother; Multiple Sclerosis in her daughter; Myocardial Infarction in her father; Osteoporosis in her daughter.  SOCIAL HISTORY:   reports that she has quit smoking. Her smoking use included cigarettes. She has a 30.00 pack-year smoking history. She has never used smokeless tobacco. She reports that she does not drink alcohol or use drugs.    Post Discharge Medication Reconciliation Status: discharge medications reconciled, continue medications without change    Current Outpatient Medications   Medication Sig Dispense Refill     ASPIRIN 81 MG PO TABS 1 TABLET DAILY       calcium-vitamin D (CALTRATE) 600-400 MG-UNIT per tablet Take 1 tablet by mouth daily (before lunch)        ibuprofen (ADVIL/MOTRIN) 200 MG tablet Take 200 mg by mouth every morning       levothyroxine (SYNTHROID/LEVOTHROID) 75 MCG tablet Take 1 tablet (75 mcg) by mouth daily 90 tablet 3     Multiple Vitamins-Minerals (CENTRUM SILVER 50+WOMEN PO) Take 1 tablet by mouth daily (before lunch)        polyethylene glycol (MIRALAX) 17 g packet Take 17 g by mouth daily as needed for constipation           ROS:  10 point ROS of systems including Constitutional, Eyes, Respiratory, Cardiovascular, Gastroenterology, Genitourinary, Integumentary, Musculoskeletal, Psychiatric were all negative except for pertinent positives noted in my HPI.    Vitals:  /76   Pulse 99   Temp 97.3  F (36.3  C)   Resp 18   Ht 1.6 m (5' 3\")   SpO2 96%   BMI 21.08 kg/m    Exam:  GENERAL APPEARANCE:  Alert, in no distress  ENT:  Mouth and posterior oropharynx normal, moist mucous membranes, [place]  EYES:  EOM, conjunctivae, lids, pupils and irises normal, PERRL  NECK:  .  RESP:  no respiratory distress  CV:  peripheral edema 1+ in LE bilaterally  ABDOMEN:  abdomen round  M/S:   sitting up in WC, able to move all 4 extremities  SKIN:  Inspection of skin and subcutaneous tissue " baseline  NEURO:   speech WNL  PSYCH:  affect and mood normal    Lab/Diagnostic data:    Most Recent 3 CBC's:  Recent Labs   Lab Test 08/16/20  1248 08/15/20  1437 08/22/19  1029   WBC 10.3 10.8 7.9   HGB 12.1 13.3 13.4   * 99 103*    252 214     Most Recent 3 BMP's:  Recent Labs   Lab Test 08/17/20  0901 08/16/20  2047 08/16/20  1248 08/15/20  1437     --  141 141   POTASSIUM 3.5 4.5 3.3* 4.1   CHLORIDE 109  --  111* 107   CO2 27  --  26 30   BUN 15  --  22 32*   CR 0.56  --  0.58 0.66   ANIONGAP 4  --  4 4   AMAN 8.2*  --  7.8* 9.2   *  --  99 118*       ASSESSMENT/PLAN:  Fall, subsequent encounter  Physical deconditioning  Acoustic neuroma (H)  Acute/ongoing: PT and OT for strengthening  H/o stereotactic radiation followed by Dr. Galindo    Pulmonary nodule, right  Acute/incidental finding on CT  F/u as OP PCP      Hypothyroidism, unspecified type  Acute/ongoing: TFTs in 6 weeks  Continue synthroid 75mcg QD           Orders written by provider at facility  CBC and BMP on Thursday    Total time spent with patient visit at the [facility] nursing facility was 35 min including patient visit and review of past records. Greater than 50% of total time spent with counseling and coordinating care due to discussed medications, therapy POC, will continue current medications, labs on Thursday, will work with therapy with goal to return to condo alone. Education on calling and asking for help before getting up on own. .     Electronically signed by:  Tonya Lynn Haase, APRN CNP

## 2020-08-20 ENCOUNTER — NURSING HOME VISIT (OUTPATIENT)
Dept: GERIATRICS | Facility: CLINIC | Age: 85
End: 2020-08-20
Payer: MEDICARE

## 2020-08-20 DIAGNOSIS — E03.9 HYPOTHYROIDISM, UNSPECIFIED TYPE: ICD-10-CM

## 2020-08-20 DIAGNOSIS — R91.1 PULMONARY NODULE, RIGHT: ICD-10-CM
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
## 2020-08-20 DIAGNOSIS — D33.3 ACOUSTIC NEUROMA (H): ICD-10-CM

## 2020-08-20 PROCEDURE — 99305 1ST NF CARE MODERATE MDM 35: CPT | Performed by: INTERNAL MEDICINE

## 2020-08-22 ENCOUNTER — TELEPHONE (OUTPATIENT)
Dept: GERIATRICS | Facility: CLINIC | Age: 85
End: 2020-08-22

## 2020-08-22 ENCOUNTER — TRANSFERRED RECORDS (OUTPATIENT)
Dept: HEALTH INFORMATION MANAGEMENT | Facility: CLINIC | Age: 85
End: 2020-08-22

## 2020-08-22 NOTE — TELEPHONE ENCOUNTER
Resident complaining of burning with urination.    Ok to collect a UA/UC    Electronically signed by Kaylene Morales RN, CNP

## 2020-08-22 NOTE — PROGRESS NOTES
[facility]  PRIMARY CARE PROVIDER AND CLINIC:  Shravan Bourgeois MD, [address] / [place] [address]    Pt was seen by Dr Ewing on 8/20/20 at the [facility] for an initial TCU visit    Pt is a 88 year old  (10/9/1931), admitted to the above facility from  [facility]. Hospital stay 8/15/20 through 8/17/20..  Admitted to this facility for  rehab, medical management and nursing care.      Hospital course was reviewed by me, is as per the hospital discharge summary and NP note.    Pt was admitted for the evaluation of a syncopal episode  History if acoustic neuroma with chronic gait instability, history of mild to mod aortic stenosis.  CPK mildly elevated on admission, Cr nl  No arhythmia noted.   Incidentally noted R medial lobe pulmonary nodule  TSH mildly elevated with nl free T4      Pt reports feeling well.  She has mild dizziness with standing, notes mild gait instability. She is walking with her walker in therapy.  She denies chest pain, cough, abd pain, change in appetite, HA, fevers, chills, dysuria, vision changes, difficulty swallowing.    CODE STATUS/ADVANCE DIRECTIVES DISCUSSION:   No CPR Do NOT intubate  Patient's living condition: lives with spouse  ALLERGIES: No known allergies  PAST MEDICAL HISTORY:  has a past medical history of Acoustic neuroma (H) (8/24/2016), Hypothyroidism, unspecified type (8/24/2016), and Osteopenia (8/24/2016).  PAST SURGICAL HISTORY:   has a past surgical history that includes Stereotactic radiation to acustic neuroma on left with Dr. Galindo (Left, 05/10/200).  FAMILY HISTORY: family history includes Family History Negative in her mother; Multiple Sclerosis in her daughter; Myocardial Infarction in her father; Osteoporosis in her daughter.  SOCIAL HISTORY:   reports that she has quit smoking. Her smoking use included cigarettes. She has a 30.00 pack-year smoking history. She has never used smokeless tobacco. She reports  that she does not drink alcohol or use drugs.        Current Outpatient Medications   Medication Sig Dispense Refill     ASPIRIN 81 MG PO TABS 1 TABLET DAILY       calcium-vitamin D (CALTRATE) 600-400 MG-UNIT per tablet Take 1 tablet by mouth daily (before lunch)        ibuprofen (ADVIL/MOTRIN) 200 MG tablet Take 200 mg by mouth every morning       levothyroxine (SYNTHROID/LEVOTHROID) 75 MCG tablet Take 1 tablet (75 mcg) by mouth daily 90 tablet 3     Multiple Vitamins-Minerals (CENTRUM SILVER 50+WOMEN PO) Take 1 tablet by mouth daily (before lunch)        polyethylene glycol (MIRALAX) 17 g packet Take 17 g by mouth daily as needed for constipation           ROS:  10 point ROS neg except as noted above.      Exam:  GENERAL APPEARANCE:  Alert, in no distress, well appearing, lying in bed  ENT: [place], no oral mucosa moist  EYES:  EOMI. No eye redness or drainage  NECK:  supple.  RESP: Lungs clear  CV:  RRR  ABDOMEN: soft  M/S:  No LE edema  SKIN:  No rash  NEURO:  Alert, fully oriented, face symmetric. No gross focal weakness. No tremors. Gait was not assessed  PSYCH:  affect and mood normal    Lab/Diagnostic data:    Most Recent 3 CBC's:  Recent Labs   Lab Test 08/16/20  1248 08/15/20  1437 08/22/19  1029   WBC 10.3 10.8 7.9   HGB 12.1 13.3 13.4   * 99 103*    252 214     Most Recent 3 BMP's:  Recent Labs   Lab Test 08/17/20  0901 08/16/20  2047 08/16/20  1248 08/15/20  1437     --  141 141   POTASSIUM 3.5 4.5 3.3* 4.1   CHLORIDE 109  --  111* 107   CO2 27  --  26 30   BUN 15  --  22 32*   CR 0.56  --  0.58 0.66   ANIONGAP 4  --  4 4   MAAN 8.2*  --  7.8* 9.2   *  --  99 118*       ASSESSMENT/PLAN:    Fall, subsequent encounter  Physical deconditioning  Acoustic neuroma (H)  Acute/ongoing: PT and OT for strengthening  ENT f/u    Pulmonary nodule, right  Acute/incidental finding on CT  F/u as OP PCP      Hypothyroidism, unspecified type  Acute/ongoing: TFTs in 6 weeks  Continue synthroid 75mcg  LO Ewing MD

## 2020-08-23 ASSESSMENT — MIFFLIN-ST. JEOR: SCORE: 940.72

## 2020-08-24 ENCOUNTER — TELEPHONE (OUTPATIENT)
Dept: FAMILY MEDICINE | Facility: CLINIC | Age: 85
End: 2020-08-24

## 2020-08-24 NOTE — TELEPHONE ENCOUNTER
Reason for Call:  Request for results:    Name of test or procedure: Unknown, taken at [facility]    Date of test of procedure: Last week or two weeks before, date unknown    Location of the test or procedure: [facility]    OK to leave the result message on voice mail or with a family member? NO.    Phone number Patient can be reached at:  Other phone number:  [phone]    Additional comments: Pt very insistent on asking her doctor if he has the results from all the tests taken at [facility] and became upset when representative offered to make an appointment for her.     Call taken on 8/24/2020 at 4:23 PM by Munira Lucero

## 2020-08-24 NOTE — PROGRESS NOTES
"[facility]  [place] Medical Record Number: [id number]  Place of Service where encounter took place: [facility] ([facility]) [[id number]]  Chief Complaint   Patient presents with     RECHECK       HPI:    Teresa Bates is a 88 year old (10/9/1931), who is being seen today for an episodic care visit. HPI information obtained from: facility chart records, facility staff, patient report and [facility] chart review. Today's concern is:  Deconditioning: today patient walking with therapy up to 150 feet using a RW with SBA, feels she is getting stronger  UTI: denies dysuria, states she is feeling much better since start of Abx, was started on IV rocephin on 8/21 per on call MD, will switch to oral medication  Pulmonary nodule: denies SOB, cough, congestion    Past Medical and Surgical History reviewed in Epic today.    MEDICATIONS:    Current Outpatient Medications   Medication Sig Dispense Refill     ASPIRIN 81 MG PO TABS 1 TABLET DAILY       calcium-vitamin D (CALTRATE) 600-400 MG-UNIT per tablet Take 1 tablet by mouth daily (before lunch)        ibuprofen (ADVIL/MOTRIN) 200 MG tablet Take 200 mg by mouth every morning       levothyroxine (SYNTHROID/LEVOTHROID) 75 MCG tablet Take 1 tablet (75 mcg) by mouth daily 90 tablet 3     Multiple Vitamins-Minerals (CENTRUM SILVER 50+WOMEN PO) Take 1 tablet by mouth daily (before lunch)        polyethylene glycol (MIRALAX) 17 g packet Take 17 g by mouth daily as needed for constipation       REVIEW OF SYSTEMS:  10 point ROS of systems including Constitutional, Eyes, Respiratory, Cardiovascular, Gastroenterology, Genitourinary, Integumentary, Musculoskeletal, Psychiatric were all negative except for pertinent positives noted in my HPI.    Objective:  /63   Pulse 67   Temp 96.8  F (36  C)   Resp 16   Ht 1.6 m (5' 3\")   Wt 54.2 kg (119 lb 6.4 oz)   SpO2 96%   BMI 21.15 kg/m    Exam:  GENERAL APPEARANCE:  Alert, in no distress  ENT:  Mouth and " posterior oropharynx normal, moist mucous membranes, [place]  EYES:  EOM, conjunctivae, lids, pupils and irises normal, PERRL  NECK:  .  RESP:  no respiratory distress  CV:  peripheral edema 1+ in LE bilaterally  ABDOMEN:  abdomen round  M/S:   sitting up in WC, able to move all 4 extremities  SKIN:  Inspection of skin and subcutaneous tissue baseline  NEURO:   speech WNL  PSYCH:  affect and mood normal       Labs:     Most Recent 3 CBC's:  Recent Labs   Lab Test 08/16/20  1248 08/15/20  1437 08/22/19  1029   WBC 10.3 10.8 7.9   HGB 12.1 13.3 13.4   * 99 103*    252 214     Most Recent 3 BMP's:  Recent Labs   Lab Test 08/17/20  0901 08/16/20  2047 08/16/20  1248 08/15/20  1437     --  141 141   POTASSIUM 3.5 4.5 3.3* 4.1   CHLORIDE 109  --  111* 107   CO2 27  --  26 30   BUN 15  --  22 32*   CR 0.56  --  0.58 0.66   ANIONGAP 4  --  4 4   AMAN 8.2*  --  7.8* 9.2   *  --  99 118*       ASSESSMENT/PLAN:  Fall, subsequent encounter  Physical deconditioning  Acoustic neuroma (H)  Acute/ongoing: PT and OT for strengthening  H/o stereotactic radiation followed by Dr. Galindo     UTI: acute/ongiong:   DC rocephin  Start macrobid 100mg BID for 5 more days  Monitor for fever, chills, dysuria    Pulmonary nodule, right  Acute/incidental finding on CT  F/u as OP PCP        Hypothyroidism, unspecified type  Acute/ongoing: TFTs in 6 weeks  Continue synthroid 75mcg QD    Orders written by provider at facility  BMP in AM  DC rocephin  Start macrobid 100mg BID for 5 days    Total time spent with patient visit at the skilled nursing facility was 35 min including patient visit and review of past records. Greater than 50% of total time spent with counseling and coordinating care due to discussed IV rocephin for UTI, improvement in symptoms, will transition to oral Abx, education on reporting to nursing if return of symptoms, dysuria, fever, chills. Patient doing well in therapy, discussed POC and  medications.  Electronically signed by:  Tonya Lynn Haase, APRN CNP

## 2020-08-25 ENCOUNTER — NURSING HOME VISIT (OUTPATIENT)
Dept: GERIATRICS | Facility: CLINIC | Age: 85
End: 2020-08-25
Payer: MEDICARE

## 2020-08-25 VITALS
HEIGHT: 63 IN | HEART RATE: 67 BPM | RESPIRATION RATE: 16 BRPM | OXYGEN SATURATION: 96 % | WEIGHT: 119.4 LBS | BODY MASS INDEX: 21.16 KG/M2 | DIASTOLIC BLOOD PRESSURE: 63 MMHG | TEMPERATURE: 96.8 F | SYSTOLIC BLOOD PRESSURE: 115 MMHG

## 2020-08-25 DIAGNOSIS — D33.3 ACOUSTIC NEUROMA (H): ICD-10-CM

## 2020-08-25 DIAGNOSIS — N39.0 URINARY TRACT INFECTION WITHOUT HEMATURIA, SITE UNSPECIFIED: ICD-10-CM

## 2020-08-25 DIAGNOSIS — R91.1 PULMONARY NODULE, RIGHT: ICD-10-CM
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
## 2020-08-25 DIAGNOSIS — E03.9 HYPOTHYROIDISM, UNSPECIFIED TYPE: ICD-10-CM

## 2020-08-25 DIAGNOSIS — R53.81 PHYSICAL DECONDITIONING: ICD-10-CM

## 2020-08-25 PROCEDURE — 99310 SBSQ NF CARE HIGH MDM 45: CPT | Performed by: NURSE PRACTITIONER

## 2020-08-25 RX ORDER — NITROFURANTOIN 25; 75 MG/1; MG/1
100 CAPSULE ORAL 2 TIMES DAILY
Status: DISCONTINUED | OUTPATIENT
Start: 2020-08-25 | End: 2020-08-25

## 2020-08-25 RX ADMIN — NITROFURANTOIN 100 MG: 25; 75 CAPSULE ORAL at 13:03

## 2020-08-25 NOTE — LETTER
"    8/25/2020        RE: Teresa Bates  [address]  [address]  [place] [facility] [address]        [facility]  [place] Medical Record Number: [id number]  Place of Service where encounter took place: [facility] ([facility] [[id number]]  Chief Complaint   Patient presents with     RECHECK       HPI:    Teresa Bates is a 88 year old (10/9/1931), who is being seen today for an episodic care visit. HPI information obtained from: facility chart records, facility staff, patient report and [facility] chart review. Today's concern is:  Deconditioning: today patient walking with therapy up to 150 feet using a RW with SBA, feels she is getting stronger  UTI: denies dysuria, states she is feeling much better since start of Abx, was started on IV rocephin on 8/21 per on call MD, will switch to oral medication  Pulmonary nodule: denies SOB, cough, congestion    Past Medical and Surgical History reviewed in Epic today.    MEDICATIONS:    Current Outpatient Medications   Medication Sig Dispense Refill     ASPIRIN 81 MG PO TABS 1 TABLET DAILY       calcium-vitamin D (CALTRATE) 600-400 MG-UNIT per tablet Take 1 tablet by mouth daily (before lunch)        ibuprofen (ADVIL/MOTRIN) 200 MG tablet Take 200 mg by mouth every morning       levothyroxine (SYNTHROID/LEVOTHROID) 75 MCG tablet Take 1 tablet (75 mcg) by mouth daily 90 tablet 3     Multiple Vitamins-Minerals (CENTRUM SILVER 50+WOMEN PO) Take 1 tablet by mouth daily (before lunch)        polyethylene glycol (MIRALAX) 17 g packet Take 17 g by mouth daily as needed for constipation       REVIEW OF SYSTEMS:  10 point ROS of systems including Constitutional, Eyes, Respiratory, Cardiovascular, Gastroenterology, Genitourinary, Integumentary, Musculoskeletal, Psychiatric were all negative except for pertinent positives noted in my HPI.    Objective:  /63   Pulse 67   Temp 96.8  F (36  C)   Resp 16   Ht 1.6 m (5' 3\")   Wt 54.2 kg (119 lb 6.4 " oz)   SpO2 96%   BMI 21.15 kg/m    Exam:  GENERAL APPEARANCE:  Alert, in no distress  ENT:  Mouth and posterior oropharynx normal, moist mucous membranes, [place]  EYES:  EOM, conjunctivae, lids, pupils and irises normal, PERRL  NECK:  .  RESP:  no respiratory distress  CV:  peripheral edema 1+ in LE bilaterally  ABDOMEN:  abdomen round  M/S:   sitting up in WC, able to move all 4 extremities  SKIN:  Inspection of skin and subcutaneous tissue baseline  NEURO:   speech WNL  PSYCH:  affect and mood normal       Labs:     Most Recent 3 CBC's:  Recent Labs   Lab Test 08/16/20  1248 08/15/20  1437 08/22/19  1029   WBC 10.3 10.8 7.9   HGB 12.1 13.3 13.4   * 99 103*    252 214     Most Recent 3 BMP's:  Recent Labs   Lab Test 08/17/20  0901 08/16/20  2047 08/16/20  1248 08/15/20  1437     --  141 141   POTASSIUM 3.5 4.5 3.3* 4.1   CHLORIDE 109  --  111* 107   CO2 27  --  26 30   BUN 15  --  22 32*   CR 0.56  --  0.58 0.66   ANIONGAP 4  --  4 4   AMAN 8.2*  --  7.8* 9.2   *  --  99 118*       ASSESSMENT/PLAN:  Fall, subsequent encounter  Physical deconditioning  Acoustic neuroma (H)  Acute/ongoing: PT and OT for strengthening  H/o stereotactic radiation followed by Dr. Galindo     UTI: acute/ongiong:   DC rocephin  Start macrobid 100mg BID for 5 more days  Monitor for fever, chills, dysuria    Pulmonary nodule, right  Acute/incidental finding on CT  F/u as OP PCP        Hypothyroidism, unspecified type  Acute/ongoing: TFTs in 6 weeks  Continue synthroid 75mcg QD    Orders written by provider at facility  BMP in AM  DC rocephin  Start macrobid 100mg BID for 5 days    Total time spent with patient visit at the [facility] nursing facility was 35 min including patient visit and review of past records. Greater than 50% of total time spent with counseling and coordinating care due to discussed IV rocephin for UTI, improvement in symptoms, will transition to oral Abx, education on reporting to nursing if  return of symptoms, dysuria, fever, chills. Patient doing well in therapy, discussed POC and medications.  Electronically signed by:  Tonya Lynn Haase, APRN CNP         Sincerely,        Tonya Lynn Haase, APRN CNP

## 2020-08-25 NOTE — TELEPHONE ENCOUNTER
No ans, left message to call clinic to schedule Video Visit for Hosp Followup per Dr. Bourgeois.    done

## 2020-08-25 NOTE — TELEPHONE ENCOUNTER
She needs to be scheduled for a hospital follow up appointment  Video visit would be best  Can someone help her set that up?

## 2020-08-27 ENCOUNTER — TELEPHONE (OUTPATIENT)
Dept: FAMILY MEDICINE | Facility: CLINIC | Age: 85
End: 2020-08-27

## 2020-08-27 ENCOUNTER — VIRTUAL VISIT (OUTPATIENT)
Dept: FAMILY MEDICINE | Facility: CLINIC | Age: 85
End: 2020-08-27
Payer: MEDICARE

## 2020-08-27 DIAGNOSIS — D33.3 ACOUSTIC NEUROMA (H): ICD-10-CM

## 2020-08-27 DIAGNOSIS — T79.6XXD TRAUMATIC RHABDOMYOLYSIS, SUBSEQUENT ENCOUNTER: Primary | ICD-10-CM

## 2020-08-27 DIAGNOSIS — R91.8 PULMONARY NODULES: ICD-10-CM

## 2020-08-27 DIAGNOSIS — E03.9 HYPOTHYROIDISM, UNSPECIFIED TYPE: ICD-10-CM

## 2020-08-27 PROCEDURE — 99495 TRANSJ CARE MGMT MOD F2F 14D: CPT | Mod: 95 | Performed by: INTERNAL MEDICINE

## 2020-08-27 NOTE — PROGRESS NOTES
"Teresa Bates is a 88 year old female who is being evaluated via a billable telephone visit.      The patient has been notified of following:     \"This telephone visit will be conducted via a call between you and your physician/provider. We have found that certain health care needs can be provided without the need for a physical exam.  This service lets us provide the care you need with a short phone conversation.  If a prescription is necessary we can send it directly to your pharmacy.  If lab work is needed we can place an order for that and you can then stop by our lab to have the test done at a later time.    Telephone visits are billed at different rates depending on your insurance coverage. During this emergency period, for some insurers they may be billed the same as an in-person visit.  Please reach out to your insurance provider with any questions.    If during the course of the call the physician/provider feels a telephone visit is not appropriate, you will not be charged for this service.\"    Patient has given verbal consent for Telephone visit?  Yes    What phone number would you like to be contacted at? [phone]    How would you like to obtain your AVS? Mail a copy    Subjective     Teresa Bates is a 88 year old female who presents via phone visit today for the following health issues:    \A Chronology of [place] Hospit[facility]\""      Hospital Follow-up Visit:    Hospital/Nursing Home/IP Rehab Facility: [facility]   Date of Admission: 8-  Date of Discharge: 08/17/2020  Reason(s) for Admission: syncopal episode, unwitnessed fall and noted with mild rhabdomyolysis.      Was your hospitalization related to COVID-19? No   Problems taking medications regularly:  None  Medication changes since discharge: None  Problems adhering to non-medication therapy:  None    Summary of hospitalization:  [facility] discharge summary reviewed  Diagnostic Tests/Treatments reviewed.  Follow up needed: me  Other " Healthcare Providers Involved in Patient s Care:         None  Update since discharge: improved. Post Discharge Medication Reconciliation: discharge medications reconciled, continue medications without change.  Plan of care communicated with patient               Hospitalized after being found down  CK was elevated  Improved with fluids  She feels better  She is apparently still at [facility] home  TSH was high, but FT4 was normal  Lung nodule identified, repeat in 8/2021 recommended  Acoustic neuroma was stable     Review of Systems   Constitutional, HEENT, cardiovascular, pulmonary, gi and gu systems are negative, except as otherwise noted.       Objective          Vitals:  No vitals were obtained today due to virtual visit.    GEN:  healthy, alert and no distress  PSYCH: Alert and oriented times 3; a little forgetful   RESP: No cough, no audible wheezing, able to talk in full sentences  Remainder of exam unable to be completed due to telephone visits    Labs reviewed in EPIC        Assessment/Plan:    Assessment & Plan     Teresa was seen today for hospital f/u.    Diagnoses and all orders for this visit:    Traumatic rhabdomyolysis, subsequent encounter    Hypothyroidism, unspecified type    Pulmonary nodules    Acoustic neuroma (H)    Unfortunately there is increasing frailty   Recommended fall pendant  Etiology of syncope spell unclear   We can follow up lung nodule in 12 months   Recheck TFTs when she returns to clinic   I think we should see her in October, or sooner if needed       Return in about 2 months (around 10/27/2020) for office visit with Dr. Bourgeois .  Patient instructed to return to clinic or contact us sooner if symptoms worsen or new symptoms develop.     Shravan Bourgeois MD  [facility]    Phone call duration:  13 minutes

## 2020-08-27 NOTE — TELEPHONE ENCOUNTER
Visit Disposition     Dispositions  Check-out Note    0 Return in about 2 months (around 10/27/2020) for office visit with Dr. Bourgeois .  Call patient to schedule follow up          No ans.  Try calling tomorrow to schedule.

## 2020-08-28 NOTE — TELEPHONE ENCOUNTER
I called pt at [facility] [phone] and she said she needs to check her calendar at home in order to schedule.  She said she will call clinic to schedule when she gets home.      Radha AVILEZ MA

## 2020-09-03 ENCOUNTER — NURSING HOME VISIT (OUTPATIENT)
Dept: GERIATRICS | Facility: CLINIC | Age: 85
End: 2020-09-03
Payer: MEDICARE

## 2020-09-03 VITALS
SYSTOLIC BLOOD PRESSURE: 118 MMHG | TEMPERATURE: 97.9 F | OXYGEN SATURATION: 97 % | HEART RATE: 80 BPM | RESPIRATION RATE: 16 BRPM | DIASTOLIC BLOOD PRESSURE: 77 MMHG

## 2020-09-03 DIAGNOSIS — R91.1 PULMONARY NODULE, RIGHT: ICD-10-CM

## 2020-09-03 DIAGNOSIS — D33.3 ACOUSTIC NEUROMA (H): Primary | ICD-10-CM

## 2020-09-03 DIAGNOSIS — N39.0 URINARY TRACT INFECTION WITHOUT HEMATURIA, SITE UNSPECIFIED: ICD-10-CM

## 2020-09-03 DIAGNOSIS — R53.81 PHYSICAL DECONDITIONING: ICD-10-CM

## 2020-09-03 PROCEDURE — 99309 SBSQ NF CARE MODERATE MDM 30: CPT | Performed by: NURSE PRACTITIONER

## 2020-09-03 NOTE — LETTER
9/3/2020        RE: Teresa Bates  [address]  [address]  [facility] [address]        [place] [facility]  [place] [facility] Record Number:  [id number]  Place of Service where encounter took place:  [facility] (S) [[id number]]  Chief Complaint   Patient presents with     Nursing Home Acute       HPI:    Teresa Bates  is a 88 year old (10/9/1931), who is being seen today for an episodic care visit.  HPI information obtained from: facility chart records, facility staff, patient report and [facility] chart review. Today's concern is:  Deconditioning: today patient walking with therapy >  300 feet using a RW with SBA, feels she is getting stronger  UTI: no signs of infection  Pulmonary nodule: denies SOB, cough, congestion      Past Medical and Surgical History reviewed in Epic today.    MEDICATIONS:    Current Outpatient Medications   Medication Sig Dispense Refill     ASPIRIN 81 MG PO TABS 1 TABLET DAILY       calcium-vitamin D (CALTRATE) 600-400 MG-UNIT per tablet Take 1 tablet by mouth daily (before lunch)        ibuprofen (ADVIL/MOTRIN) 200 MG tablet Take 200 mg by mouth every morning       levothyroxine (SYNTHROID/LEVOTHROID) 75 MCG tablet Take 1 tablet (75 mcg) by mouth daily 90 tablet 3     Multiple Vitamins-Minerals (CENTRUM SILVER 50+WOMEN PO) Take 1 tablet by mouth daily (before lunch)        polyethylene glycol (MIRALAX) 17 g packet Take 17 g by mouth daily as needed for constipation           REVIEW OF SYSTEMS:  10 point ROS of systems including Constitutional, Eyes, Respiratory, Cardiovascular, Gastroenterology, Genitourinary, Integumentary, Musculoskeletal, Psychiatric were all negative except for pertinent positives noted in my HPI.    Objective:  /77   Pulse 80   Temp 97.9  F (36.6  C)   Resp 16   SpO2 97%   Exam:  GENERAL APPEARANCE:  Alert, in no distress  ENT:  Mouth and posterior oropharynx normal, moist mucous membranes, [place]  EYES:  EOM,  conjunctivae, lids, pupils and irises normal, PERRL  NECK:  .  RESP:  no respiratory distress  CV:  peripheral edema 1+ in LE bilaterally  ABDOMEN:  abdomen round  M/S:   sitting up in WC, able to move all 4 extremities  SKIN:  Inspection of skin and subcutaneous tissue baseline  NEURO:   speech WNL  PSYCH:  affect and mood normal    Labs:     Most Recent 3 CBC's:  Recent Labs   Lab Test 08/16/20  1248 08/15/20  1437 08/22/19  1029   WBC 10.3 10.8 7.9   HGB 12.1 13.3 13.4   * 99 103*    252 214     Most Recent 3 BMP's:  Recent Labs   Lab Test 08/17/20  0901 08/16/20  2047 08/16/20  1248 08/15/20  1437     --  141 141   POTASSIUM 3.5 4.5 3.3* 4.1   CHLORIDE 109  --  111* 107   CO2 27  --  26 30   BUN 15  --  22 32*   CR 0.56  --  0.58 0.66   ANIONGAP 4  --  4 4   AMAN 8.2*  --  7.8* 9.2   *  --  99 118*     ASSESSMENT/PLAN:  Fall, subsequent encounter  Physical deconditioning  Acoustic neuroma (H)  Acute/ongoing: PT and OT for strengthening  H/o stereotactic radiation followed by Dr. Galindo     UTI: acute/ongiong:   Tx finished     Pulmonary nodule, right  Acute/incidental finding on CT  F/u as OP PCP        Hypothyroidism, unspecified type  Acute/ongoing: TFTs in 6 weeks  Continue synthroid 75mcg QD    Orders written by provider at facility  No new orders      Electronically signed by:  Tonya Lynn Haase, APRN CNP             Sincerely,        Tonya Lynn Haase, APRN CNP

## 2020-09-03 NOTE — PROGRESS NOTES
[place] [facility]  [place] Medical Record Number:  [id number]  Place of Service where encounter took place:  [facility] (S) [[id number]]  Chief Complaint   Patient presents with     Nursing Home Acute       HPI:    Teresa Bates  is a 88 year old (10/9/1931), who is being seen today for an episodic care visit.  HPI information obtained from: facility chart records, facility staff, patient report and [facility] chart review. Today's concern is:  Deconditioning: today patient walking with therapy >  300 feet using a RW with SBA, feels she is getting stronger  UTI: no signs of infection  Pulmonary nodule: denies SOB, cough, congestion      Past Medical and Surgical History reviewed in Epic today.    MEDICATIONS:    Current Outpatient Medications   Medication Sig Dispense Refill     ASPIRIN 81 MG PO TABS 1 TABLET DAILY       calcium-vitamin D (CALTRATE) 600-400 MG-UNIT per tablet Take 1 tablet by mouth daily (before lunch)        ibuprofen (ADVIL/MOTRIN) 200 MG tablet Take 200 mg by mouth every morning       levothyroxine (SYNTHROID/LEVOTHROID) 75 MCG tablet Take 1 tablet (75 mcg) by mouth daily 90 tablet 3     Multiple Vitamins-Minerals (CENTRUM SILVER 50+WOMEN PO) Take 1 tablet by mouth daily (before lunch)        polyethylene glycol (MIRALAX) 17 g packet Take 17 g by mouth daily as needed for constipation           REVIEW OF SYSTEMS:  10 point ROS of systems including Constitutional, Eyes, Respiratory, Cardiovascular, Gastroenterology, Genitourinary, Integumentary, Musculoskeletal, Psychiatric were all negative except for pertinent positives noted in my HPI.    Objective:  /77   Pulse 80   Temp 97.9  F (36.6  C)   Resp 16   SpO2 97%   Exam:  GENERAL APPEARANCE:  Alert, in no distress  ENT:  Mouth and posterior oropharynx normal, moist mucous membranes, [place]  EYES:  EOM, conjunctivae, lids, pupils and irises normal, PERRL  NECK:  .  RESP:  no respiratory  distress  CV:  peripheral edema 1+ in LE bilaterally  ABDOMEN:  abdomen round  M/S:   sitting up in WC, able to move all 4 extremities  SKIN:  Inspection of skin and subcutaneous tissue baseline  NEURO:   speech WNL  PSYCH:  affect and mood normal    Labs:     Most Recent 3 CBC's:  Recent Labs   Lab Test 08/16/20  1248 08/15/20  1437 08/22/19  1029   WBC 10.3 10.8 7.9   HGB 12.1 13.3 13.4   * 99 103*    252 214     Most Recent 3 BMP's:  Recent Labs   Lab Test 08/17/20  0901 08/16/20  2047 08/16/20  1248 08/15/20  1437     --  141 141   POTASSIUM 3.5 4.5 3.3* 4.1   CHLORIDE 109  --  111* 107   CO2 27  --  26 30   BUN 15  --  22 32*   CR 0.56  --  0.58 0.66   ANIONGAP 4  --  4 4   AMAN 8.2*  --  7.8* 9.2   *  --  99 118*     ASSESSMENT/PLAN:  Fall, subsequent encounter  Physical deconditioning  Acoustic neuroma (H)  Acute/ongoing: PT and OT for strengthening  H/o stereotactic radiation followed by Dr. Galindo     UTI: acute/ongiong:   Tx finished     Pulmonary nodule, right  Acute/incidental finding on CT  F/u as OP PCP        Hypothyroidism, unspecified type  Acute/ongoing: TFTs in 6 weeks  Continue synthroid 75mcg QD    Orders written by provider at facility  No new orders      Electronically signed by:  Tonya Lynn Haase, APRN CNP

## 2020-09-08 ASSESSMENT — MIFFLIN-ST. JEOR: SCORE: 939.36

## 2020-09-09 NOTE — PROGRESS NOTES
"[place] [facility]  [place] Medical Record Number:  [id number]  Place of Service where encounter took place:  [facility] ([facility]) [[id number]]  Chief Complaint   Patient presents with     Nursing Home Acute       HPI:    Teresa Bates  is a 88 year old (10/9/1931), who is being seen today for an episodic care visit.  HPI information obtained from: facility chart records, facility staff and patient report. Today's concern is:  Deconditioning: today patient walking with therapy >  300 feet using a RW with SBA, feels she is getting stronger, patient feels she is ready to return home  UTI: no signs of infection  Pulmonary nodule: denies SOB, cough, congestion       Past Medical and Surgical History reviewed in Epic today.    MEDICATIONS:    Current Outpatient Medications   Medication Sig Dispense Refill     ASPIRIN 81 MG PO TABS 1 TABLET DAILY       calcium-vitamin D (CALTRATE) 600-400 MG-UNIT per tablet Take 1 tablet by mouth daily (before lunch)        ibuprofen (ADVIL/MOTRIN) 200 MG tablet Take 200 mg by mouth every morning       levothyroxine (SYNTHROID/LEVOTHROID) 75 MCG tablet Take 1 tablet (75 mcg) by mouth daily 90 tablet 3     Multiple Vitamins-Minerals (CENTRUM SILVER 50+WOMEN PO) Take 1 tablet by mouth daily (before lunch)        polyethylene glycol (MIRALAX) 17 g packet Take 17 g by mouth daily as needed for constipation           REVIEW OF SYSTEMS:  10 point ROS of systems including Constitutional, Eyes, Respiratory, Cardiovascular, Gastroenterology, Genitourinary, Integumentary, Musculoskeletal, Psychiatric were all negative except for pertinent positives noted in my HPI.    Objective:  /65   Pulse 73   Temp 98.1  F (36.7  C)   Resp 17   Ht 1.6 m (5' 3\")   Wt 54 kg (119 lb 1.6 oz)   SpO2 98%   BMI 21.10 kg/m    Exam:  GENERAL APPEARANCE:  Alert, in no distress  ENT:  Mouth and posterior oropharynx normal, moist mucous membranes, [place]  EYES:  EOM, conjunctivae, lids, " pupils and irises normal, PERRL  NECK:  .  RESP:  no respiratory distress  CV:  peripheral edema 1+ in LE bilaterally  ABDOMEN:  abdomen round  M/S:   sitting up in WC, able to move all 4 extremities  SKIN:  Inspection of skin and subcutaneous tissue baseline  NEURO:   speech WNL  PSYCH:  affect and mood normal    Labs:     Most Recent 3 CBC's:  Recent Labs   Lab Test 08/16/20  1248 08/15/20  1437 08/22/19  1029   WBC 10.3 10.8 7.9   HGB 12.1 13.3 13.4   * 99 103*    252 214     Most Recent 3 BMP's:  Recent Labs   Lab Test 08/17/20  0901 08/16/20  2047 08/16/20  1248 08/15/20  1437     --  141 141   POTASSIUM 3.5 4.5 3.3* 4.1   CHLORIDE 109  --  111* 107   CO2 27  --  26 30   BUN 15  --  22 32*   CR 0.56  --  0.58 0.66   ANIONGAP 4  --  4 4   AMAN 8.2*  --  7.8* 9.2   *  --  99 118*       ASSESSMENT/PLAN:  Fall, subsequent encounter  Physical deconditioning  Acoustic neuroma (H)  Acute/ongoing: PT and OT for strengthening  H/o stereotactic radiation followed by Dr. Galindo     UTI: acute/ongiong:   Tx finished     Pulmonary nodule, right  Acute/incidental finding on CT  F/u as OP PCP        Hypothyroidism, unspecified type  Acute/ongoing: TSH and free T4 level on monday  Continue synthroid 75mcg QD    Orders written by provider at facility  TSH and free T4 on Monday      Electronically signed by:  Tonya Lynn Haase, APRN CNP

## 2020-09-10 ENCOUNTER — NURSING HOME VISIT (OUTPATIENT)
Dept: GERIATRICS | Facility: CLINIC | Age: 85
End: 2020-09-10
Payer: MEDICARE

## 2020-09-10 VITALS
DIASTOLIC BLOOD PRESSURE: 65 MMHG | HEART RATE: 73 BPM | TEMPERATURE: 98.1 F | BODY MASS INDEX: 21.1 KG/M2 | SYSTOLIC BLOOD PRESSURE: 117 MMHG | WEIGHT: 119.1 LBS | RESPIRATION RATE: 17 BRPM | HEIGHT: 63 IN | OXYGEN SATURATION: 98 %

## 2020-09-10 DIAGNOSIS — E03.9 HYPOTHYROIDISM, UNSPECIFIED TYPE: ICD-10-CM

## 2020-09-10 DIAGNOSIS — D33.3 ACOUSTIC NEUROMA (H): Primary | ICD-10-CM

## 2020-09-10 DIAGNOSIS — R53.81 PHYSICAL DECONDITIONING: ICD-10-CM

## 2020-09-10 DIAGNOSIS — N39.0 URINARY TRACT INFECTION WITHOUT HEMATURIA, SITE UNSPECIFIED: ICD-10-CM

## 2020-09-10 DIAGNOSIS — R91.1 PULMONARY NODULE, RIGHT: ICD-10-CM

## 2020-09-10 PROCEDURE — 99309 SBSQ NF CARE MODERATE MDM 30: CPT | Performed by: NURSE PRACTITIONER

## 2020-09-10 ASSESSMENT — MIFFLIN-ST. JEOR: SCORE: 939.82

## 2020-09-10 NOTE — LETTER
"    9/10/2020        RE: Teresa Bates  [address]  [address]  [facility] [address]        [place] [facility]  [place] Medical Record Number:  [id number]  Place of Service where encounter took place:  [facility] (S) [[id number]]  Chief Complaint   Patient presents with     Nursing Home Acute       HPI:    Teresa Bates  is a 88 year old (10/9/1931), who is being seen today for an episodic care visit.  HPI information obtained from: facility chart records, facility staff and patient report. Today's concern is:  Deconditioning: today patient walking with therapy >  300 feet using a RW with SBA, feels she is getting stronger, patient feels she is ready to return home  UTI: no signs of infection  Pulmonary nodule: denies SOB, cough, congestion       Past Medical and Surgical History reviewed in Epic today.    MEDICATIONS:    Current Outpatient Medications   Medication Sig Dispense Refill     ASPIRIN 81 MG PO TABS 1 TABLET DAILY       calcium-vitamin D (CALTRATE) 600-400 MG-UNIT per tablet Take 1 tablet by mouth daily (before lunch)        ibuprofen (ADVIL/MOTRIN) 200 MG tablet Take 200 mg by mouth every morning       levothyroxine (SYNTHROID/LEVOTHROID) 75 MCG tablet Take 1 tablet (75 mcg) by mouth daily 90 tablet 3     Multiple Vitamins-Minerals (CENTRUM SILVER 50+WOMEN PO) Take 1 tablet by mouth daily (before lunch)        polyethylene glycol (MIRALAX) 17 g packet Take 17 g by mouth daily as needed for constipation           REVIEW OF SYSTEMS:  10 point ROS of systems including Constitutional, Eyes, Respiratory, Cardiovascular, Gastroenterology, Genitourinary, Integumentary, Musculoskeletal, Psychiatric were all negative except for pertinent positives noted in my HPI.    Objective:  /65   Pulse 73   Temp 98.1  F (36.7  C)   Resp 17   Ht 1.6 m (5' 3\")   Wt 54 kg (119 lb 1.6 oz)   SpO2 98%   BMI 21.10 kg/m    Exam:  GENERAL APPEARANCE:  Alert, in no " distress  ENT:  Mouth and posterior oropharynx normal, moist mucous membranes, [place]  EYES:  EOM, conjunctivae, lids, pupils and irises normal, PERRL  NECK:  .  RESP:  no respiratory distress  CV:  peripheral edema 1+ in LE bilaterally  ABDOMEN:  abdomen round  M/S:   sitting up in WC, able to move all 4 extremities  SKIN:  Inspection of skin and subcutaneous tissue baseline  NEURO:   speech WNL  PSYCH:  affect and mood normal    Labs:     Most Recent 3 CBC's:  Recent Labs   Lab Test 08/16/20  1248 08/15/20  1437 08/22/19  1029   WBC 10.3 10.8 7.9   HGB 12.1 13.3 13.4   * 99 103*    252 214     Most Recent 3 BMP's:  Recent Labs   Lab Test 08/17/20  0901 08/16/20  2047 08/16/20  1248 08/15/20  1437     --  141 141   POTASSIUM 3.5 4.5 3.3* 4.1   CHLORIDE 109  --  111* 107   CO2 27  --  26 30   BUN 15  --  22 32*   CR 0.56  --  0.58 0.66   ANIONGAP 4  --  4 4   AMAN 8.2*  --  7.8* 9.2   *  --  99 118*       ASSESSMENT/PLAN:  Fall, subsequent encounter  Physical deconditioning  Acoustic neuroma (H)  Acute/ongoing: PT and OT for strengthening  H/o stereotactic radiation followed by Dr. Galindo     UTI: acute/ongiong:   Tx finished     Pulmonary nodule, right  Acute/incidental finding on CT  F/u as OP PCP        Hypothyroidism, unspecified type  Acute/ongoing: TSH and free T4 level on monday  Continue synthroid 75mcg QD    Orders written by provider at facility  TSH and free T4 on Monday      Electronically signed by:  Tonya Lynn Haase, APRN CNP             Sincerely,        Tonya Lynn Haase, APRN CNP

## 2020-09-15 ENCOUNTER — TRANSFERRED RECORDS (OUTPATIENT)
Dept: HEALTH INFORMATION MANAGEMENT | Facility: CLINIC | Age: 85
End: 2020-09-15

## 2020-09-15 ENCOUNTER — TELEPHONE (OUTPATIENT)
Dept: GERIATRICS | Facility: CLINIC | Age: 85
End: 2020-09-15

## 2020-09-15 ENCOUNTER — TELEPHONE (OUTPATIENT)
Dept: FAMILY MEDICINE | Facility: CLINIC | Age: 85
End: 2020-09-15

## 2020-09-15 ENCOUNTER — DISCHARGE SUMMARY NURSING HOME (OUTPATIENT)
Dept: GERIATRICS | Facility: CLINIC | Age: 85
End: 2020-09-15
Payer: MEDICARE

## 2020-09-15 VITALS
TEMPERATURE: 98.2 F | RESPIRATION RATE: 18 BRPM | WEIGHT: 119.2 LBS | HEIGHT: 63 IN | HEART RATE: 75 BPM | BODY MASS INDEX: 21.12 KG/M2 | SYSTOLIC BLOOD PRESSURE: 114 MMHG | DIASTOLIC BLOOD PRESSURE: 59 MMHG | OXYGEN SATURATION: 97 %

## 2020-09-15 DIAGNOSIS — R91.1 PULMONARY NODULE, RIGHT: ICD-10-CM

## 2020-09-15 DIAGNOSIS — R53.81 PHYSICAL DECONDITIONING: Primary | ICD-10-CM

## 2020-09-15 DIAGNOSIS — E03.9 HYPOTHYROIDISM, UNSPECIFIED TYPE: ICD-10-CM

## 2020-09-15 DIAGNOSIS — D33.3 ACOUSTIC NEUROMA (H): ICD-10-CM

## 2020-09-15 LAB — TSH SERPL-ACNC: 12.03 ULU/ML (ref 0.3–4.5)

## 2020-09-15 PROCEDURE — 99316 NF DSCHRG MGMT 30 MIN+: CPT | Performed by: NURSE PRACTITIONER

## 2020-09-15 NOTE — TELEPHONE ENCOUNTER
FYI: [facility] transferred this referral to [facility].     Any questions please contact the above homecare agency.       Thanks     Angle Carter RN BSN  [facility] : [facility]  Cell: [phone] *No need to call-I check EPIC Everyday*

## 2020-09-15 NOTE — LETTER
9/15/2020        RE: Teresa Bates  [address]  [address]  [place] [address]        [place] [facility] DISCHARGE SUMMARY  PATIENT'S NAME: Teresa Bates  YOB: 1931  MEDICAL RECORD NUMBER:  [id number]  Place of Service where encounter took place:  [facility] ([facility]) [[id number]]    PRIMARY CARE PROVIDER AND CLINIC RESPONSIBLE AFTER TRANSFER:   Shravan Bourgeois MD, [address] / [place] [address]    [facility] Provider     Transferring providers: Tonya Lynn Haase, APRN CNP, Dr. Chuckie Ewing MD  Recent Hospitalization/ED:  [facility] stay 8/15/20 to 8/17/20.  Date of SNF Admission: August / 17 / 2020  Date of SNF (anticipated) Discharge: September / 16 / 2020  Discharged to: previous independent home  Cognitive Scores: BIMS: 11/15 and SBT 0/28  Physical Function: Ambulating 520 ft with fww  DME: Walker    CODE STATUS/ADVANCE DIRECTIVES DISCUSSION:  No CPR Do NOT intubate   ALLERGIES: Patient has no known allergies.    DISCHARGE DIAGNOSIS/NURSING FACILITY COURSE:   Patient progressed to walking indep > 150 feet using a RW, indep with ADL's, very [place], hears better out of right ear. Patient will DC home with home PT, OT, RN and HHA through [facility].     Past Medical History:  has a past medical history of Acoustic neuroma (H) (8/24/2016), Hypothyroidism, unspecified type (8/24/2016), and Osteopenia (8/24/2016).    Discharge Medications:    Current Outpatient Medications   Medication Sig Dispense Refill     ASPIRIN 81 MG PO TABS 1 TABLET DAILY       calcium-vitamin D (CALTRATE) 600-400 MG-UNIT per tablet Take 1 tablet by mouth daily (before lunch)        ibuprofen (ADVIL/MOTRIN) 200 MG tablet Take 200 mg by mouth every morning       levothyroxine (SYNTHROID/LEVOTHROID) 75 MCG tablet Take 1 tablet (75 mcg) by mouth daily 90 tablet 3     Multiple Vitamins-Minerals (CENTRUM SILVER 50+WOMEN PO) Take 1 tablet by mouth daily (before lunch)     "    polyethylene glycol (MIRALAX) 17 g packet Take 17 g by mouth daily as needed for constipation         Medication Changes/Rationale:     There were no medication changes made    Controlled medications sent with patient:   not applicable/none     ROS:   10 point ROS of systems including Constitutional, Eyes, Respiratory, Cardiovascular, Gastroenterology, Genitourinary, Integumentary, Musculoskeletal, Psychiatric were all negative except for pertinent positives noted in my HPI.    Physical Exam:   Vitals: /59   Pulse 75   Temp 98.2  F (36.8  C)   Resp 18   Ht 1.6 m (5' 3\")   Wt 54.1 kg (119 lb 3.2 oz)   SpO2 97%   BMI 21.12 kg/m    BMI= Body mass index is 21.12 kg/m .  GENERAL APPEARANCE:  Alert, in no distress  ENT:  Mouth and posterior oropharynx normal, moist mucous membranes, [place]  EYES:  EOM, conjunctivae, lids, pupils and irises normal, PERRL  NECK:  .  RESP:  no respiratory distress  CV:  no edema  ABDOMEN:  abdomen flat  M/S:   patient sitting up in WC, able to move all 4 extremities  SKIN:  Inspection of skin and subcutaneous tissue baseline  NEURO:   speech wnl  PSYCH:  affect and mood normal     SNF labs:   Most Recent 3 CBC's:  Recent Labs   Lab Test 08/16/20  1248 08/15/20  1437 08/22/19  1029   WBC 10.3 10.8 7.9   HGB 12.1 13.3 13.4   * 99 103*    252 214     Most Recent 3 BMP's:  Recent Labs   Lab Test 08/17/20  0901 08/16/20  2047 08/16/20  1248 08/15/20  1437     --  141 141   POTASSIUM 3.5 4.5 3.3* 4.1   CHLORIDE 109  --  111* 107   CO2 27  --  26 30   BUN 15  --  22 32*   CR 0.56  --  0.58 0.66   ANIONGAP 4  --  4 4   AMAN 8.2*  --  7.8* 9.2   *  --  99 118*     ASSESSMENT/PLAN:  Fall, subsequent encounter  Physical deconditioning  Acoustic neuroma (H)  Acute/ongoing: DC home with home PT, OT, RN and HHA through [facility]  H/o stereotactic radiation followed by Dr. Galindo     Pulmonary nodule, right  Acute/incidental finding on CT  F/u as OP " PCP     Hypothyroidism, unspecified type  Acute/ongoing: TSH and free T4 level in AM  Continue synthroid 75mcg QD      DISCHARGE PLAN:    Follow up labs: No labs orders/due    Medical Follow Up:      Follow up with primary care provider in 1 weeks    MTM referral needed and placed by this provider: No    Current [facility] scheduled appointments:     Future Appointments   Date Time Provider Department Center   9/23/2020  2:30 PM Shravan Bourgeois MD [facility] CS         Discharge Services: Home Care:  Occupational Therapy, Physical Therapy, Registered Nurse, Home Health Aide and From:  [facility]    Discharge Instructions Verbalized to Patient at Discharge:     None      TOTAL DISCHARGE TIME:   Greater than 30 minutes  Electronically signed by:  Tonya Lynn Haase, APRN CNP                       Sincerely,        Tonya Lynn Haase, APRN CNP

## 2020-09-15 NOTE — TELEPHONE ENCOUNTER
ON-CALL  GERIATRICS CROSS-COVERAGE NOTE:    Call from RN to report the results of thyroid function test from today.  TSH is 12.03 and free T4 is 0.8.    Patient is on Synthroid 75 mg p.o. daily which needs to be increased. I will leave the final decision to her primary service.    Caimlo Rubio MD

## 2020-09-15 NOTE — PROGRESS NOTES
[place] [facility] DISCHARGE SUMMARY  PATIENT'S NAME: Teresa Bates  YOB: 1931  MEDICAL RECORD NUMBER:  [id number]  Place of Service where encounter took place:  [facility] ([facility]) [[id number]]    PRIMARY CARE PROVIDER AND CLINIC RESPONSIBLE AFTER TRANSFER:   Shravan Bourgeois MD, [address] / [place] [address]    [facility] Provider     Transferring providers: Tonya Lynn Haase, APRN CNP, Dr. Chuckie Ewing MD  Recent Hospitalization/ED:  [facility] stay 8/15/20 to 8/17/20.  Date of SNF Admission: August / 17 / 2020  Date of SNF (anticipated) Discharge: September / 16 / 2020  Discharged to: previous independent home  Cognitive Scores: BIMS: 11/15 and SBT 0/28  Physical Function: Ambulating 520 ft with fww  DME: Walker    CODE STATUS/ADVANCE DIRECTIVES DISCUSSION:  No CPR Do NOT intubate   ALLERGIES: Patient has no known allergies.    DISCHARGE DIAGNOSIS/NURSING FACILITY COURSE:   Patient progressed to walking indep > 150 feet using a RW, indep with ADL's, very [place], hears better out of right ear. Patient will DC home with home PT, OT, RN and HHA through [facility].     Past Medical History:  has a past medical history of Acoustic neuroma (H) (8/24/2016), Hypothyroidism, unspecified type (8/24/2016), and Osteopenia (8/24/2016).    Discharge Medications:    Current Outpatient Medications   Medication Sig Dispense Refill     ASPIRIN 81 MG PO TABS 1 TABLET DAILY       calcium-vitamin D (CALTRATE) 600-400 MG-UNIT per tablet Take 1 tablet by mouth daily (before lunch)        ibuprofen (ADVIL/MOTRIN) 200 MG tablet Take 200 mg by mouth every morning       levothyroxine (SYNTHROID/LEVOTHROID) 75 MCG tablet Take 1 tablet (75 mcg) by mouth daily 90 tablet 3     Multiple Vitamins-Minerals (CENTRUM SILVER 50+WOMEN PO) Take 1 tablet by mouth daily (before lunch)        polyethylene glycol (MIRALAX) 17 g packet Take 17 g by mouth daily as needed for constipation    "      Medication Changes/Rationale:     There were no medication changes made    Controlled medications sent with patient:   not applicable/none     ROS:   10 point ROS of systems including Constitutional, Eyes, Respiratory, Cardiovascular, Gastroenterology, Genitourinary, Integumentary, Musculoskeletal, Psychiatric were all negative except for pertinent positives noted in my HPI.    Physical Exam:   Vitals: /59   Pulse 75   Temp 98.2  F (36.8  C)   Resp 18   Ht 1.6 m (5' 3\")   Wt 54.1 kg (119 lb 3.2 oz)   SpO2 97%   BMI 21.12 kg/m    BMI= Body mass index is 21.12 kg/m .  GENERAL APPEARANCE:  Alert, in no distress  ENT:  Mouth and posterior oropharynx normal, moist mucous membranes, [place]  EYES:  EOM, conjunctivae, lids, pupils and irises normal, PERRL  NECK:  .  RESP:  no respiratory distress  CV:  no edema  ABDOMEN:  abdomen flat  M/S:   patient sitting up in WC, able to move all 4 extremities  SKIN:  Inspection of skin and subcutaneous tissue baseline  NEURO:   speech wnl  PSYCH:  affect and mood normal     SNF labs:   Most Recent 3 CBC's:  Recent Labs   Lab Test 08/16/20  1248 08/15/20  1437 08/22/19  1029   WBC 10.3 10.8 7.9   HGB 12.1 13.3 13.4   * 99 103*    252 214     Most Recent 3 BMP's:  Recent Labs   Lab Test 08/17/20  0901 08/16/20  2047 08/16/20  1248 08/15/20  1437     --  141 141   POTASSIUM 3.5 4.5 3.3* 4.1   CHLORIDE 109  --  111* 107   CO2 27  --  26 30   BUN 15  --  22 32*   CR 0.56  --  0.58 0.66   ANIONGAP 4  --  4 4   AMAN 8.2*  --  7.8* 9.2   *  --  99 118*     ASSESSMENT/PLAN:  Fall, subsequent encounter  Physical deconditioning  Acoustic neuroma (H)  Acute/ongoing: DC home with home PT, OT, RN and HHA through [facility]  H/o stereotactic radiation followed by Dr. Galindo     Pulmonary nodule, right  Acute/incidental finding on CT  F/u as OP PCP     Hypothyroidism, unspecified type  Acute/ongoing: TSH and free T4 level in AM  Continue synthroid 75mcg " QD      DISCHARGE PLAN:    Follow up labs: No labs orders/due    Medical Follow Up:      Follow up with primary care provider in 1 weeks    MTM referral needed and placed by this provider: No    Current [facility] scheduled appointments:     Future Appointments   Date Time Provider Department Center   9/23/2020  2:30 PM Shravan Bourgeois MD [facility] CS         Discharge Services: Home Care:  Occupational Therapy, Physical Therapy, Registered Nurse, Home Health Aide and From:  [facility]    Discharge Instructions Verbalized to Patient at Discharge:     None      TOTAL DISCHARGE TIME:   Greater than 30 minutes  Electronically signed by:  Tonya Lynn Haase, APRN CNP

## 2020-09-16 ENCOUNTER — DOCUMENTATION ONLY (OUTPATIENT)
Dept: CARE COORDINATION | Facility: CLINIC | Age: 85
End: 2020-09-16

## 2020-09-17 ENCOUNTER — TELEPHONE (OUTPATIENT)
Dept: FAMILY MEDICINE | Facility: CLINIC | Age: 85
End: 2020-09-17

## 2020-09-17 NOTE — TELEPHONE ENCOUNTER
Chief Complaint: Physical Deconditioning,  [facility] 16-SEP-2020  1 / 0    [phone] (home) [phone] (work)

## 2020-09-23 ENCOUNTER — VIRTUAL VISIT (OUTPATIENT)
Dept: FAMILY MEDICINE | Facility: CLINIC | Age: 85
End: 2020-09-23
Payer: MEDICARE

## 2020-09-23 DIAGNOSIS — T79.6XXD TRAUMATIC RHABDOMYOLYSIS, SUBSEQUENT ENCOUNTER: ICD-10-CM
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
## 2020-09-23 DIAGNOSIS — E03.9 HYPOTHYROIDISM, UNSPECIFIED TYPE: ICD-10-CM

## 2020-09-23 DIAGNOSIS — I35.0 AORTIC VALVE STENOSIS, ETIOLOGY OF CARDIAC VALVE DISEASE UNSPECIFIED: ICD-10-CM

## 2020-09-23 DIAGNOSIS — R91.8 PULMONARY NODULES: ICD-10-CM

## 2020-09-23 PROBLEM — I35.8 AORTIC VALVE SCLEROSIS: Status: RESOLVED | Noted: 2017-08-28 | Resolved: 2020-09-23

## 2020-09-23 PROCEDURE — 99442 ZZC PHYSICIAN TELEPHONE EVALUATION 11-20 MIN: CPT | Mod: 95 | Performed by: INTERNAL MEDICINE

## 2020-09-23 NOTE — PROGRESS NOTES
"Teresa Bates is a 88 year old female who is being evaluated via a billable telephone visit.      The patient has been notified of following:     \"This telephone visit will be conducted via a call between you and your physician/provider. We have found that certain health care needs can be provided without the need for a physical exam.  This service lets us provide the care you need with a short phone conversation.  If a prescription is necessary we can send it directly to your pharmacy.  If lab work is needed we can place an order for that and you can then stop by our lab to have the test done at a later time.    Telephone visits are billed at different rates depending on your insurance coverage. During this emergency period, for some insurers they may be billed the same as an in-person visit.  Please reach out to your insurance provider with any questions.    If during the course of the call the physician/provider feels a telephone visit is not appropriate, you will not be charged for this service.\"    Patient has given verbal consent for Telephone visit?  Yes    What phone number would you like to be contacted at? [phone]    How would you like to obtain your AVS? Mail a copy    Subjective     Teresa Bates is a 88 year old female who presents via phone visit today for the following health issues:    HPI      Hospital Follow-up Visit:    Hospital/Nursing Home/IP Rehab Facility: [facility]   Date of Admission: 8-  Date of Discharge: 9-  Reason(s) for Admission: Fall, subsequent encounter  Physical deconditioning  Acoustic neuroma (H)      Was your hospitalization related to COVID-19? No   Problems taking medications regularly:  None  Medication changes since discharge: None  Problems adhering to non-medication therapy:  None    Summary of hospitalization:  [facility] discharge summary reviewed  Diagnostic Tests/Treatments reviewed.  Follow up needed: lab " appointment mid November   Other Healthcare Providers Involved in Patient s Care:         None  Update since discharge: improved. Post Discharge Medication Reconciliation: discharge medications reconciled, continue medications without change.  Plan of care communicated with patient             Hospitalized for fall  She went to [facility]CU  She did not get levothyroxine according to her regular schedule while at TCU  She thinks this might be why her TSH was high  Her echo showed mild to moderate aortic stenosis   She is using a walker at all times now      Review of Systems   Constitutional, HEENT, cardiovascular, pulmonary, gi and gu systems are negative, except as otherwise noted.       Objective          Vitals:  No vitals were obtained today due to virtual visit.    healthy, alert and no distress  PSYCH: Alert and oriented times 3; coherent speech, normal   rate and volume, able to articulate logical thoughts, able   to abstract reason, no tangential thoughts, no hallucinations   or delusions  Her affect is normal  RESP: No cough, no audible wheezing, able to talk in full sentences  Remainder of exam unable to be completed due to telephone visits    TSH was 12 on 9/15        Assessment/Plan:    Assessment & Plan     Fall, subsequent encounter    - CARE COORDINATION REFERRAL    Traumatic rhabdomyolysis, subsequent encounter    - CARE COORDINATION REFERRAL    Hypothyroidism, unspecified type  Needs TFT recheck in 2 months; schedule follow up then  - CARE COORDINATION REFERRAL    Pulmonary nodules  Consider recheck in one year but nodule went from 9mm to 5mm from winter to late summer; discussed with patient     Aortic valve stenosis, etiology of cardiac valve disease unspecified  New finding noted; annual echocardiogram might be in order to make sure there is no progression              Return in about 2 months (around 11/23/2020) for recheck with thyroid labs (face to face please).   Patient instructed to return to  clinic or contact us sooner if symptoms worsen or new symptoms develop.   Shravan Bourgeois MD  [facility]    Phone call duration:  11 minutes

## 2020-09-25 ENCOUNTER — PATIENT OUTREACH (OUTPATIENT)
Dept: NURSING | Facility: CLINIC | Age: 85
End: 2020-09-25
Payer: MEDICARE

## 2020-09-25 SDOH — SOCIAL STABILITY: SOCIAL NETWORK: IN A TYPICAL WEEK, HOW MANY TIMES DO YOU TALK ON THE PHONE WITH FAMILY, FRIENDS, OR NEIGHBORS?: TWICE A WEEK

## 2020-09-25 SDOH — ECONOMIC STABILITY: TRANSPORTATION INSECURITY
IN THE PAST 12 MONTHS, HAS LACK OF TRANSPORTATION KEPT YOU FROM MEETINGS, WORK, OR FROM GETTING THINGS NEEDED FOR DAILY LIVING?: NO

## 2020-09-25 SDOH — ECONOMIC STABILITY: FOOD INSECURITY: WITHIN THE PAST 12 MONTHS, YOU WORRIED THAT YOUR FOOD WOULD RUN OUT BEFORE YOU GOT MONEY TO BUY MORE.: NEVER TRUE

## 2020-09-25 SDOH — SOCIAL STABILITY: SOCIAL NETWORK: HOW OFTEN DO YOU ATTEND CHURCH OR RELIGIOUS SERVICES?: NEVER

## 2020-09-25 SDOH — ECONOMIC STABILITY: FOOD INSECURITY: WITHIN THE PAST 12 MONTHS, THE FOOD YOU BOUGHT JUST DIDN'T LAST AND YOU DIDN'T HAVE MONEY TO GET MORE.: NEVER TRUE

## 2020-09-25 SDOH — ECONOMIC STABILITY: INCOME INSECURITY: HOW HARD IS IT FOR YOU TO PAY FOR THE VERY BASICS LIKE FOOD, HOUSING, MEDICAL CARE, AND HEATING?: NOT HARD AT ALL

## 2020-09-25 SDOH — HEALTH STABILITY: MENTAL HEALTH
STRESS IS WHEN SOMEONE FEELS TENSE, NERVOUS, ANXIOUS, OR CAN'T SLEEP AT NIGHT BECAUSE THEIR MIND IS TROUBLED. HOW STRESSED ARE YOU?: NOT AT ALL

## 2020-09-25 SDOH — SOCIAL STABILITY: SOCIAL NETWORK: HOW OFTEN DO YOU ATTENT MEETINGS OF THE CLUB OR ORGANIZATION YOU BELONG TO?: NEVER

## 2020-09-25 SDOH — ECONOMIC STABILITY: TRANSPORTATION INSECURITY
IN THE PAST 12 MONTHS, HAS THE LACK OF TRANSPORTATION KEPT YOU FROM MEDICAL APPOINTMENTS OR FROM GETTING MEDICATIONS?: NO

## 2020-09-25 SDOH — SOCIAL STABILITY: SOCIAL NETWORK: HOW OFTEN DO YOU GET TOGETHER WITH FRIENDS OR RELATIVES?: ONCE A WEEK

## 2020-09-25 SDOH — SOCIAL STABILITY: SOCIAL NETWORK
DO YOU BELONG TO ANY CLUBS OR ORGANIZATIONS SUCH AS CHURCH GROUPS UNIONS, FRATERNAL OR ATHLETIC GROUPS, OR SCHOOL GROUPS?: NO

## 2020-09-25 ASSESSMENT — ACTIVITIES OF DAILY LIVING (ADL): DEPENDENT_IADLS:: SHOPPING;TRANSPORTATION

## 2020-09-25 NOTE — LETTER
[place] CARE COORDINATION  [address] [place] [address]    September 25, 2020    Teresa Bates  [address]    [place] [address]      Dear Teresa,    I am a clinic care coordinator who works with Shravan Bourgeois MD at [facility]. I wanted to thank you for spending the time to talk with me.  Below is a description of clinic care coordination and how I can further assist you.      The clinic care coordination team is made up of a registered nurse,  and community health worker who understand the health care system. The goal of clinic care coordination is to help you manage your health and improve access to the health care system in the most efficient manner. The team can assist you in meeting your health care goals by providing education, coordinating services, strengthening the communication among your providers and supporting you with any resource needs.    Please feel free to contact me at [phone] with any questions or concerns. We are focused on providing you with the highest-quality healthcare experience possible and that all starts with you.     Sincerely,     RENNY Deshpande, [facility]  Clinic Care Coordinator  [facility]  [phone]  [email or web address]

## 2020-09-25 NOTE — PROGRESS NOTES
"Clinic Care Coordination Contact    Clinic Care Coordination Contact  OUTREACH    Referral Information:  Referral Source: IP Report    Primary Diagnosis: Psychosocial    Chief Complaint   Patient presents with     Clinic Care Coordination - Initial        Universal Utilization: Recently hospitalized due to a fall  Clinic Utilization  Difficulty keeping appointments:: No  Compliance Concerns: No  No-Show Concerns: No  No PCP office visit in Past Year: No  Utilization    Last refreshed: 9/24/2020 11:49 PM:  Hospital Admissions 1           Last refreshed: 9/24/2020 11:49 PM:  ED Visits 1           Last refreshed: 9/24/2020 11:49 PM:  No Show Count (past year) 0              Current as of: 9/24/2020 11:49 PM            Clinical Concerns:  CC FERMÍN spoke with pt regarding transportation. Discussed multiple options including: [employer], [employer], [employer], Lyft, and taxi. Pt is unable to complete [employer] Application \"because they want to much information\". Cost was discussed for [employer], which would likely be around $40 round trip, and pt felt this was too expensive. She inquired about Lyft but she said she or her children would not know how to use the kathy. Go [employer] Grandparent was discussed but pt felt using the phone system would be too complicated. It was explained that unfortunately there are no truly low cost options for transportation in [place].    CC FERMÍN inquired about any other friends or family that could provide help. She said not in [place], she has lots of friends in [place] though.    Pt plans to reschedule the appointment for a time that her son-in-law can provide her transportation. He lives 20 miles away. Pt shared the son-in-law brings her groceries.    Current Medical Concerns:  none    Current Behavioral Concerns: none    Education Provided to patient: CC role   Pain  Pain (GOAL):: No  Health Maintenance Reviewed: Up to date  Clinical Pathway: None    Medication Management:  Not " discussed at this time     Functional Status:  Dependent ADLs:: Independent  Dependent IADLs:: Shopping, Transportation  Bed or wheelchair confined:: No  Mobility Status: Independent w/Device  Fallen 2 or more times in the past year?: No  Any fall with injury in the past year?: No    Living Situation:  Current living arrangement:: I live in a nursing home  Type of residence:: Other(U)    Lifestyle & Psychosocial Needs:  Lifestyle     Physical activity     Days per week: Not on file     Minutes per session: Not on file     Stress: Not at all     Social Needs     Financial resource strain: Not hard at all     Food insecurity     Worry: Never true     Inability: Never true     Transportation needs     Medical: No     Non-medical: No     Diet:: Regular  Inadequate nutrition (GOAL):: No  Tube Feeding: No  Inadequate activity/exercise (GOAL):: No  Significant changes in sleep pattern (GOAL): No  Transportation means:: Family     Jew or spiritual beliefs that impact treatment:: No  Mental health DX:: No  Mental health management concern (GOAL):: No  Informal Support system:: Children, Family   Socioeconomic History     Marital status:      Spouse name: Not on file     Number of children: Not on file     Years of education: Not on file     Highest education level: Not on file   Relationships     Social connections     Talks on phone: Twice a week     Gets together: Once a week     Attends Buddhist service: Never     Active member of club or organization: No     Attends meetings of clubs or organizations: Never     Relationship status: Not on file     Intimate partner violence     Fear of current or ex partner: Not on file     Emotionally abused: Not on file     Physically abused: Not on file     Forced sexual activity: Not on file     Tobacco Use     Smoking status: Former Smoker     Packs/day: 1.00     Years: 30.00     Pack years: 30.00     Types: Cigarettes     Smokeless tobacco: Never Used   Substance and  Sexual Activity     Alcohol use: No     Alcohol/week: 0.0 standard drinks     Drug use: No     Sexual activity: Not Currently     Partners: Male        Resources and Interventions:  Current Resources:    Community Resources:  Supplies used at home:: None  Equipment Currently Used at Home: walker, rolling    Advance Care Plan/Directive  Advanced Care Plans/Directives on file:: No  Type Advanced Care Plans/Directives: DNR/DNI    Referrals Placed: None     Patient/Caregiver understanding: Pt reports understanding and denies any additional questions or concerns at this times. SW CC engaged in AIDET communication during encounter.       Future Appointments              In 1 month Shravan Bourgeois MD [facility] [address] [place]        Plan: At this time, pt denies outstanding need for connection or referral to resources or assistance navigating recommended follow up care. No further outreaches will be made at this time unless a new referral is made or a change in the pt's status occurs. Patient was provided with CC SW contact information and encouraged to call with any questions or concerns.    RENNY Deshpande, [facility]  Clinic Care Coordinator  [facility][note 3] [facility]  [phone]  [email or web address]

## 2020-11-09 ENCOUNTER — APPOINTMENT (OUTPATIENT)
Dept: GENERAL RADIOLOGY | Facility: CLINIC | Age: 85
DRG: 552 | End: 2020-11-09
Attending: EMERGENCY MEDICINE
Payer: MEDICARE

## 2020-11-09 ENCOUNTER — APPOINTMENT (OUTPATIENT)
Dept: CT IMAGING | Facility: CLINIC | Age: 85
DRG: 552 | End: 2020-11-09
Attending: EMERGENCY MEDICINE
Payer: MEDICARE

## 2020-11-09 ENCOUNTER — HOSPITAL ENCOUNTER (INPATIENT)
Facility: CLINIC | Age: 85
LOS: 4 days | Discharge: NURSING, REHAB OR LONG-TERM CARE | DRG: 552 | End: 2020-11-13
Attending: EMERGENCY MEDICINE | Admitting: INTERNAL MEDICINE
Payer: MEDICARE

## 2020-11-09 DIAGNOSIS — T79.6XXA TRAUMATIC RHABDOMYOLYSIS, INITIAL ENCOUNTER (H): ICD-10-CM

## 2020-11-09 DIAGNOSIS — S22.060A COMPRESSION FRACTURE OF T8 VERTEBRA, INITIAL ENCOUNTER (H): ICD-10-CM
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
## 2020-11-09 PROBLEM — S32.010A COMPRESSION FRACTURE OF L1 LUMBAR VERTEBRA (H): Status: ACTIVE | Noted: 2020-11-09

## 2020-11-09 LAB
ALBUMIN UR-MCNC: 30 MG/DL
ANION GAP SERPL CALCULATED.3IONS-SCNC: 3 MMOL/L (ref 3–14)
APPEARANCE UR: CLEAR
BASOPHILS # BLD AUTO: 0 10E9/L (ref 0–0.2)
BASOPHILS NFR BLD AUTO: 0.1 %
BILIRUB UR QL STRIP: NEGATIVE
BUN SERPL-MCNC: 23 MG/DL (ref 7–30)
CALCIUM SERPL-MCNC: 8.8 MG/DL (ref 8.5–10.1)
CHLORIDE SERPL-SCNC: 111 MMOL/L (ref 94–109)
CK SERPL-CCNC: 1284 U/L (ref 30–225)
CO2 SERPL-SCNC: 28 MMOL/L (ref 20–32)
COLOR UR AUTO: YELLOW
CREAT SERPL-MCNC: 0.6 MG/DL (ref 0.52–1.04)
DIFFERENTIAL METHOD BLD: ABNORMAL
EOSINOPHIL # BLD AUTO: 0 10E9/L (ref 0–0.7)
EOSINOPHIL NFR BLD AUTO: 0.1 %
ERYTHROCYTE [DISTWIDTH] IN BLOOD BY AUTOMATED COUNT: 13.6 % (ref 10–15)
GFR SERPL CREATININE-BSD FRML MDRD: 81 ML/MIN/{1.73_M2}
GLUCOSE SERPL-MCNC: 128 MG/DL (ref 70–99)
GLUCOSE UR STRIP-MCNC: NEGATIVE MG/DL
HCT VFR BLD AUTO: 40.1 % (ref 35–47)
HGB BLD-MCNC: 13.4 G/DL (ref 11.7–15.7)
HGB UR QL STRIP: ABNORMAL
IMM GRANULOCYTES # BLD: 0.1 10E9/L (ref 0–0.4)
IMM GRANULOCYTES NFR BLD: 0.4 %
KETONES UR STRIP-MCNC: >150 MG/DL
LEUKOCYTE ESTERASE UR QL STRIP: NEGATIVE
LYMPHOCYTES # BLD AUTO: 0.9 10E9/L (ref 0.8–5.3)
LYMPHOCYTES NFR BLD AUTO: 7.2 %
MCH RBC QN AUTO: 34 PG (ref 26.5–33)
MCHC RBC AUTO-ENTMCNC: 33.4 G/DL (ref 31.5–36.5)
MCV RBC AUTO: 102 FL (ref 78–100)
MONOCYTES # BLD AUTO: 0.8 10E9/L (ref 0–1.3)
MONOCYTES NFR BLD AUTO: 6.7 %
MUCOUS THREADS #/AREA URNS LPF: PRESENT /LPF
NEUTROPHILS # BLD AUTO: 10.4 10E9/L (ref 1.6–8.3)
NEUTROPHILS NFR BLD AUTO: 85.5 %
NITRATE UR QL: NEGATIVE
NRBC # BLD AUTO: 0 10*3/UL
NRBC BLD AUTO-RTO: 0 /100
PH UR STRIP: 5.5 PH (ref 5–7)
PLATELET # BLD AUTO: 153 10E9/L (ref 150–450)
POTASSIUM SERPL-SCNC: 3.7 MMOL/L (ref 3.4–5.3)
RBC # BLD AUTO: 3.94 10E12/L (ref 3.8–5.2)
RBC #/AREA URNS AUTO: 10 /HPF (ref 0–2)
SODIUM SERPL-SCNC: 142 MMOL/L (ref 133–144)
SOURCE: ABNORMAL
SP GR UR STRIP: 1.03 (ref 1–1.03)
UROBILINOGEN UR STRIP-MCNC: NORMAL MG/DL (ref 0–2)
WBC # BLD AUTO: 12.1 10E9/L (ref 4–11)
WBC #/AREA URNS AUTO: <1 /HPF (ref 0–5)

## 2020-11-09 PROCEDURE — 81001 URINALYSIS AUTO W/SCOPE: CPT | Performed by: EMERGENCY MEDICINE

## 2020-11-09 PROCEDURE — 120N000001 HC R&B MED SURG/OB

## 2020-11-09 PROCEDURE — 80048 BASIC METABOLIC PNL TOTAL CA: CPT | Performed by: EMERGENCY MEDICINE

## 2020-11-09 PROCEDURE — C9803 HOPD COVID-19 SPEC COLLECT: HCPCS

## 2020-11-09 PROCEDURE — 72072 X-RAY EXAM THORAC SPINE 3VWS: CPT

## 2020-11-09 PROCEDURE — 85025 COMPLETE CBC W/AUTO DIFF WBC: CPT | Performed by: EMERGENCY MEDICINE

## 2020-11-09 PROCEDURE — 96361 HYDRATE IV INFUSION ADD-ON: CPT

## 2020-11-09 PROCEDURE — 72100 X-RAY EXAM L-S SPINE 2/3 VWS: CPT

## 2020-11-09 PROCEDURE — U0003 INFECTIOUS AGENT DETECTION BY NUCLEIC ACID (DNA OR RNA); SEVERE ACUTE RESPIRATORY SYNDROME CORONAVIRUS 2 (SARS-COV-2) (CORONAVIRUS DISEASE [COVID-19]), AMPLIFIED PROBE TECHNIQUE, MAKING USE OF HIGH THROUGHPUT TECHNOLOGIES AS DESCRIBED BY CMS-2020-01-R: HCPCS | Performed by: EMERGENCY MEDICINE

## 2020-11-09 PROCEDURE — 99285 EMERGENCY DEPT VISIT HI MDM: CPT | Mod: 25

## 2020-11-09 PROCEDURE — 250N000011 HC RX IP 250 OP 636: Performed by: INTERNAL MEDICINE

## 2020-11-09 PROCEDURE — 82550 ASSAY OF CK (CPK): CPT | Performed by: EMERGENCY MEDICINE

## 2020-11-09 PROCEDURE — 258N000003 HC RX IP 258 OP 636: Performed by: EMERGENCY MEDICINE

## 2020-11-09 PROCEDURE — 99223 1ST HOSP IP/OBS HIGH 75: CPT | Mod: AI | Performed by: INTERNAL MEDICINE

## 2020-11-09 PROCEDURE — 96360 HYDRATION IV INFUSION INIT: CPT

## 2020-11-09 PROCEDURE — 72170 X-RAY EXAM OF PELVIS: CPT

## 2020-11-09 PROCEDURE — 70450 CT HEAD/BRAIN W/O DYE: CPT

## 2020-11-09 RX ORDER — SODIUM CHLORIDE 9 MG/ML
INJECTION, SOLUTION INTRAVENOUS CONTINUOUS
Status: DISCONTINUED | OUTPATIENT
Start: 2020-11-09 | End: 2020-11-10

## 2020-11-09 RX ORDER — ONDANSETRON 4 MG/1
4 TABLET, ORALLY DISINTEGRATING ORAL EVERY 6 HOURS PRN
Status: DISCONTINUED | OUTPATIENT
Start: 2020-11-09 | End: 2020-11-13 | Stop reason: HOSPADM

## 2020-11-09 RX ORDER — LIDOCAINE 40 MG/G
CREAM TOPICAL
Status: DISCONTINUED | OUTPATIENT
Start: 2020-11-09 | End: 2020-11-13 | Stop reason: HOSPADM

## 2020-11-09 RX ORDER — LEVOTHYROXINE SODIUM 75 UG/1
75 TABLET ORAL DAILY
Status: DISCONTINUED | OUTPATIENT
Start: 2020-11-10 | End: 2020-11-13 | Stop reason: HOSPADM

## 2020-11-09 RX ORDER — ACETAMINOPHEN 325 MG/1
650 TABLET ORAL EVERY 4 HOURS PRN
Status: DISCONTINUED | OUTPATIENT
Start: 2020-11-09 | End: 2020-11-11

## 2020-11-09 RX ORDER — AMOXICILLIN 250 MG
2 CAPSULE ORAL 2 TIMES DAILY
Status: DISCONTINUED | OUTPATIENT
Start: 2020-11-09 | End: 2020-11-13 | Stop reason: HOSPADM

## 2020-11-09 RX ORDER — IBUPROFEN 200 MG
200 TABLET ORAL EVERY MORNING
Status: DISCONTINUED | OUTPATIENT
Start: 2020-11-10 | End: 2020-11-13 | Stop reason: HOSPADM

## 2020-11-09 RX ORDER — ONDANSETRON 2 MG/ML
4 INJECTION INTRAMUSCULAR; INTRAVENOUS EVERY 6 HOURS PRN
Status: DISCONTINUED | OUTPATIENT
Start: 2020-11-09 | End: 2020-11-13 | Stop reason: HOSPADM

## 2020-11-09 RX ORDER — MORPHINE SULFATE 2 MG/ML
1 INJECTION, SOLUTION INTRAMUSCULAR; INTRAVENOUS
Status: DISCONTINUED | OUTPATIENT
Start: 2020-11-09 | End: 2020-11-13 | Stop reason: HOSPADM

## 2020-11-09 RX ORDER — POLYETHYLENE GLYCOL 3350 17 G/17G
17 POWDER, FOR SOLUTION ORAL DAILY
Status: DISCONTINUED | OUTPATIENT
Start: 2020-11-09 | End: 2020-11-13 | Stop reason: HOSPADM

## 2020-11-09 RX ORDER — POLYETHYLENE GLYCOL 3350 17 G/17G
17 POWDER, FOR SOLUTION ORAL DAILY PRN
Status: DISCONTINUED | OUTPATIENT
Start: 2020-11-09 | End: 2020-11-09

## 2020-11-09 RX ORDER — BISACODYL 10 MG
10 SUPPOSITORY, RECTAL RECTAL DAILY PRN
Status: DISCONTINUED | OUTPATIENT
Start: 2020-11-09 | End: 2020-11-13 | Stop reason: HOSPADM

## 2020-11-09 RX ORDER — HYDROCODONE BITARTRATE AND ACETAMINOPHEN 5; 325 MG/1; MG/1
1 TABLET ORAL EVERY 4 HOURS PRN
Status: DISCONTINUED | OUTPATIENT
Start: 2020-11-09 | End: 2020-11-11

## 2020-11-09 RX ORDER — BISACODYL 5 MG
10 TABLET, DELAYED RELEASE (ENTERIC COATED) ORAL DAILY PRN
Status: DISCONTINUED | OUTPATIENT
Start: 2020-11-09 | End: 2020-11-13 | Stop reason: HOSPADM

## 2020-11-09 RX ORDER — AMOXICILLIN 250 MG
1 CAPSULE ORAL 2 TIMES DAILY
Status: DISCONTINUED | OUTPATIENT
Start: 2020-11-09 | End: 2020-11-13 | Stop reason: HOSPADM

## 2020-11-09 RX ORDER — BISACODYL 5 MG
15 TABLET, DELAYED RELEASE (ENTERIC COATED) ORAL DAILY PRN
Status: DISCONTINUED | OUTPATIENT
Start: 2020-11-09 | End: 2020-11-13 | Stop reason: HOSPADM

## 2020-11-09 RX ORDER — POLYETHYLENE GLYCOL 3350 17 G/17G
17 POWDER, FOR SOLUTION ORAL DAILY PRN
Status: DISCONTINUED | OUTPATIENT
Start: 2020-11-09 | End: 2020-11-13 | Stop reason: HOSPADM

## 2020-11-09 RX ORDER — NALOXONE HYDROCHLORIDE 0.4 MG/ML
.1-.4 INJECTION, SOLUTION INTRAMUSCULAR; INTRAVENOUS; SUBCUTANEOUS
Status: DISCONTINUED | OUTPATIENT
Start: 2020-11-09 | End: 2020-11-13 | Stop reason: HOSPADM

## 2020-11-09 RX ORDER — BISACODYL 5 MG
5 TABLET, DELAYED RELEASE (ENTERIC COATED) ORAL DAILY PRN
Status: DISCONTINUED | OUTPATIENT
Start: 2020-11-09 | End: 2020-11-13 | Stop reason: HOSPADM

## 2020-11-09 RX ADMIN — SODIUM CHLORIDE: 9 INJECTION, SOLUTION INTRAVENOUS at 14:14

## 2020-11-09 RX ADMIN — ENOXAPARIN SODIUM 40 MG: 40 INJECTION SUBCUTANEOUS at 18:37

## 2020-11-09 ASSESSMENT — ENCOUNTER SYMPTOMS
VOMITING: 0
SHORTNESS OF BREATH: 0
FEVER: 0
SORE THROAT: 0
DIARRHEA: 0
NECK PAIN: 0
BACK PAIN: 1
COUGH: 0
ABDOMINAL PAIN: 0
HEADACHES: 0

## 2020-11-09 ASSESSMENT — ACTIVITIES OF DAILY LIVING (ADL): ADLS_ACUITY_SCORE: 23

## 2020-11-09 ASSESSMENT — MIFFLIN-ST. JEOR
SCORE: 934.13
SCORE: 933.91

## 2020-11-09 NOTE — ED NOTES
[facility]  ED Nurse Handoff Report    ED Chief complaint: Fall and Back Pain      ED Diagnosis:   Final diagnoses:   Traumatic rhabdomyolysis, initial encounter (H)   Compression fracture of T8 vertebra, initial encounter (H)       Code Status: To be addressed by admitting physician    Allergies: No Known Allergies    Patient Story: Lost her balance last night at 2100, fell on the ground, unable to get up found by family this am. generalized weakness     Focused Assessment:  Low transverse back pain, Generalized weakness.    Treatments and/or interventions provided:   Labs Ordered and Resulted from Time of ED Arrival Up to the Time of Departure from the ED   ROUTINE UA WITH MICROSCOPIC - Abnormal; Notable for the following components:       Result Value    Ketones Urine >150 (*)     Blood Urine Moderate (*)     Protein Albumin Urine 30 (*)     RBC Urine 10 (*)     Mucous Urine Present (*)     All other components within normal limits   CBC WITH PLATELETS DIFFERENTIAL - Abnormal; Notable for the following components:    WBC 12.1 (*)      (*)     MCH 34.0 (*)     Absolute Neutrophil 10.4 (*)     All other components within normal limits   BASIC METABOLIC PANEL - Abnormal; Notable for the following components:    Chloride 111 (*)     Glucose 128 (*)     All other components within normal limits   CK TOTAL - Abnormal; Notable for the following components:    CK Total 1,284 (*)     All other components within normal limits   COVID-19 VIRUS (CORONAVIRUS) BY PCR   PERIPHERAL IV CATHETER       XR Pelvis 1/2 Views   Preliminary Result   IMPRESSION: No acute fracture identified.      XR Thoracic Spine 3 Views   Final Result   IMPRESSION: There has been interval development of moderate   compression deformity of the T8 vertebral body. Acute fracture is not   excluded. Vertebral body heights of the thoracic spine otherwise   appear within normal limits. Alignment of the thoracic vertebrae is   normal.       TONYA CONNOR MD      Lumbar spine XR, 2-3 views   Final Result   IMPRESSION: Moderate chronic compression deformity of the L1 vertebral   body again noted. Vertebral body heights and contours of the lumbar   spine are otherwise normal. No new fractures. There is degenerative   grade 1 anterolisthesis of L4 upon L5. Alignment of the lumbar   vertebrae is otherwise normal. There is degenerative endplate spurring   at L2-L3 and L4-L5. There is facet arthropathy at the L3-L4, L4-L5 and   L5-S1 levels.      TONYA CONNOR MD      Head CT w/o contrast   Final Result   IMPRESSION:  Diffuse cerebral volume loss and cerebral white matter   changes consistent with chronic small vessel ischemic disease. No   evidence for acute intracranial pathology.         Radiation dose for this scan was reduced using automated exposure   control, adjustment of the mA and/or kV according to patient size, or   iterative reconstruction technique.      TONYA CONNOR MD          Patient's response to treatments and/or interventions: No adverse.    To be done/followed up on inpatient unit:  Inpatient orders    Does this patient have any cognitive concerns?: Alert, oriented, GCS 15    Activity level - Baseline/Home:  Walker  Activity Level - Current:   Total Care    Patient's Preferred language: English   Needed?: No    Isolation: None  Infection: Not Applicable  Patient tested for COVID 19 prior to admission: YES  Bariatric?: No    Vital Signs:   Vitals:    11/09/20 1330 11/09/20 1400 11/09/20 1500 11/09/20 1600   BP: 122/60 117/63 121/62 (!) 123/93   Pulse: 87 94 95 112   Resp:       Temp:       TempSrc:       SpO2: (!) 88%  92%    Weight:       Height:           Cardiac Rhythm:    Was the PSS-3 completed:   Yes  What interventions are required if any?  N/A  Family Comments: None to note  OBS brochure/video discussed/provided to patient/family: N/A              Name of person given brochure if not patient: N/A               Relationship to patient: N/A    For the majority of the shift this patient's behavior was Green.   Behavioral interventions performed were N/A.    ED NURSE PHONE NUMBER: TRACEY Bautista

## 2020-11-09 NOTE — ED TRIAGE NOTES
Lost her balance last night at 2100, fell on the ground, unable to get up found by family this am. generalized weakness

## 2020-11-09 NOTE — ED NOTES
Bed: ED28  Expected date:   Expected time:   Means of arrival:   Comments:  Miguelina - 89 F fall no covid eta 0938

## 2020-11-09 NOTE — PROGRESS NOTES
RECEIVING UNIT ED HANDOFF REVIEW    ED note is incomplete - acknowledging current note.    1742 - Acknowledging completed note.    ED Nurse Handoff Report was reviewed by: Laurita Gillespie RN on November 9, 2020 at 5:02 PM

## 2020-11-09 NOTE — H&P
[facility]    History and Physical - Hospitalist Service       Date of Admission:  11/9/2020    Assessment & Plan   Teresa Bates is a 89 year old female admitted on 11/9/2020. She presents after an unwitnessed fall at home.  She was likely on the floor for about 11 hours, between 9 PM yesterday at 8 AM today, before fall was discovered and she was brought to the emergency department for evaluation.  Laboratory studies are within normal limits with the exception of mild rhabdomyolysis.  Imaging studies show a probable acute compression fracture of T8, chronic compression fracture of L1, no pelvic or hip fractures, head CT with no acute changes.  She is admitted for further evaluation and treatment.    Principal Problem:    Fall    Admit as inpatient    Treat pain    PT, OT, social work consults requested    Patient was at [facility] TCU in September, likely needs TCU at discharge and ultimately consider long-term care due to frequent falls  Active Problems:    Rhabdomyolysis    CK is modestly elevated    Treat with IV fluids    Follow-up CK    Hypothyroidism, unspecified type    TSH was slightly elevated on 9/15/2020 (12.03)    Continue thyroid hormone and recheck TSH    Osteopenia    Continue calcium and vitamin D supplements    Compression fracture of T8 vertebra, initial encounter (H)    Compression fracture of L1 lumbar vertebra (H)    Treat pain    Early mobilization is key    Bowel regimen to prevent constipation        Diet:  Regular  DVT Prophylaxis: Enoxaparin (Lovenox) SQ  Saravia Catheter: not present  Code Status:  DNR/DNI         Disposition Plan   Expected discharge: Tomorrow, recommended to transitional care unit once safe disposition plan/ TCU bed available.  Entered: Angela Mortensen MD 11/09/2020, 4:03 PM     The patient's care was discussed with the Bedside Nurse, Patient and ED MD.    Angela Mortensen MD  [facility]  Contact information  "available via [facility] Paging/Directory      ______________________________________________________________________    Chief Complaint   \"I turned 89 and it has been a rough year.\"    History is obtained from the patient    History of Present Illness   Teresa Bates is a 89 year old female with multiple medical problems including acoustic neuroma, osteopenia, frequent falls, weakness deconditioning, who presents the emergency department after being found down on the floor following an unwitnessed fall.    Teresa says this is her third fall this year.  She was hospitalized once in [place] and once here at [facility].  She says her son-in-law visits her twice a day, at 8 AM and 8 PM.  She says he had just left last evening and she was \"doing some fun things\" in her kitchen and tried to go to the living room, using her walker.  She is quite careful to say that she was not dizzy but she felt lightheaded and she fell backwards in the corridor between her kitchen in her living room.  She braced herself with her left arm and fell on her bottom.      She lay on the kitchen floor all night before her son in law found her at 8 AM.  He brought her to the emergency department for evaluation where laboratory studies are unremarkable except for elevated CK level.  Imaging studies include CT of the thoracic spine showing acute compression fracture of T8, CT of lumbar spine showing chronic compression fracture of L1, x-rays of the pelvis and hip showing no fracture.  She is a she had a head CT showing volume loss and chronic small vessel ischemic change.      When discussing her frequent falls, Teresa says she has been told she needs to get a Lif[employer] alert necklace to summon help after falling.  She knows that  moving to a long-term care facility may be recommended but says she has a, \"lifetime of treasures\" in her home that she needs to sort through.  She is admitted for further evaluation and treatment.    Review of Systems    The " 10 point Review of Systems is negative other than noted in the HPI or here.     Past Medical History    I have reviewed this patient's medical history and updated it with pertinent information if needed.   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  Past Surgical History:   Procedure Laterality Date     Stereotactic radiation to acustic neuroma on left with Dr. Galindo Left 05/10/200       Social History   I have reviewed this patient's social history and updated it with pertinent information if needed.  Social History     Tobacco Use     Smoking status: Former Smoker     Packs/day: 1.00     Years: 30.00     Pack years: 30.00     Types: Cigarettes     Smokeless tobacco: Never Used   Substance Use Topics     Alcohol use: No     Alcohol/week: 0.0 standard drinks     Drug use: No       Family History   I have reviewed this patient's family history and updated it with pertinent information if needed.  Family History   Problem Relation Age of Onset     Family History Negative Mother      Myocardial Infarction Father      Multiple Sclerosis Daughter      Osteoporosis Daughter    Teresa says that her mother lived to age 109-1/2.    Prior to Admission Medications   Prior to Admission Medications   Prescriptions Last Dose Informant Patient Reported? Taking?   ASPIRIN 81 MG PO TABS unknown Son Yes Yes   Sig: Take 81 mg by mouth daily    Multiple Vitamins-Minerals (CENTRUM SILVER 50+WOMEN PO) unknown Son Yes Yes   Sig: Take 1 tablet by mouth daily (before lunch)    calcium-vitamin D (CALTRATE) 600-400 MG-UNIT per tablet unknown Son Yes Yes   Sig: Take 1 tablet by mouth daily (before lunch)    ibuprofen (ADVIL/MOTRIN) 200 MG tablet unknown Son Yes Yes   Sig: Take 200 mg by mouth every morning    levothyroxine (SYNTHROID/LEVOTHROID) 75 MCG tablet unknown Son No Yes   Sig: Take 1 tablet (75 mcg) by mouth daily   polyethylene glycol (MIRALAX) 17 g packet  at prn Son No Yes   Sig: Take 17 g by mouth daily as needed for constipation      Facility-Administered Medications: None     Allergies   No Known Allergies    Physical Exam   Vital Signs: Temp: 97.5  F (36.4  C) Temp src: Oral BP: 121/62 Pulse: 95   Resp: 16 SpO2: 92 % O2 Device: Nasal cannula Oxygen Delivery: 2 LPM  Weight: 119 lbs 0 oz    Constitutional: Awake, alert, cooperative, no apparent distress.  She is very hard of hearing but is able to hear examiner when using a pocket talker.  Eyes: Conjunctiva and pupils examined and normal.  HEENT: Moist mucous membranes, normal dentition.  Respiratory: Clear to auscultation bilaterally, no crackles or wheezing.  Cardiovascular: Regular rate and rhythm  GI: Soft, non-distended, non-tender, normal bowel sounds.  Lymph/Hematologic: No anterior cervical or supraclavicular adenopathy.  Skin: She has an abrasion on the left elbow.  There are some superficial ecchymoses on both hands, and she has the occasional actinic keratosis, seborrheic keratosis  Musculoskeletal: No joint swelling, erythema or tenderness.  Neurologic: Moves both arms and both legs, exam is nonfocal  Psychiatric: Alert, oriented to person, place and time, no obvious anxiety or depression.  Mood is very pleasant and remarks are appropriate.      Data   Data reviewed today: I reviewed all medications, new labs and imaging results over the last 24 hours. I personally reviewed the Thoracic spine and lumbar spine CT image(s) showing Acute compression fracture of T8, chronic compression fracture of L1.  Also reviewed x-ray of the pelvis and which shows no fracture, CT of the head which shows volume loss and chronic vessel ischemic change    Recent Labs   Lab 11/09/20  1116   WBC 12.1*   HGB 13.4   *         POTASSIUM 3.7    CHLORIDE 111*   CO2 28   BUN 23   CR 0.60   ANIONGAP 3   AMAN 8.8   *     Recent Results (from the past 24 hour(s))   Head CT w/o contrast    Narrative    CT OF THE HEAD WITHOUT CONTRAST 11/9/2020 11:34 AM     COMPARISON: Head CT 8/15/2020    HISTORY: Fall.    TECHNIQUE: 5 mm thick axial CT images of the head were acquired  without IV contrast material.    FINDINGS:  There is moderate diffuse cerebral volume loss. There are  extensive confluent areas of decreased density in the cerebral white  matter bilaterally that are consistent with sequela of chronic small  vessel ischemic disease.     The ventricles and basal cisterns are within normal limits in  configuration given the degree of cerebral volume loss.  There is no  midline shift. There are no extra-axial fluid collections.     No intracranial hemorrhage, mass or recent infarct.    The visualized paranasal sinuses are well-aerated. There is no  mastoiditis. There are no fractures of the visualized bones.       Impression    IMPRESSION:  Diffuse cerebral volume loss and cerebral white matter  changes consistent with chronic small vessel ischemic disease. No  evidence for acute intracranial pathology.      Radiation dose for this scan was reduced using automated exposure  control, adjustment of the mA and/or kV according to patient size, or  iterative reconstruction technique.    TONYA CONNOR MD   Lumbar spine XR, 2-3 views    Narrative    LUMBAR SPINE TWO TO THREE VIEWS 11/9/2020 11:49 AM     COMPARISON: Chest CT 8/15/2020    HISTORY: Fall.      Impression    IMPRESSION: Moderate chronic compression deformity of the L1 vertebral  body again noted. Vertebral body heights and contours of the lumbar  spine are otherwise normal. No new fractures. There is degenerative  grade 1 anterolisthesis of L4 upon L5. Alignment of the lumbar  vertebrae is otherwise normal. There is degenerative endplate spurring  at L2-L3 and L4-L5. There is facet arthropathy at the  L3-L4, L4-L5 and  L5-S1 levels.    TONYA CONNOR MD   XR Thoracic Spine 3 Views    Narrative    THORACIC SPINE THREE VIEWS  11/9/2020 11:59 AM     COMPARISON: Chest CT 8/15/2020    HISTORY: Fall.      Impression    IMPRESSION: There has been interval development of moderate  compression deformity of the T8 vertebral body. Acute fracture is not  excluded. Vertebral body heights of the thoracic spine otherwise  appear within normal limits. Alignment of the thoracic vertebrae is  normal.    TONYA CONNOR MD   XR Pelvis 1/2 Views    Narrative    PELVIS ONE TO TWO VIEWS  11/9/2020 11:59 AM     HISTORY:  Fall.    FINDINGS: Lower lumbar spine degenerative change. Osteopenia. Austyn-pin  fixation of an old proximal left femur fracture.      Impression    IMPRESSION: No acute fracture identified.

## 2020-11-09 NOTE — PHARMACY-ADMISSION MEDICATION HISTORY
Pharmacy Medication History  Admission medication history interview status for the 11/9/2020  admission is complete. See EPIC admission navigator for prior to admission medications       Medication history sources: Patient's family/friend (Son), epic notes, [facility]  Location of interview: phone interview  Medication history source reliability: Good  Adherence assessment: Good    Significant changes made to the medication list:  none      Additional medication history information:   none    Medication reconciliation completed by provider prior to medication history? No    Time spent in this activity: 15 min      Prior to Admission medications    Medication Sig Last Dose Taking? Auth Provider   ASPIRIN 81 MG PO TABS Take 81 mg by mouth daily  unknown Yes Reported, Patient   calcium-vitamin D (CALTRATE) 600-400 MG-UNIT per tablet Take 1 tablet by mouth daily (before lunch)  unknown Yes Reported, Patient   ibuprofen (ADVIL/MOTRIN) 200 MG tablet Take 200 mg by mouth every morning unknown Yes Unknown, Entered By History   levothyroxine (SYNTHROID/LEVOTHROID) 75 MCG tablet Take 1 tablet (75 mcg) by mouth daily unknown Yes Shravan Bourgeois MD   Multiple Vitamins-Minerals (CENTRUM SILVER 50+WOMEN PO) Take 1 tablet by mouth daily (before lunch)  unknown Yes Reported, Patient   polyethylene glycol (MIRALAX) 17 g packet Take 17 g by mouth daily as needed for constipation  at prn Yes Zack Taylor MD

## 2020-11-09 NOTE — ED PROVIDER NOTES
History   Chief Complaint:  Fall and Back Pain     The history is provided by a relative and the patient.      Teersa Bates is a 89 year old female with history of weakness, deconditioning, frequent falls, acoustic neuroma, rhabdomyolysis, and osteopenia who presents with unwitnessed fall and low back pain. The family member states he calls the patient every day at 8a and 8p as she lives alone and they want to make sure that she is okay. They called the patient at 8p last night and she was well. Family thinks she fell last night around 9 last night as this morning she did not answer her call at 8a and they found her on the floor. The patient reports a mechanical fall. She notes low back pain that started after the fall. She denies hitting her head or loss of consciousness. She has no neck pain, rib pain, or arm pain. She denies cough, fever, sore throat, shortness of breath, rib pain, abdominal pain, vomiting, diarrhea, or any other concerns.  EMS notes oxygen saturation has been 91-92%.  Patient is very hard of hearing so listening device was used for the history and physical.    Allergies:  No Known Allergies    Medications:   Aspirin 81 mg   Levothyroxine  Miralax     Past Medical History:    acoustic neuroma   hypothyroidism   Osteopenia   Pulmonary nodules   Aortic valve stenosis    rhabdomyolysis     Past Surgical History:    Stereotactic radiation to acoustic neuroma      Family History:    MS  Osteoporosis     Social History:  The patient was accompanied to the ED by son in law.  Smoking Status: former smoker for 30 pack years  Smokeless Tobacco: Never Used  Alcohol Use: No  Drug Use: No  PCP: Shravan Bourgeois     Review of Systems   Constitutional: Negative for fever.   HENT: Negative for sore throat.    Respiratory: Negative for cough and shortness of breath.    Gastrointestinal: Negative for abdominal pain, diarrhea and vomiting.   Musculoskeletal: Positive for back pain. Negative for neck pain.  "       No rib or arm pain.   Neurological: Negative for syncope and headaches.   All other systems reviewed and are negative.    Physical Exam     Patient Vitals for the past 24 hrs:   BP Temp Temp src Pulse Resp SpO2 Height Weight   11/09/20 1600 (!) 123/93 -- -- 112 -- -- -- --   11/09/20 1500 121/62 -- -- 95 -- 92 % -- --   11/09/20 1400 117/63 -- -- 94 -- -- -- --   11/09/20 1330 122/60 -- -- 87 -- (!) 88 % -- --   11/09/20 1315 133/65 -- -- 91 -- 91 % -- --   11/09/20 1115 137/63 -- -- 100 16 90 % -- --   11/09/20 1100 -- -- -- -- -- 92 % -- --   11/09/20 1045 -- -- -- -- -- 90 % -- --   11/09/20 1030 -- -- -- -- -- 91 % -- --   11/09/20 1000 -- -- -- -- -- 91 % -- --   11/09/20 0954 124/71 97.5  F (36.4  C) Oral 92 18 92 % 1.6 m (5' 3\") 54 kg (119 lb)       Physical Exam  Constitutional:  Patient is oriented to person. They appear frail and elderly. Mild distress secondary to back pain   HENT:   Mouth/Throat:   Head is atraumatic oropharynx is clear and moist.   Eyes:    Conjunctivae normal and EOM are normal. Pupils are equal, round, and reactive to light.   Neck:    Normal range of motion. No tenderness to palpation  Cardiovascular: Normal rate, regular rhythm and normal heart sounds.  Exam reveals no gallop and no friction rub.  No murmur heard.  Pulmonary/Chest:  Effort normal and breath sounds normal. Patient has no wheezes. Patient has no rales.   Abdominal:   Soft. Bowel sounds are normal. Patient exhibits no mass. There is no tenderness. There is no rebound and no guarding.   Musculoskeletal:  Mid to low upper back pain with no bony step offs or bruising.   Neurological:   Patient is alert and oriented to person, place, and time. Generally weak. No cranial nerve deficit or sensory deficit. GCS 15  Skin:   Skin is warm and dry. No rash noted. No erythema.   Psychiatric:   normal mood.     Emergency Department Course     Imaging:  Radiology findings were communicated with the patient and son in law who " voiced understanding of the findings.    XR Pelvis 1/2 Views  IMPRESSION: No acute fracture identified.  Reading per radiology     XR Thoracic Spine 3 Views  IMPRESSION: There has been interval development of moderate  compression deformity of the T8 vertebral body. Acute fracture is not  excluded. Vertebral body heights of the thoracic spine otherwise  appear within normal limits. Alignment of the thoracic vertebrae is  normal.  Reading per radiology    Lumbar spine XR, 2-3 views  IMPRESSION: Moderate chronic compression deformity of the L1 vertebral  body again noted. Vertebral body heights and contours of the lumbar  spine are otherwise normal. No new fractures. There is degenerative  grade 1 anterolisthesis of L4 upon L5. Alignment of the lumbar  vertebrae is otherwise normal. There is degenerative endplate spurring  at L2-L3 and L4-L5. There is facet arthropathy at the L3-L4, L4-L5 and  L5-S1 levels.  Reading per radiology     Head CT w/o contrast  IMPRESSION:  Diffuse cerebral volume loss and cerebral white matter  changes consistent with chronic small vessel ischemic disease. No  evidence for acute intracranial pathology.    Radiation dose for this scan was reduced using automated exposure  control, adjustment of the mA and/or kV according to patient size, or  iterative reconstruction technique.  Reading per radiology     Laboratory:  Laboratory findings were communicated with the patient  who voiced understanding of the findings.    CBC: WBC 12.1(H), HGB 13.4,   BMP: chloride 111, glucose 128 o/w WNL (Creatinine 0.60)  CK total: 1284    UA with microscopic: urineketon >150, blood moderate, protein albumin 30, RBC 10, mucous present o/w WNL     COVID19 Virus PCR by nasopharyngeal swab pending     Interventions:  1414 NS IV     Emergency Department Course:  Nursing notes and vitals reviewed.    1005 I performed an exam of the patient as documented above.     1348 I rechecked the patient. Explained  findings to the Patient.    1545 I spoke with the patient's sister on the phone regarding the patient's presentation and plan of care.     1551 I spoke with Dr. Mortensen of the Hospitalist service from [facility] regarding patient's presentation, findings, and plan of care.     Findings and plan explained to the Patient who consents to admission. Discussed the patient with Dr. Mortensen, who will admit the patient for further monitoring, evaluation, and treatment.     Impression & Plan    Covid-19  Teresa Bates was evaluated during a global COVID-19 pandemic, which necessitated consideration that the patient might be at risk for infection with the SARS-CoV-2 virus that causes COVID-19.   Applicable protocols for evaluation were followed during the patient's care.   COVID-19 was considered as part of the patient's evaluation. The plan for testing is:  a test was obtained during this visit.      Medical Decision Making:  Teresa Bates is a 89 year old female who presents after an unwitnessed fall, we think last night. She is a frequent keturah and lives by herself. She likely fell about 12 hours ago based on family's last conversation with her. She did not complain of any head or neck pain but did have back pain and left sided pelvic pain. Xray were performed and showed a T8 compression fracture that was not there when she previously fell and was evaluated in the emergency room. This is likely acute. Additionally, she appears to have some traumatic rhabdomyolysis. She is getting IV Fluids for this. At this time, I will admit her to the hospital. She is receiving maintenance fluid. White count was slightly elevated. Urinalysis is otherwise negative as is CT head. She was admitted to the care of Dr. Mortensen.     Diagnosis:    ICD-10-CM    1. Traumatic rhabdomyolysis, initial encounter (H)  T79.6XXA Asymptomatic COVID-19 Virus (Coronavirus) by PCR   2. Compression fracture of T8 vertebra, initial  encounter (H)  S22.060A        Disposition:   The patient is admitted into the care of Dr. Mortensen.    Scribe Disclosure:  I, Lesia Irving, am serving as a scribe at 10:26 AM on 11/9/2020 to document services personally performed by Claudia Martin MD based on my observations and the provider's statements to me.   [facility] EMERGENCY DEPARTMENT       Claudia Martin MD  11/09/20 1640

## 2020-11-10 ENCOUNTER — APPOINTMENT (OUTPATIENT)
Dept: OCCUPATIONAL THERAPY | Facility: CLINIC | Age: 85
DRG: 552 | End: 2020-11-10
Attending: INTERNAL MEDICINE
Payer: MEDICARE

## 2020-11-10 ENCOUNTER — APPOINTMENT (OUTPATIENT)
Dept: PHYSICAL THERAPY | Facility: CLINIC | Age: 85
DRG: 552 | End: 2020-11-10
Attending: INTERNAL MEDICINE
Payer: MEDICARE

## 2020-11-10 LAB
CK SERPL-CCNC: 1365 U/L (ref 30–225)
CREAT SERPL-MCNC: 0.49 MG/DL (ref 0.52–1.04)
GFR SERPL CREATININE-BSD FRML MDRD: 86 ML/MIN/{1.73_M2}
SARS-COV-2 RNA SPEC QL NAA+PROBE: NOT DETECTED
SPECIMEN SOURCE: NORMAL
T4 FREE SERPL-MCNC: 1.09 NG/DL (ref 0.76–1.46)
TSH SERPL DL<=0.005 MIU/L-ACNC: 7.79 MU/L (ref 0.4–4)

## 2020-11-10 PROCEDURE — 97161 PT EVAL LOW COMPLEX 20 MIN: CPT | Mod: GP

## 2020-11-10 PROCEDURE — 97530 THERAPEUTIC ACTIVITIES: CPT | Mod: GO

## 2020-11-10 PROCEDURE — 84439 ASSAY OF FREE THYROXINE: CPT | Performed by: INTERNAL MEDICINE

## 2020-11-10 PROCEDURE — 97165 OT EVAL LOW COMPLEX 30 MIN: CPT | Mod: GO

## 2020-11-10 PROCEDURE — 258N000003 HC RX IP 258 OP 636: Performed by: EMERGENCY MEDICINE

## 2020-11-10 PROCEDURE — 82565 ASSAY OF CREATININE: CPT | Performed by: INTERNAL MEDICINE

## 2020-11-10 PROCEDURE — 97535 SELF CARE MNGMENT TRAINING: CPT | Mod: GO

## 2020-11-10 PROCEDURE — 250N000013 HC RX MED GY IP 250 OP 250 PS 637: Performed by: INTERNAL MEDICINE

## 2020-11-10 PROCEDURE — G0463 HOSPITAL OUTPT CLINIC VISIT: HCPCS

## 2020-11-10 PROCEDURE — 82550 ASSAY OF CK (CPK): CPT | Performed by: INTERNAL MEDICINE

## 2020-11-10 PROCEDURE — 84443 ASSAY THYROID STIM HORMONE: CPT | Performed by: INTERNAL MEDICINE

## 2020-11-10 PROCEDURE — 258N000003 HC RX IP 258 OP 636: Performed by: INTERNAL MEDICINE

## 2020-11-10 PROCEDURE — 99232 SBSQ HOSP IP/OBS MODERATE 35: CPT | Performed by: INTERNAL MEDICINE

## 2020-11-10 PROCEDURE — 97530 THERAPEUTIC ACTIVITIES: CPT | Mod: GP

## 2020-11-10 PROCEDURE — 120N000001 HC R&B MED SURG/OB

## 2020-11-10 PROCEDURE — 250N000011 HC RX IP 250 OP 636: Performed by: INTERNAL MEDICINE

## 2020-11-10 PROCEDURE — 36415 COLL VENOUS BLD VENIPUNCTURE: CPT | Performed by: INTERNAL MEDICINE

## 2020-11-10 RX ORDER — SODIUM CHLORIDE 9 MG/ML
INJECTION, SOLUTION INTRAVENOUS CONTINUOUS
Status: ACTIVE | OUTPATIENT
Start: 2020-11-10 | End: 2020-11-11

## 2020-11-10 RX ADMIN — ACETAMINOPHEN 650 MG: 325 TABLET, FILM COATED ORAL at 19:36

## 2020-11-10 RX ADMIN — ENOXAPARIN SODIUM 40 MG: 40 INJECTION SUBCUTANEOUS at 19:34

## 2020-11-10 RX ADMIN — CALCIUM CARBONATE 600 MG (1,500 MG)-VITAMIN D3 400 UNIT TABLET 1 TABLET: at 11:51

## 2020-11-10 RX ADMIN — DOCUSATE SODIUM 50 MG AND SENNOSIDES 8.6 MG 1 TABLET: 8.6; 5 TABLET, FILM COATED ORAL at 08:49

## 2020-11-10 RX ADMIN — SODIUM CHLORIDE: 9 INJECTION, SOLUTION INTRAVENOUS at 14:48

## 2020-11-10 RX ADMIN — LEVOTHYROXINE SODIUM 75 MCG: 75 TABLET ORAL at 06:43

## 2020-11-10 RX ADMIN — IBUPROFEN 200 MG: 200 TABLET, FILM COATED ORAL at 08:49

## 2020-11-10 ASSESSMENT — ACTIVITIES OF DAILY LIVING (ADL)
ADLS_ACUITY_SCORE: 18
ADLS_ACUITY_SCORE: 23
ADLS_ACUITY_SCORE: 23
ADLS_ACUITY_SCORE: 18
ADLS_ACUITY_SCORE: 22
ADLS_ACUITY_SCORE: 18

## 2020-11-10 NOTE — PROGRESS NOTES
[facility]    Medicine Progress Note - Hospitalist Service       Date of Admission:  11/9/2020  Assessment & Plan       Teresa Bates is a 89 year old female admitted on 11/9/2020. She presents after an unwitnessed fall at home.  She was likely on the floor for about 11 hours, between 9 PM yesterday at 8 AM today, before fall was discovered and she was brought to the emergency department for evaluation.  Laboratory studies are within normal limits with the exception of mild rhabdomyolysis.  Imaging studies show a probable acute compression fracture of T8, chronic compression fracture of L1, no pelvic or hip fractures, head CT with no acute changes. She is admitted for further evaluation and treatment.       Mechanical Fall  Hx of falls  Deconditioning  CT head negative for acute intracranial abnormality. Thoracic spine xray compression deformity of the T8 vertebral body. Acute fracture is not excluded. Lumbar xray chronic compression deformity of the L1 vertebral  body again noted. Fall appears to be mechanical. Patient reports she was walking backwards in the kitchen to turn her walker to get to the den when she lost her balance and fell.   - PT/OT eval recommend TCU  - social work consult  - management of rhabdo as below    Rhabdomyolysis  Secondary to fall and being on floor for a prolonged period of time. Renal function good.   - CK 1284--1365  - continue with IVF at this time  - follow CK and renal function    Hypothyroidism, unspecified type  TSH was slightly elevated on 9/15/2020 (12.03). Currently 7.79, FT4 1.09  - continue supplement without change  - recommend f/u TSH in 6 weeks in outpatient setting    Osteopenia  - Continue calcium and vitamin D supplements      Compression fracture of T8 vertebra  Compression fracture of L1 lumbar vertebra, chronic  - PT/OT eval  - pain control  - Bowel regimen to prevent constipation       Diet: Combination Diet Regular Diet Adult     DVT Prophylaxis: Enoxaparin (Lovenox) SQ  [place]avia Catheter: not present  Code Status: No CPR- Do NOT Intubate           Disposition Plan   Expected discharge: 1-2 days, recommended to transitional care unit once stable renal function, downtrending CK..  Entered: Mariel Knapp MD 11/10/2020, 1:54 PM       The patient's care was discussed with the Bedside Nurse and Patient.    Mariel Knapp MD  Hospitalist Service  [facility]  Contact information available via [facility] Paging/Directory    ______________________________________________________________________    Interval History   Complains of some back pain. Denies nausea or vomiting. Denies abdominal pain.   Afebrile.   Therapy recommend TCU discharge. Patient understands that she is weak to go back to living by herself at this moment.    Data reviewed today: I reviewed all medications, new labs and imaging results over the last 24 hours. I personally reviewed the thoracic/lumbar spine xray image(s) showing as mentioned above. .    Physical Exam   Vital Signs: Temp: 98.6  F (37  C) Temp src: Axillary BP: 111/53 Pulse: 76   Resp: 18 SpO2: 91 % O2 Device: Nasal cannula Oxygen Delivery: 2 LPM  Weight: 119 lbs .77 oz  General Appearance: Alert, awake and no apparent distress  Respiratory: clear to auscultation bilaterally, no wheezing  Cardiovascular: regular rate and rhythm, +LE edema bilaterarlly  GI: soft and non-tender  Skin: warm and dry      Data   Recent Labs   Lab 11/10/20  1229 11/09/20  1116   WBC  --  12.1*   HGB  --  13.4   MCV  --  102*   PLT  --  153   NA  --  142   POTASSIUM  --  3.7   CHLORIDE  --  111*   CO2  --  28   BUN  --  23   CR 0.49* 0.60   ANIONGAP  --  3   AMAN  --  8.8   GLC  --  128*     No results found for this or any previous visit (from the past 24 hour(s)).  Medications     sodium chloride 75 mL/hr at 11/10/20 1448       calcium carbonate 600 mg-vitamin D 400 units  1 tablet Oral Daily before lunch      enoxaparin ANTICOAGULANT  40 mg Subcutaneous Q24H     ibuprofen  200 mg Oral QAM     levothyroxine  75 mcg Oral Daily     polyethylene glycol  17 g Oral Daily     senna-docusate  1 tablet Oral BID    Or     senna-docusate  2 tablet Oral BID     sodium chloride (PF)  3 mL Intracatheter Q8H

## 2020-11-10 NOTE — PLAN OF CARE
Pt very tired, arouse to voice. Very hard of hearing. Orientated. Multiple skin tears covered with Mepilex. Skin is warm to touch. Edema in legs improving, continue to elevate on pillows. Incontinent, purewick placed at 2200. Large BM this evening when transferred up from ED. Red marks improving on buttocks from bedpan, continue to turn and repo q2 hours. Plan pending. Likely discharge to TCU then to find new permanent place for patient due to frequent falls at home.

## 2020-11-10 NOTE — PLAN OF CARE
"DATE & TIME: 11/9/2020 3978-2682   Cognitive Concerns/ Orientation : A&Ox4; forgetful; very [place]   BEHAVIOR & AGGRESSION TOOL COLOR: Green  CIWA SCORE: NA   ABNL VS/O2: HR tachy in low 100's otherwise VSS; O2sats 90's 2L per NC  MOBILITY: Turned and Repositioned with assist of 2  PAIN MANAGMENT: Denies - \"I'm fine when I'm sitting up.\"  DIET: Regular  BOWEL/BLADDER: Incontinent  ABNL LAB/BG: CK 1284; WBC 12.1  DRAIN/DEVICES: PIV infusing  TELEMETRY RHYTHM: NA  SKIN: Skin tears/ abrasions noted on bilateral elbows and left shoulder/scapula; dry skin; BLE pink with 2+ edema; buttocks and upper thighs with blanchable redness from the bedpan (upon transfer from ED) - improving when rechecked at 1930  TESTS/PROCEDURES: None  D/C DAY/GOALS/PLACE: Possibly tomorrow to TCU pending placement  OTHER IMPORTANT INFO: PT/OT/SW/CC to see.      "

## 2020-11-10 NOTE — PLAN OF CARE
"Admission    Patient arrives to room 615-1 via cart from ED.    Care plan note: Patient is A&Ox4; forgetful but easily-redirected; very [place].  HR tachy in low 100's; otherwise VSS; O2sats 90's RA.  Patient denies pain/N/V/SOB - states \"I'm fine when I'm sitting up.\"  Skin tears noted on left shoulder/scapula, left and right elbows - mepilex applied.      Inpatient nursing criteria listed below were met:    PCD's Documented: NA  Skin issues/needs documented :Yes  Isolation education started/completed NA  Patient allergies verified with patient: Yes  Verified completion of [employer] Risk Assessment Tool:  Yes  Verified completion of Guardianship screening tool: Yes  Fall Prevention: Care plan updated, Education given and documented Yes  Care Plan initiated: Yes  Home medications documented in belongings flowsheet: NA  Patient belongings documented in belongings flowsheet: Yes  Reminder note (belongings/ medications) placed in discharge instructions:Yes  Admission profile/ required documentation complete: No - patient came at change of shift  Bedside Report Letter given and explained to patient Yes  Visitor Designated? Yes  If patient is a 72 hour hold/Commitment are belongings removed from room and locked up? NA    "

## 2020-11-10 NOTE — PROGRESS NOTES
11/10/20 1500   Quick Adds   Type of Visit Initial PT Evaluation   Living Environment   People in home alone   Current Living Arrangements condominium   Transportation Anticipated family or friend will provide   Living Environment Comments Pt lives alone in a condo with no stairs to manage   Self-Care   Usual Activity Tolerance fair   Current Activity Tolerance poor   Equipment Currently Used at Home walker, rolling   Activity/Exercise/Self-Care Comment Reproted being ind with ADL's   Disability/Function   Hearing Difficulty or Deaf yes   Wear Glasses or Blind no   Concentrating, Remembering or Making Decisions Difficulty no   Difficulty Communicating no   Difficulty Eating/Swallowing no   Walking or Climbing Stairs Difficulty yes   Walking or Climbing Stairs ambulation difficulty, requires equipment   Dressing/Bathing Difficulty no   Toileting no   Doing Errands Independently Difficulty (such as shopping) yes   Fall history within last six months yes   Number of times patient has fallen within last six months 1   Change in Functional Status Since Onset of Current Illness/Injury yes   General Information   Onset of Illness/Injury or Date of Surgery 11/09/20   Referring Physician Angela Mortensen MD   Patient/Family Therapy Goals Statement (PT) Get stronger, prevent falls   Pertinent History of Current Problem (include personal factors and/or comorbidities that impact the POC) Pt is a 89 yr old female admitted with unwittnessed fall at home. Thoracic spine xray compression deformity of the T8 vertebral body. Acute fracture is not excluded. Lumbar xray chronic compression deformity of the L1 vertebral   Existing Precautions/Restrictions fall;spinal   Weight-Bearing Status - LLE full weight-bearing   Weight-Bearing Status - RLE full weight-bearing   General Observations Activity: Up with assist.    Cognition   Orientation Status (Cognition) oriented x 3   Affect/Mental Status (Cognition) WFL   Follows Commands  (Cognition) follows one-step commands   Safety Deficit (Cognition) minimal deficit;problem-solving   Pain Assessment   Patient Currently in Pain Yes, see Vital Sign flowsheet  (L shoulder )   Integumentary/Edema   Integumentary/Edema Comments BLE edema noted at 3+   Range of Motion (ROM)   ROM Comment BLE: WFL   Strength Comprehensive (MMT)   Comment, General Manual Muscle Testing (MMT) Assessment BLE; grossly 3+/5   Bed Mobility   Comment (Bed Mobility) Supine<>sit: min assist x 2   Transfers   Transfer Safety Comments STS at min assist x 2   Gait/Stairs (Locomotion)   [place] Level (Gait) verbal cues;minimum assist (75% patient effort)   Assistive Device (Gait) walker, front-wheeled   Distance in Feet (Required for LE Total Joints) 2 ft   Pattern (Gait) 3-point;step-to   Deviations/Abnormal Patterns (Gait) base of support, narrow;cliff decreased;gait speed decreased   Balance   Balance Comments Sitting: good   Sensory Examination   Sensory Perception WFL   Coordination   Coordination no deficits were identified   Muscle Tone   Muscle Tone no deficits were identified   Clinical Impression   Criteria for Skilled Therapeutic Intervention yes, treatment indicated   PT Diagnosis (PT) Impaired gait   Influenced by the following impairments decreased strength and activity toelrance   Functional limitations due to impairments assistance needed with mobility   Clinical Presentation Stable/Uncomplicated   Clinical Presentation Rationale clinical judgement   Clinical Decision Making (Complexity) low complexity   Therapy Frequency (PT) 5x/week   Predicted Duration of Therapy Intervention (days/wks) 5   Planned Therapy Interventions (PT) balance training;bed mobility training;gait training;patient/family education;ROM (range of motion);stair training;strengthening;transfer training;wheelchair management/propulsion training   Risk & Benefits of therapy have been explained evaluation/treatment results reviewed;care  plan/treatment goals reviewed;risks/benefits reviewed;current/potential barriers reviewed;participants voiced agreement with care plan;patient   Clinical Impression Comments Pt presents with decreased strength and activity toelrance due to pain limiitng fucntional mobility. Pt will benefit from continued skilled PT to achieve PLOF.   PT Discharge Planning    PT Discharge Recommendation (DC Rec) Transitional Care Facility   PT Rationale for DC Rec Pt currently is below usual fucntional baseline with impaired strength, balance and activity toelrance. Pt will benefit from continued therapy at a TCU to achieve PLOF   PT Brief overview of current status  Min assist x 2 with bed mob, trasnfers and gait using RW.   Total Evaluation Time   Total Evaluation Time (Minutes) 10

## 2020-11-10 NOTE — PLAN OF CARE
Summary: Fall. Rhabdomyolysis  DATE & TIME: 11/10/20 DAY  Cognitive Concerns/ Orientation : A&O x4, forgetful. Very [place].   BEHAVIOR & AGGRESSION TOOL COLOR: Green  CIWA SCORE: na   ABNL VS/O2: VSS on 2 L oxygen via NC. Unable to wean due to pt complaining of SOB at times.   MOBILITY: Ax2, turn/repo Q 2 hrs.  PAIN MANAGMENT: L shoulder sore- heat applied.   DIET: Regular- pt requests no straws. Meds in applesauce.   BOWEL/BLADDER: Incontinent of urine. Purewick in place/ changed. Bm during shift.   ABNL LAB/BG: CK 1,365  DRAIN/DEVICES: PIV R arm NS @ 75  TELEMETRY RHYTHM: na  SKIN: Skin tears. Redness due to bed pan marks on bottom from ED- improving. 2+edema bilat LE.   TESTS/PROCEDURES: NA  D/C DAY/GOALS/PLACE: Discharge pending; likely to TCU due to falls and may need Senior living/[facility] due to frequent falls at home  OTHER IMPORTANT INFO: OT/PT/SW following      02-Feb-2019 23:48

## 2020-11-10 NOTE — PLAN OF CARE
Summary: Fall. Rhabdomyolysis  DATE & TIME: 11/10/20 3462-6373  Cognitive Concerns/ Orientation : A&O x4, forgetful.  BEHAVIOR & AGGRESSION TOOL COLOR: Green  CIWA SCORE: na   ABNL VS/O2: VSS on 2 L oxygen via nasal cannula.  MOBILITY: Ax2, turn/repo Q 2 hrs.  PAIN MANAGMENT: Denies  DIET: Regular assist with feeding; no straws per pt.  BOWEL/BLADDER: Incontinent. Purewick in place/ changed.  No BM this shift.  ABNL LAB/BG: CK elevated.  DRAIN/DEVICES: PIV R arm saline locked.   TELEMETRY RHYTHM: na  SKIN: Skin tears. Red bed pan marks on bottom from ED, improving.  TESTS/PROCEDURES: na  D/C DAY/GOALS/PLACE: Discharge pending; likely to TCU due to falls and may need Senior living/[facility] due to frequent falls at home  OTHER IMPORTANT INFO: Hard of Hearing.

## 2020-11-10 NOTE — UTILIZATION REVIEW
Admission Status; Secondary Review Determination       Under the authority of the Utilization Management Committee, the utilization review process indicated a secondary review on the above patient. The review outcome is based on review of the medical records, discussions with staff, and applying clinical experience noted on the date of the review.     (x) Inpatient Status Appropriate - This patient's medical care is consistent with medical management for inpatient care and reasonable inpatient medical practice.     RATIONALE FOR DETERMINATION   89 year old female admitted on 11/9/2020. She presents after an unwitnessed fall at home.  She was likely on the floor for about 11 hours, between 9 PM yesterday at 8 AM today, before fall was discovered and she was brought to the emergency department for evaluation.  Laboratory studies are within normal limits with the exception of mild rhabdomyolysis.  Imaging studies show a probable acute compression fracture of T8, chronic compression fracture of L1.   This is a significant trauma in an elderly presenting with compression fracture, rhabdomyolysis, requiring IV hydration to prevent acute kidney injury. This is a conditional review for a Medicare patient, at the time of this review patient is anticipated to require at least 1 more night of hospital care; however if plan changes and discharges before 2 midnights please send for post discharge review.    This document was produced using voice recognition software       The information on this document is developed by the utilization review team in order for the business office to ensure compliance. This only denotes the appropriateness of proper admission status and does not reflect the quality of care rendered.   The definitions of Inpatient Status and Observation Status used in making the determination above are those provided in the CMS Coverage Manual, Chapter 1 and Chapter 6, section 70.4.   Sincerely,   DK RETANA  MD GILLIAN   System Medical Director   Utilization Management   [facility].

## 2020-11-10 NOTE — PROGRESS NOTES
11/10/20 1400   Quick Adds   Type of Visit Initial Occupational Therapy Evaluation   Living Environment   People in home alone   Current Living Arrangements condominium   Transportation Anticipated family or friend will provide   Living Environment Comments Pt lives alone in a condo w/shower and shower chair (no grab bars); raised toilet seat   Self-Care   Usual Activity Tolerance fair   Current Activity Tolerance poor   Equipment Currently Used at Home walker, rolling   Activity/Exercise/Self-Care Comment Pt reports being indep in ADLs at baseline; gets A from daughter and son-in-law w/shopping and cleaning . Pt warms up frozen meals indep.    Disability/Function   Hearing Difficulty or Deaf yes   Use of hearing assistive devices right hearing aid   Wear Glasses or Blind no   Concentrating, Remembering or Making Decisions Difficulty no   Difficulty Communicating no   Difficulty Eating/Swallowing no   Dressing/Bathing Difficulty no   Toileting no   Doing Errands Independently Difficulty (such as shopping) yes   Errands Management A from daughter and son-in-law   Fall history within last six months yes   Number of times patient has fallen within last six months 1   Change in Functional Status Since Onset of Current Illness/Injury yes   General Information   Onset of Illness/Injury or Date of Surgery 11/09/20   Referring Physician Angela Mortensen MD   Additional Occupational Profile Info/Pertinent History of Current Problem 89 year old female admitted on 11/9/2020. She presents after an unwitnessed fall at home.  She was likely on the floor for about 11 hours, between 9 PM yesterday at 8 AM today, before fall was discovered and she was brought to the emergency department for evaluation.  Laboratory studies are within normal limits with the exception of mild rhabdomyolysis.  Imaging studies show a probable acute compression fracture of T8, chronic compression fracture of L1.    Existing  Precautions/Restrictions fall;spinal   Limitations/Impairments safety/cognitive   Cognitive Status Examination   Orientation Status orientation to person, place and time   Cognitive Status Comments oriented to month, year, , but not to date or day of week   Visual Perception   Visual Impairment/Limitations WFL   Sensory   Sensory Quick Adds Pain   Sensory Comments Pain in back and L. shoulder   Pain Assessment   Patient Currently in Pain Yes, see Vital Sign flowsheet   Range of Motion Comprehensive   General Range of Motion upper extremity range of motion deficits identified   Comment, General Range of Motion L. shoulder flex. dec; R. shoulder flexion and BUE internal/external rotation WFL   Strength Comprehensive (MMT)   General Manual Muscle Testing (MMT) Assessment upper extremity strength deficits identified   Comment, General Manual Muscle Testing (MMT) Assessment BUE sh. flexion 3+/5; grasp WFL   Bed Mobility   Bed Mobility supine-sit;sit-supine   Supine-Sit [place] (Bed Mobility) 2 person assist   Sit-Supine [place] (Bed Mobility) 2 person assist   Activities of Daily Living   BADL Assessment/Intervention upper body dressing;lower body dressing;grooming;toileting   Upper Body Dressing Assessment/Training   [place] Level (Upper Body Dressing) moderate assist (50% patient effort)   Lower Body Dressing Assessment/Training   [place] Level (Lower Body Dressing) maximum assist (25% patient effort)   Grooming Assessment/Training   [place] Level (Grooming) moderate assist (50% patient effort)   Position (Grooming) supported sitting   Toileting   [place] Level (Toileting) assist of 2;maximum assist (25% patient effort)   Clinical Impression   Criteria for Skilled Therapeutic Interventions Met (OT) yes   OT Diagnosis Dec indep in all ADLS   OT Problem List-Impairments impacting ADL balance;activity tolerance impaired;coordination;pain;strength   Assessment of Occupational Performance  "1-3 Performance Deficits   Identified Performance Deficits Dec indep in bathing, toileting, dressing, g/h, oral-self cares, and functional mobility   Planned Therapy Interventions (OT) ADL retraining;progressive activity/exercise   Clinical Decision Making Complexity (OT) low complexity   Therapy Frequency (OT) 5x/week   Predicted Duration of Therapy 7 days   Risks and Benefits of Treatment have been explained. Yes   Patient, Family & other staff in agreement with plan of care Yes   OT Discharge Planning    OT Discharge Recommendation (DC Rec) Transitional Care Facility   OT Rationale for DC Rec Pt is well below baseline and requires A x 2 for functional transfers and mobility needed for ADLs and IADLs. Pt lives alone and would not be safe to d/c home alone 2\" decreased activity tolerance, balance, and safety. Pt would benefit from cont. therapy to increase indep. and safety in ADLs    Total Evaluation Time (Minutes)   Total Evaluation Time (Minutes) 8     "

## 2020-11-11 ENCOUNTER — APPOINTMENT (OUTPATIENT)
Dept: MRI IMAGING | Facility: CLINIC | Age: 85
DRG: 552 | End: 2020-11-11
Attending: NURSE PRACTITIONER
Payer: MEDICARE

## 2020-11-11 ENCOUNTER — APPOINTMENT (OUTPATIENT)
Dept: PHYSICAL THERAPY | Facility: CLINIC | Age: 85
DRG: 552 | End: 2020-11-11
Payer: MEDICARE

## 2020-11-11 LAB
ANION GAP SERPL CALCULATED.3IONS-SCNC: 3 MMOL/L (ref 3–14)
BASOPHILS # BLD AUTO: 0 10E9/L (ref 0–0.2)
BASOPHILS NFR BLD AUTO: 0.4 %
BUN SERPL-MCNC: 19 MG/DL (ref 7–30)
CALCIUM SERPL-MCNC: 7.8 MG/DL (ref 8.5–10.1)
CHLORIDE SERPL-SCNC: 108 MMOL/L (ref 94–109)
CK SERPL-CCNC: 845 U/L (ref 30–225)
CO2 SERPL-SCNC: 28 MMOL/L (ref 20–32)
CREAT SERPL-MCNC: 0.59 MG/DL (ref 0.52–1.04)
DIFFERENTIAL METHOD BLD: ABNORMAL
EOSINOPHIL # BLD AUTO: 0.5 10E9/L (ref 0–0.7)
EOSINOPHIL NFR BLD AUTO: 4.6 %
ERYTHROCYTE [DISTWIDTH] IN BLOOD BY AUTOMATED COUNT: 14 % (ref 10–15)
GFR SERPL CREATININE-BSD FRML MDRD: 81 ML/MIN/{1.73_M2}
GLUCOSE SERPL-MCNC: 86 MG/DL (ref 70–99)
HCT VFR BLD AUTO: 37.8 % (ref 35–47)
HGB BLD-MCNC: 12.4 G/DL (ref 11.7–15.7)
IMM GRANULOCYTES # BLD: 0 10E9/L (ref 0–0.4)
IMM GRANULOCYTES NFR BLD: 0.3 %
LYMPHOCYTES # BLD AUTO: 1.5 10E9/L (ref 0.8–5.3)
LYMPHOCYTES NFR BLD AUTO: 14.7 %
MCH RBC QN AUTO: 33.3 PG (ref 26.5–33)
MCHC RBC AUTO-ENTMCNC: 32.8 G/DL (ref 31.5–36.5)
MCV RBC AUTO: 102 FL (ref 78–100)
MONOCYTES # BLD AUTO: 0.8 10E9/L (ref 0–1.3)
MONOCYTES NFR BLD AUTO: 7.8 %
NEUTROPHILS # BLD AUTO: 7.5 10E9/L (ref 1.6–8.3)
NEUTROPHILS NFR BLD AUTO: 72.2 %
NRBC # BLD AUTO: 0 10*3/UL
NRBC BLD AUTO-RTO: 0 /100
PLATELET # BLD AUTO: 150 10E9/L (ref 150–450)
POTASSIUM SERPL-SCNC: 3.2 MMOL/L (ref 3.4–5.3)
POTASSIUM SERPL-SCNC: 3.5 MMOL/L (ref 3.4–5.3)
RBC # BLD AUTO: 3.72 10E12/L (ref 3.8–5.2)
SODIUM SERPL-SCNC: 139 MMOL/L (ref 133–144)
WBC # BLD AUTO: 10.4 10E9/L (ref 4–11)

## 2020-11-11 PROCEDURE — 99233 SBSQ HOSP IP/OBS HIGH 50: CPT | Performed by: INTERNAL MEDICINE

## 2020-11-11 PROCEDURE — 250N000013 HC RX MED GY IP 250 OP 250 PS 637: Performed by: INTERNAL MEDICINE

## 2020-11-11 PROCEDURE — 80048 BASIC METABOLIC PNL TOTAL CA: CPT | Performed by: INTERNAL MEDICINE

## 2020-11-11 PROCEDURE — 82550 ASSAY OF CK (CPK): CPT | Performed by: INTERNAL MEDICINE

## 2020-11-11 PROCEDURE — 250N000011 HC RX IP 250 OP 636: Performed by: INTERNAL MEDICINE

## 2020-11-11 PROCEDURE — L0456 TLSO FLEX TRNK SJ-SS PRE CST: HCPCS

## 2020-11-11 PROCEDURE — 99222 1ST HOSP IP/OBS MODERATE 55: CPT | Performed by: NURSE PRACTITIONER

## 2020-11-11 PROCEDURE — 36415 COLL VENOUS BLD VENIPUNCTURE: CPT | Performed by: INTERNAL MEDICINE

## 2020-11-11 PROCEDURE — 72146 MRI CHEST SPINE W/O DYE: CPT

## 2020-11-11 PROCEDURE — 85025 COMPLETE CBC W/AUTO DIFF WBC: CPT | Performed by: INTERNAL MEDICINE

## 2020-11-11 PROCEDURE — 84132 ASSAY OF SERUM POTASSIUM: CPT | Performed by: INTERNAL MEDICINE

## 2020-11-11 PROCEDURE — 72148 MRI LUMBAR SPINE W/O DYE: CPT

## 2020-11-11 PROCEDURE — 97530 THERAPEUTIC ACTIVITIES: CPT | Mod: GP

## 2020-11-11 PROCEDURE — 120N000001 HC R&B MED SURG/OB

## 2020-11-11 RX ORDER — ACETAMINOPHEN 325 MG/1
325 TABLET ORAL EVERY 6 HOURS PRN
Status: DISCONTINUED | OUTPATIENT
Start: 2020-11-11 | End: 2020-11-13 | Stop reason: HOSPADM

## 2020-11-11 RX ORDER — POTASSIUM CHLORIDE 1500 MG/1
20 TABLET, EXTENDED RELEASE ORAL ONCE
Status: COMPLETED | OUTPATIENT
Start: 2020-11-11 | End: 2020-11-11

## 2020-11-11 RX ORDER — ACETAMINOPHEN 325 MG/1
650 TABLET ORAL 4 TIMES DAILY
Status: DISCONTINUED | OUTPATIENT
Start: 2020-11-11 | End: 2020-11-13 | Stop reason: HOSPADM

## 2020-11-11 RX ADMIN — DOCUSATE SODIUM 50 MG AND SENNOSIDES 8.6 MG 2 TABLET: 8.6; 5 TABLET, FILM COATED ORAL at 22:49

## 2020-11-11 RX ADMIN — CALCIUM CARBONATE 600 MG (1,500 MG)-VITAMIN D3 400 UNIT TABLET 1 TABLET: at 10:29

## 2020-11-11 RX ADMIN — ACETAMINOPHEN 650 MG: 325 TABLET, FILM COATED ORAL at 10:28

## 2020-11-11 RX ADMIN — ACETAMINOPHEN 650 MG: 325 TABLET, FILM COATED ORAL at 22:50

## 2020-11-11 RX ADMIN — DOCUSATE SODIUM 50 MG AND SENNOSIDES 8.6 MG 2 TABLET: 8.6; 5 TABLET, FILM COATED ORAL at 08:35

## 2020-11-11 RX ADMIN — ACETAMINOPHEN 650 MG: 325 TABLET, FILM COATED ORAL at 19:45

## 2020-11-11 RX ADMIN — LEVOTHYROXINE SODIUM 75 MCG: 75 TABLET ORAL at 06:39

## 2020-11-11 RX ADMIN — POLYETHYLENE GLYCOL 3350 17 G: 17 POWDER, FOR SOLUTION ORAL at 08:35

## 2020-11-11 RX ADMIN — POTASSIUM CHLORIDE 20 MEQ: 1500 TABLET, EXTENDED RELEASE ORAL at 10:29

## 2020-11-11 RX ADMIN — IBUPROFEN 200 MG: 200 TABLET, FILM COATED ORAL at 08:35

## 2020-11-11 RX ADMIN — ENOXAPARIN SODIUM 40 MG: 40 INJECTION SUBCUTANEOUS at 19:45

## 2020-11-11 ASSESSMENT — ACTIVITIES OF DAILY LIVING (ADL)
ADLS_ACUITY_SCORE: 23
ADLS_ACUITY_SCORE: 20
ADLS_ACUITY_SCORE: 18

## 2020-11-11 NOTE — PLAN OF CARE
Rested well overnight. A&O but forgetful. Now requiring 1-2L O2. Saline locked. Mild pain in left shoulder, denies need for intervention. Regular diet. Council. Purewick in place d/t incontinence. SW ordered for TCU placement.

## 2020-11-11 NOTE — PROGRESS NOTES
[facility]    Medicine Progress Note - Hospitalist Service       Date of Admission:  11/9/2020  Assessment & Plan         Teresa Bates is a 89 year old female admitted on 11/9/2020. She presents after an unwitnessed fall at home.  She was likely on the floor for about 11 hours, between 9 PM yesterday at 8 AM today, before fall was discovered and she was brought to the emergency department for evaluation.  Laboratory studies are within normal limits with the exception of mild rhabdomyolysis.  Imaging studies show a probable acute compression fracture of T8, chronic compression fracture of L1, no pelvic or hip fractures, head CT with no acute changes. She is admitted for further evaluation and treatment.     Mechanical Fall  Hx of falls  Deconditioning  CT head negative for acute intracranial abnormality. Thoracic spine xray compression deformity of the T8 vertebral body. Acute fracture is not excluded. Lumbar xray chronic compression deformity of the L1 vertebral  body again noted. Fall appears to be mechanical. Patient reports she was walking backwards in the kitchen to turn her walker to get to the den when she lost her balance and fell.   - PT/OT eval recommend TCU  - social work following  - management of rhabdo as below    Rhabdomyolysis  Secondary to fall and being on floor for a prolonged period of time. Renal function stable   - CK 1284--1365--845, no further need to trended  - IVF discontinued overnight.     Compression fracture of T8 vertebra  Compression fracture of L1 lumbar vertebra, chronic  Complains of back pain especially when moved in bed.   - schedule Tylenol 650mg QID, voltaren gel prn  - will have neurosurgery consult for further recs  - Bowel regimen to prevent constipation      Hypothyroidism, unspecified type  TSH was slightly elevated on 9/15/2020 (12.03). Currently 7.79, FT4 1.09  - continue supplement without change  - recommend f/u TSH in 6 weeks in  outpatient setting    Osteopenia  - Continue calcium and vitamin D supplements           Diet: Combination Diet Regular Diet Adult    DVT Prophylaxis: Enoxaparin (Lovenox) SQ  Saravia Catheter: not present  Code Status: No CPR- Do NOT Intubate           Disposition Plan   Expected discharge: 1-2 days, recommended to transitional care unit once needs better pain control and also neurosurgery consult today.  Entered: Mariel Knapp MD 11/11/2020, 10:10 AM       The patient's care was discussed with the Bedside Nurse and Patient.    Mariel Knapp MD  Hospitalist Service  [facility]  Contact information available via [facility] Paging/Directory    ______________________________________________________________________    Interval History   Complains of some back pain. Denies nausea or vomiting. Denies abdominal pain.   Afebrile.   Therapy recommend TCU discharge. Patient understands that she is weak to go back to living by herself at this moment.    Data reviewed today: I reviewed all medications, new labs and imaging results over the last 24 hours. I personally reviewed no images or EKG's today.    Physical Exam   Vital Signs: Temp: 98  F (36.7  C) Temp src: Oral BP: 126/70 Pulse: 66   Resp: 18 SpO2: 96 % O2 Device: Nasal cannula Oxygen Delivery: 2 LPM  Weight: 119 lbs .77 oz  General Appearance: Alert, awake and no apparent distress  Respiratory: clear to auscultation bilaterally, no wheezing  Cardiovascular: regular rate and rhythm, +LE edema bilaterarlly  GI: soft and non-tender  Skin: warm and dry      Data   Recent Labs   Lab 11/11/20  0846 11/10/20  1229 11/09/20  1116   WBC 10.4  --  12.1*   HGB 12.4  --  13.4   *  --  102*     --  153     --  142   POTASSIUM 3.2*  --  3.7   CHLORIDE 108  --  111*   CO2 28  --  28   BUN 19  --  23   CR 0.59 0.49* 0.60   ANIONGAP 3  --  3   AMAN 7.8*  --  8.8   GLC 86  --  128*     No results found for this or any previous visit  (from the past 24 hour(s)).  Medications       acetaminophen  650 mg Oral 4x Daily     calcium carbonate 600 mg-vitamin D 400 units  1 tablet Oral Daily before lunch     enoxaparin ANTICOAGULANT  40 mg Subcutaneous Q24H     ibuprofen  200 mg Oral QAM     levothyroxine  75 mcg Oral Daily     polyethylene glycol  17 g Oral Daily     potassium chloride  20 mEq Oral Once     senna-docusate  1 tablet Oral BID    Or     senna-docusate  2 tablet Oral BID     sodium chloride (PF)  3 mL Intracatheter Q8H

## 2020-11-11 NOTE — PLAN OF CARE
Cognitive Concerns/ Orientation : A&Ox4, forgetful. Very [place]. R ear better than L. Slow speaking.   BEHAVIOR & AGGRESSION TOOL COLOR: Green  CIWA SCORE: NA   ABNL VS/O2: VSS on RA.   MOBILITY: Ax2, turn/repo Q 2 hrs. 1PA GB and BSC  PAIN MANAGMENT: L shoulder sore- heat applied. Legs sensitive to touch. Back pain with movement. Tylenol given x1.   DIET: Regular- pt requests no straws, but changed mind this evening. Meds in applesauce.   BOWEL/BLADDER: Incontinent of urine. Purewick in place/ changed. Continent of  BM during shift.   ABNL LAB/BG: CK 1,365, Crea 0.49, TSH 7.79  DRAIN/DEVICES: PIV R arm NS @ 75  TELEMETRY RHYTHM: NA   SKIN: Skin tears. Redness due to bed pan marks on bottom from ED- improving. Seen by WOC. No orders given. 2+edema bilat LE.   TESTS/PROCEDURES: NA  D/C DAY/GOALS/PLACE: Discharge pending; likely to TCU due to falls and may need Senior living/penitentiary due to frequent falls at home. Daughter updated.   OTHER IMPORTANT INFO: OT/PT/SW following

## 2020-11-11 NOTE — CONSULTS
Care Management Initial Consult    General Information  Assessment completed with:  Patient           Advance Care Planning:  No ACP docs        Communication Assessment  Patient's communication style: spoken language (English or Bilingual)    Hearing Difficulty or Deaf: yes   Wear Glasses or Blind: no    Cognitive  Cognitive/Neuro/Behavioral: WDL  Level of Consciousness: alert  Arousal Level: opens eyes spontaneously  Orientation: oriented x 4  Mood/Behavior: calm;cooperative  Best Language: 0 - No aphasia  Speech: slow;clear    Living Environment:   People in home:  Lives alone     Current living Arrangements: condominium      Able to return to prior arrangements:  TCU recommended       Family/Social Support:  Care provided by:  Family assists with shopping and cleaning. Pt. reports she was indep. with ADLs  Provides care for:  No one                 Equipment currently used at home: walker, rolling           Financial Concerns:  None noted. Patient has Medicare, and will likely get TCU coverage under the COVID waiver. Private pay transport and room fees discussed with patient           Lifestyle & Psychosocial Needs:  Lifestyle     Physical activity     Days per week: Not on file     Minutes per session: Not on file     Stress: Not at all     Social Needs     Financial resource strain: Not hard at all     Food insecurity     Worry: Never true     Inability: Never true     Transportation needs     Medical: No     Non-medical: No            Mental Health Status: No Dx noted          Chemical Dependency Status: No concerns noted               Additional Information:  TCU options discussed with patient today. She agrees to referrals to [facility] and was told of the private pay room charge there beginning on day 15.   DOD referrals sent.  Transport requested.  Patient did not want family involved at this time but agreed her son in law, Kendal could be called prior to discharge.  Explained he would need to bring her clothing/personal items to the TCU.  SW is following.      ANNIE Chavarria     Addendum  Sherly from [facility] has offered a bed to patient. Updated her that patient is not ready for discharge today now as anticipated earlier. Sherly will keep the bed available for 1-2 days.

## 2020-11-11 NOTE — CONSULTS
[facility]    Neurosurgery Consultation     Date of Admission:  11/9/2020  Date of Consult (When I saw the patient): 11/11/20    Assessment & Plan   Teresa Bates is an 89 year old female with history of acoustic neuroma, rhabdomyolysis, and osteopenia who was admitted on 11/9/2020 s/p unwitnessed fall at home. I was asked to see the patient for evaluation of T8 and L1 compression fractures. Imaging shows a probable acute compression fracture at T8, chronic compression fracture at L1, no pelvic or hip fractures, head CT with no acute changes. Patient states she lives independently in her condo. She was walking in her kitchen when she lost her balance and fell. This is her second fall since August 2020. She ambulates with a walker. She is alert and oriented x 3, able to follow commands. She is able to move all extremities, but feels as though her legs are weak since the fall. She reports mid back pain that occurs with activity/movement. There is midline tenderness with palpation of thoracic spine.      Compression fracture of T8 vertebra, age indeterminate     Chronic compression fracture of L1 vertebra     Plan:   - MRI of thoracic and lumbar spine for further evaluation  - Orthotics consult for TLSO brace   - PT/OT   - Further recommendations to follow once MRI results completed     ADDENDUM:  MRI lumbar spine with chronic L1 compression fracture, no evidence for acute fracture. Multilevel degeneration most advanced at L4-5 where there is spondylolisthesis with moderate bilateral foraminal stenosis.    MRI thoracic spine with acute appearing T8 compression fracture with up to 30-35% vertebral height loss and mild retropulsion. Moderate left T8-9 foraminal stenosis.     Plan:  -Continue with TLSO as ordered  -Pain control measures  -PT/OT     I have discussed the following assessment and plan with Dr. Geiger who is in agreement with initial plan and will follow up with further  consultation recommendations.    Kiya Michel CNP  [facility]  [address]  [place] [address]  Tel [phone]  Pager [phone]        Code Status    No CPR- Do NOT Intubate    Reason for Consult   Reason for consult: I was asked by Dr. Knapp to evaluate this patient for compression fractures.    Primary Care Physician   Shravan Bourgeois    Chief Complaint   Back pain     History is obtained from the patient and EMR.    History of Present Illness   Teresa Bates is an 89 year old female with history of acoustic neuroma, rhabdomyolysis, and osteopenia who was admitted on 11/9/2020 s/p unwitnessed fall at home. I was asked to see the patient for evaluation of T8 and L1 compression fractures. Imaging shows a probable acute compression fracture at T8, chronic compression fracture at L1, no pelvic or hip fractures, head CT with no acute changes. Patient states she lives independently in her condo. She was walking in her kitchen when she lost her balance and fell. This is her second fall since August 2020. She ambulates with a walker. She is alert and oriented x 3, able to follow commands. She is able to move all extremities, but feels as though her legs are weak since the fall. She reports mid back pain that occurs with activity/movement. There is midline tenderness with palpation of thoracic spine.    Past Medical History   I have reviewed this patient's medical history and updated it with pertinent information if needed.   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  Past Surgical History:   Procedure Laterality Date     Stereotactic radiation to acustic neuroma on left with Dr. Galindo Left 05/10/200        Prior to Admission Medications   Prior to Admission Medications   Prescriptions Last Dose Informant Patient Reported? Taking?   Multiple Vitamins-Minerals (CENTRUM SILVER 50+WOMEN PO) unknown Son Yes Yes   Sig: Take 1 tablet by mouth daily (before lunch)    calcium-vitamin D (CALTRATE) 600-400 MG-UNIT per tablet unknown Son Yes Yes   Sig: Take 1 tablet by mouth daily (before lunch)    ibuprofen (ADVIL/MOTRIN) 200 MG tablet unknown Son Yes Yes   Sig: Take 200 mg by mouth every morning   levothyroxine (SYNTHROID/LEVOTHROID) 75 MCG tablet unknown Son No Yes   Sig: Take 1 tablet (75 mcg) by mouth daily   polyethylene glycol (MIRALAX) 17 g packet  at prn Son No Yes   Sig: Take 17 g by mouth daily as needed for constipation      Facility-Administered Medications: None     Allergies   No Known Allergies    Social History   I have reviewed this patient's social history and updated it with pertinent information if needed. Teresa Bates  reports that she has quit smoking. Her smoking use included cigarettes. She has a 30.00 pack-year smoking history. She has never used smokeless tobacco. She reports that she does not drink alcohol or use drugs.    Family History   I have reviewed this patient's family history and updated it with pertinent information if needed.   Family History   Problem Relation Age of Onset     Family History Negative Mother      Myocardial Infarction Father      Multiple Sclerosis Daughter      Osteoporosis Daughter        Review of Systems   10 point ROS negative other than symptoms noted in HPI    Physical Exam   Temp: 98  F (36.7  C) Temp src: Oral BP: 126/70 Pulse: 66   Resp: 18 SpO2: 96 % O2 Device: Nasal cannula Oxygen Delivery: 2 LPM  Vital Signs with Ranges  Temp:  [97.3  F (36.3  C)-98  F (36.7  C)] 98  F (36.7  C)  Pulse:  [66-92] 66  Resp:  [16-18] 18  BP: (102-126)/(51-70) 126/70  SpO2:  [87 %-96 %] 96 %  119 lbs .77 oz     , Blood pressure 126/70, pulse 66, temperature 98  F  "(36.7  C), temperature source Oral, resp. rate 18, height 5' 3\" (1.6 m), weight 119 lb 0.8 oz (54 kg), SpO2 96 %, not currently breastfeeding.  119 lbs .77 oz  HEENT:  Normocephalic, atraumatic.  PERRLA.  EOM s intact.   Neck:  Supple, non-tender, without lymphadenopathy.  Heart:  No peripheral edema  Lungs:  No SOB  Abdomen:  Soft, non-tender, non-distended.  Normal bowel sounds.  Skin:  Warm and dry, good capillary refill.  Extremities:  Good radial and dorsalis pedis pulses bilaterally, no edema, cyanosis or clubbing.    NEUROLOGICAL EXAMINATION:   Mental status:  Alert and Oriented x 3, speech is fluent.  Cranial nerves:  II-XII intact.   Motor: Moves all extremities equally, generalized weakness in legs   Sensation:  Intact  Reflexes:   Negative Babinski.  Negative Clonus.    Coordination:  Smooth finger to nose testing.   Negative pronator drift.      Midline tenderness with palpation of thoracic spine.    Data     CBC RESULTS:   Recent Labs   Lab Test 11/11/20  0846   WBC 10.4   RBC 3.72*   HGB 12.4   HCT 37.8   *   MCH 33.3*   MCHC 32.8   RDW 14.0        Basic Metabolic Panel:  Lab Results   Component Value Date     11/11/2020      Lab Results   Component Value Date    POTASSIUM 3.2 11/11/2020     Lab Results   Component Value Date    CHLORIDE 108 11/11/2020     Lab Results   Component Value Date    AMAN 7.8 11/11/2020     Lab Results   Component Value Date    CO2 28 11/11/2020     Lab Results   Component Value Date    BUN 19 11/11/2020     Lab Results   Component Value Date    CR 0.59 11/11/2020     Lab Results   Component Value Date    GLC 86 11/11/2020     INR:  No results found for: INR      "

## 2020-11-11 NOTE — PLAN OF CARE
DATE & TIME: 11/11/20 4885-7036    Cognitive Concerns/ Orientation : Orientedx4, forgetful   BEHAVIOR & AGGRESSION TOOL COLOR: green   ABNL VS/O2: VSS, on 2L   MOBILITY: Up with 2 GB+walker. Turn and repo while in bed q2h. Very weak.  PAIN MANAGMENT: Back pain secondary to compression fracture. Scheduled tylenol and advil.   DIET: Regular, good appetite.  BOWEL/BLADDER: Incontinent of urine, purewick in use.   ABNL LAB/BG: Potassium 3.2, replaced. Recheck due at 1500. CK elevated but trending down.   DRAIN/DEVICES: PIV saline locked.  SKIN: Bruising. Blanchable redness on coccyx. Encourage repositioning as patient allows.  TESTS/PROCEDURES: MRI of spine done today.  D/C DAY/GOALS/PLACE: Possibly tomorrow pending results of MRI. Will go to TCU.   OTHER IMPORTANT INFO:

## 2020-11-12 ENCOUNTER — APPOINTMENT (OUTPATIENT)
Dept: GENERAL RADIOLOGY | Facility: CLINIC | Age: 85
DRG: 552 | End: 2020-11-12
Attending: INTERNAL MEDICINE
Payer: MEDICARE

## 2020-11-12 ENCOUNTER — APPOINTMENT (OUTPATIENT)
Dept: OCCUPATIONAL THERAPY | Facility: CLINIC | Age: 85
DRG: 552 | End: 2020-11-12
Payer: MEDICARE

## 2020-11-12 ENCOUNTER — APPOINTMENT (OUTPATIENT)
Dept: PHYSICAL THERAPY | Facility: CLINIC | Age: 85
DRG: 552 | End: 2020-11-12
Payer: MEDICARE

## 2020-11-12 LAB — POTASSIUM SERPL-SCNC: 3.3 MMOL/L (ref 3.4–5.3)

## 2020-11-12 PROCEDURE — 250N000011 HC RX IP 250 OP 636: Performed by: INTERNAL MEDICINE

## 2020-11-12 PROCEDURE — 97530 THERAPEUTIC ACTIVITIES: CPT | Mod: GO

## 2020-11-12 PROCEDURE — 36415 COLL VENOUS BLD VENIPUNCTURE: CPT | Performed by: INTERNAL MEDICINE

## 2020-11-12 PROCEDURE — 84132 ASSAY OF SERUM POTASSIUM: CPT | Performed by: INTERNAL MEDICINE

## 2020-11-12 PROCEDURE — 250N000013 HC RX MED GY IP 250 OP 250 PS 637: Performed by: INTERNAL MEDICINE

## 2020-11-12 PROCEDURE — 99233 SBSQ HOSP IP/OBS HIGH 50: CPT | Performed by: INTERNAL MEDICINE

## 2020-11-12 PROCEDURE — 120N000001 HC R&B MED SURG/OB

## 2020-11-12 PROCEDURE — 97530 THERAPEUTIC ACTIVITIES: CPT | Mod: GP

## 2020-11-12 PROCEDURE — 97535 SELF CARE MNGMENT TRAINING: CPT | Mod: GO

## 2020-11-12 PROCEDURE — 71045 X-RAY EXAM CHEST 1 VIEW: CPT

## 2020-11-12 RX ORDER — POTASSIUM CHLORIDE 1500 MG/1
20 TABLET, EXTENDED RELEASE ORAL ONCE
Status: COMPLETED | OUTPATIENT
Start: 2020-11-12 | End: 2020-11-12

## 2020-11-12 RX ORDER — FUROSEMIDE 10 MG/ML
20 INJECTION INTRAMUSCULAR; INTRAVENOUS ONCE
Status: COMPLETED | OUTPATIENT
Start: 2020-11-12 | End: 2020-11-12

## 2020-11-12 RX ORDER — POTASSIUM CHLORIDE 1.5 G/1.58G
40 POWDER, FOR SOLUTION ORAL ONCE
Status: COMPLETED | OUTPATIENT
Start: 2020-11-12 | End: 2020-11-12

## 2020-11-12 RX ADMIN — ENOXAPARIN SODIUM 40 MG: 40 INJECTION SUBCUTANEOUS at 20:16

## 2020-11-12 RX ADMIN — POTASSIUM CHLORIDE 40 MEQ: 1.5 POWDER, FOR SOLUTION ORAL at 21:33

## 2020-11-12 RX ADMIN — ACETAMINOPHEN 650 MG: 325 TABLET, FILM COATED ORAL at 12:38

## 2020-11-12 RX ADMIN — POTASSIUM CHLORIDE 20 MEQ: 1500 TABLET, EXTENDED RELEASE ORAL at 12:38

## 2020-11-12 RX ADMIN — FUROSEMIDE 20 MG: 10 INJECTION, SOLUTION INTRAVENOUS at 12:39

## 2020-11-12 RX ADMIN — DOCUSATE SODIUM 50 MG AND SENNOSIDES 8.6 MG 1 TABLET: 8.6; 5 TABLET, FILM COATED ORAL at 09:19

## 2020-11-12 RX ADMIN — ACETAMINOPHEN 650 MG: 325 TABLET, FILM COATED ORAL at 09:18

## 2020-11-12 RX ADMIN — CALCIUM CARBONATE 600 MG (1,500 MG)-VITAMIN D3 400 UNIT TABLET 1 TABLET: at 12:38

## 2020-11-12 RX ADMIN — ACETAMINOPHEN 650 MG: 325 TABLET, FILM COATED ORAL at 20:16

## 2020-11-12 RX ADMIN — LEVOTHYROXINE SODIUM 75 MCG: 75 TABLET ORAL at 06:48

## 2020-11-12 RX ADMIN — IBUPROFEN 200 MG: 200 TABLET, FILM COATED ORAL at 09:19

## 2020-11-12 ASSESSMENT — ACTIVITIES OF DAILY LIVING (ADL)
ADLS_ACUITY_SCORE: 22
ADLS_ACUITY_SCORE: 23
ADLS_ACUITY_SCORE: 18
ADLS_ACUITY_SCORE: 24
ADLS_ACUITY_SCORE: 22
ADLS_ACUITY_SCORE: 23

## 2020-11-12 NOTE — PROGRESS NOTES
Care Management Follow Up    Length of Stay (days): 3    Expected Discharge Date: 11/13/2020     Concerns to be Addressed:     discharge planning  Patient plan of care discussed at interdisciplinary rounds: Yes    Anticipated Discharge Disposition:  [facility] TCU     Anticipated Discharge Services:  TCU  Anticipated Discharge DME:  none    Patient/family educated on Medicare website which has current facility and service quality ratings:  yes  Education Provided on the Discharge Plan:  yes  Patient/Family in Agreement with the Plan:  yes    Referrals Placed by CM/SW:  TCU  Private pay costs discussed: transportation costs    Additional Information:  Reviewed TCU referrals with pt. She is agreeable to discharging to [facility]. SW provided her with information on the facility, per her request. She reports she will update her family. She would like transportation arranged to get there and understands she will be billed for the cost of the ride.       RENNY Wise, LGSW  [phone]  [facility]

## 2020-11-12 NOTE — PROGRESS NOTES
[facility]    Neurosurgery  Daily Note    Assessment & Plan   Acute T8 compression fracture.   TLSO obtained yesterday. She does note mid thoracic back pain, no radicular symptoms.   Brace when OOB.   [facility]u [facility] 6 weeks with new xrays prior.         Sudhir Faustin    Interval History   Stable.  Doing well.  Improving slowly.  Pain is reasonably controlled.  No fevers.     Physical Exam   Temp: 98  F (36.7  C) Temp src: Oral BP: (!) 155/92 Pulse: 98   Resp: 18 SpO2: 92 % O2 Device: None (Room air) Oxygen Delivery: 1 LPM  Vitals:    11/09/20 0954 11/09/20 1803   Weight: 54 kg (119 lb) 54 kg (119 lb 0.8 oz)     Vital Signs with Ranges  Temp:  [97.9  F (36.6  C)-98  F (36.7  C)] 98  F (36.7  C)  Pulse:  [67-98] 98  Resp:  [18] 18  BP: (127-155)/(72-92) 155/92  SpO2:  [91 %-95 %] 92 %  I/O last 3 completed shifts:  In: -   Out: 500 [Urine:500]    Alert and oriented.  Moves all extremities equally.        Medications        acetaminophen  650 mg Oral 4x Daily     calcium carbonate 600 mg-vitamin D 400 units  1 tablet Oral Daily before lunch     enoxaparin ANTICOAGULANT  40 mg Subcutaneous Q24H     ibuprofen  200 mg Oral QAM     levothyroxine  75 mcg Oral Daily     polyethylene glycol  17 g Oral Daily     senna-docusate  1 tablet Oral BID    Or     senna-docusate  2 tablet Oral BID     sodium chloride (PF)  3 mL Intracatheter Q8H           Sudhir HANLEY [facility] Neurosurgery  [facility]  [address]  [address]  [place] [address]    Tel [phone]  Pager [phone]

## 2020-11-12 NOTE — PROGRESS NOTES
"11/11/20, [place] [address] [facility] 6637/6637-01, female, Height: 5' 3\", Weight: 119 lbs .77 oz, Ordered by:  Dx:    Traumatic rhabdomyolysis, initial encounter (H)  Compression fracture of T8 vertebra, initial encounter (H) , MRN #: [id number].    S:   Patient was seen today at 6637/6637-01 present for the measurement and delivery of a Breg backpack  thoracolumbosacral orthosis (Ref # [id number] / [id number]).  Patient appears pleasant. Due to the patient age/limited abilities I feel the 456 is the best realistic TLSO option for her.  Health History & Progression:   Patient's history of utilizing a TLSO spinal orthosis: no history of utilizing orthoses for ailment being addressed today.  Patient would like to utilize the brace provided today to achieve the following functional goals:     O:   The patient is laying down in bed alert very but very hard of hearing.  I customized the fit of a Breg Horizon TLSO donning the brace and the fit is adequate. The patient requires an orthosis that will be used for OOB activities.     A:     Patient s ailment recovery in rehabilitation is expected to be managed well with the utilization of the back brace.  The patient will require assistance to cm and doff the TLSO most likely not being able to cm it on her own.  Upon fitting the brace the patient vocalized satisfaction with the fit and feel of the orthosis. The trimlines and widths of the brace presented satisfactory after the waist straps were set to size between 2-3.   Patient's nurse signed the delivery ticket because the patient was unable to.  Patient's RN and was given the [facility] receipt information sheet.    Goal:   The OTS spinal brace will be used to reduce pain by restricting mobility of the trunk, to facilitate healing following the injury to the spine and related soft tissue and to support weak spinal muscles.     P:  Patient's nurse was " given the verbal and written instructions on how to don/doff/care for the brace. Patient will utilize the orthosis for the duration of rehabilitation/PT in the hospital and at home activities upon discharge for the duration of treatment of patient ailment or until use of orthosis is no longer necessary or duration of the treatment or unless otherwise specified by the patient's provider. Will follow-up PRN.    Electronically signed by Dudley PIERCE

## 2020-11-12 NOTE — PLAN OF CARE
DATE & TIME: 11/11/20 8464-1818          Cognitive Concerns/ Orientation : Orientedx4, forgetful   BEHAVIOR & AGGRESSION TOOL COLOR: green              ABNL VS/O2: VSS on RA  MOBILITY: Up with 2 GB+walker. Turn and repo while in bed q2h. Generalized weakness  PAIN MANAGMENT: Back pain secondary to compression fracture. Scheduled tylenol and advil.   DIET: Regular, good appetite.  BOWEL/BLADDER: Incontinent of urine, purewick in use.   ABNL LAB/BG: Potassium replaced, Recheck 3.5. CK elevated but trending down.   DRAIN/DEVICES: PIV saline locked.  SKIN: Bruising. Blanchable redness on coccyx. Encourage repositioning q2  Mepilex to left shoulder and left elbow from fall, CDI.   TESTS/PROCEDURES: MRI of spine completed  D/C DAY/GOALS/PLACE: Possibly today pending results of MRI, to TCU.   OTHER IMPORTANT INFO: Neurosurgery following. Orthosis brace to use on back when patient OOB. Meds crush in applesauce.Continue to monitor.

## 2020-11-12 NOTE — PLAN OF CARE
DATE & TIME: 11/11/20 3315-1708    Cognitive Concerns/ Orientation : Orientedx4, forgetful   BEHAVIOR & AGGRESSION TOOL COLOR: green   ABNL VS/O2: VSS, on 1L at 94%  MOBILITY: Up with 2 GB+walker. Turn and repo while in bed q2h. Very weak and immobile.   PAIN MANAGMENT: Back pain secondary to compression fracture. Scheduled tylenol and advil.   DIET: Regular, good appetite.  BOWEL/BLADDER: Incontinent of urine, purewick in use.   ABNL LAB/BG: Potassium 3.2, replaced. Recheck 3.5. CK elevated but trending down.   DRAIN/DEVICES: PIV saline locked.  SKIN: Bruising. Blanchable redness on coccyx. Encourage repositioning as patient allows. Mepilex to left shoulder and left elbow from fall, CDI.   TESTS/PROCEDURES: MRI of spine done today.  D/C DAY/GOALS/PLACE: Possibly tomorrow pending results of MRI, to TCU.   OTHER IMPORTANT INFO: Neurosurgery following. Orthosis brace to use on back when patient OOB. Meds crush in applesauce.

## 2020-11-12 NOTE — PROGRESS NOTES
[facility]    Medicine Progress Note - Hospitalist Service       Date of Admission:  11/9/2020  Assessment & Plan         Teresa Bates is a 89 year old female admitted on 11/9/2020. She presents after an unwitnessed fall at home.  She was likely on the floor for about 11 hours, between 9 PM yesterday at 8 AM today, before fall was discovered and she was brought to the emergency department for evaluation.  Laboratory studies are within normal limits with the exception of mild rhabdomyolysis.  Imaging studies show a probable acute compression fracture of T8, chronic compression fracture of L1, no pelvic or hip fractures, head CT with no acute changes. She is admitted for further evaluation and treatment.     Mechanical Fall  Hx of falls  Deconditioning  CT head negative for acute intracranial abnormality. Thoracic spine xray compression deformity of the T8 vertebral body. Acute fracture is not excluded. Lumbar xray chronic compression deformity of the L1 vertebral  body again noted. Fall appears to be mechanical. Patient reports she was walking backwards in the kitchen to turn her walker to get to the den when she lost her balance and fell.   - PT/OT eval recommend TCU  - social work following  - management of rhabdo as below    Rhabdomyolysis  Secondary to fall and being on floor for a prolonged period of time. Renal function stable   - CK [id number], no further need to trended  - IVF discontinued overnight.     Compression fracture of T8 vertebra  Compression fracture of L1 lumbar vertebra, chronic  - MRI t spine- recent-appearing T8 compression fracture with moderate associated vertebral height loss and posterior buckling/retropulsion of the posterior vertebral cortex along the ventral aspect of the spinal  canal, resulting in mild spinal canal stenosis. MRI L spine-Old L1 compression deformity. See report for detail   - TLSO brace when OOB  - [facility]  in 6 wks   - appreciate neuro surgery consult follow up  - schedule Tylenol 650mg QID, voltaren gel prn  - Bowel regimen to prevent constipation    Small bilateral pleural effusions  Noted on mri of spine. Follow up CXR showed small effusions bilaterally. suspect this is secondary to IVF resuscitation she received due to rhabdo. She denies shortness of breath, chest pain or cough. TTE 8/16/20-LVEF 60-65%  - Lasix 20mg x 1 with KCl 20mg PO x 1  - check K this afternoon and replace as needed  - monitor I/Os  -f/u bmp in AM    Hypothyroidism, unspecified type  TSH was slightly elevated on 9/15/2020 (12.03). Currently 7.79, FT4 1.09  - continue supplement without change  - recommend f/u TSH in 6 weeks in outpatient setting    Osteopenia  - Continue calcium and vitamin D supplements           Diet: Combination Diet Regular Diet Adult    DVT Prophylaxis: Enoxaparin (Lovenox) SQ  Saravia Catheter: not present  Code Status: No CPR- Do NOT Intubate           Disposition Plan   Expected discharge: 1-2 days, recommended to transitional care unit once lasix today, work with PT with her TLSO.  Entered: Mariel Knapp MD 11/12/2020, 12:01 PM       The patient's care was discussed with the Bedside Nurse and Patient.    Mariel Knapp MD  Hospitalist Service  [facility]  Contact information available via [facility] Paging/Directory    ______________________________________________________________________    Interval History   Patient complains of back pain. Has not been out of bed yet. +Bm this morning  Denies chest pain or shortness of breath.      Data reviewed today: I reviewed all medications, new labs and imaging results over the last 24 hours. I personally reviewed no images or EKG's today.    Physical Exam   Vital Signs: Temp: 98  F (36.7  C) Temp src: Oral BP: (!) 155/92 Pulse: 98   Resp: 18 SpO2: 92 % O2 Device: None (Room air) Oxygen Delivery: 1 LPM  Weight: 119 lbs .77 oz  General  Appearance: Alert, awake and no apparent distress  Respiratory: diminished breath sounds at the bases, otherwise clear to auscultation bilaterally, no wheezing  Cardiovascular: regular rate and rhythm, +LE edema bilaterarlly  GI: soft and non-tender  Skin: warm and dry      Data   Recent Labs   Lab 11/11/20  1551 11/11/20  0846 11/10/20  1229 11/09/20  1116   WBC  --  10.4  --  12.1*   HGB  --  12.4  --  13.4   MCV  --  102*  --  102*   PLT  --  150  --  153   NA  --  139  --  142   POTASSIUM 3.5 3.2*  --  3.7   CHLORIDE  --  108  --  111*   CO2  --  28  --  28   BUN  --  19  --  23   CR  --  0.59 0.49* 0.60   ANIONGAP  --  3  --  3   AMAN  --  7.8*  --  8.8   GLC  --  86  --  128*     Recent Results (from the past 24 hour(s))   MR Thoracic Spine w/o Contrast    Narrative    MRI THORACIC SPINE WITHOUT CONTRAST  11/11/2020 3:17 PM     HISTORY: T8 compression fracture.    TECHNIQUE: Multiplanar multisequence MRI of the thoracic spine without  contrast.    COMPARISON: Thoracic spine radiographs dated 11/9/2020.    FINDINGS:  There is a recent-appearing moderate T8 compression fracture with up  to approximately 30-35% associated vertebral height loss. There is  posterior buckling/mild retropulsion of the posterior T8 vertebral  body cortex along the ventral aspect of the spinal canal, resulting in  effacement of the ventral thecal sac and minimal mass effect on the  ventral aspect of the cord. There is mild spinal canal stenosis at the  level of the T8 fracture.    No other acute fracture identified. Mild chronic-appearing concavity  of the T1 and T3 superior endplates and chronic mild L1 superior  endplate compression fracture, with mild retropulsion of the  posterior-superior L1 fracture fragment, mildly indenting the ventral  aspect of the thecal sac at that level. There is no high-grade spinal  canal stenosis. There is a normal appearance of the spinal cord. There  is mild multilevel degenerative disc disease. Mild  to moderate  multilevel facet arthrosis. Moderate bordering on moderate to severe  left T8-T9 neural foraminal stenosis in the setting of prominent  posterior endplate and facet hypertrophy. No high-grade neural  foraminal narrowing seen elsewhere in the thoracic spine. Multilevel  perineural cysts in the neural foramina, the most prominent of which  is seen in the left T6-T7 neural foramen (series 17 image 12).    Multilevel degenerative changes of the cervical spine are partially  visualized on the localizer images, including at least moderate spinal  canal stenosis at multiple levels. Bilateral small to medium sized  pleural effusions are present. There is mild nonspecific subcutaneous  edema overlying the mid to lower thoracic region. Otherwise  unremarkable paraspinous soft tissues.      Impression    IMPRESSION:  1. Recent-appearing T8 compression fracture with moderate associated  vertebral height loss and posterior buckling/retropulsion of the  posterior vertebral cortex along the ventral aspect of the spinal  canal, resulting in mild spinal canal stenosis.  2. Multiple chronic thoracic and upper lumbar compression deformities,  as detailed.  3. Moderate bordering on moderate to severe left T8-T9 neural  foraminal stenosis, with lesser degrees of neural foraminal narrowing  elsewhere. No high-grade spinal canal stenosis.  4. Bilateral pleural effusions.    GAMALIEL PIERRE MD   MR Lumbar Spine w/o Contrast    Narrative    MR LUMBAR SPINE WITHOUT CONTRAST 11/11/2020 3:38 PM     HISTORY: Chronic L1 compression fracture, generalized leg weakness.    TECHNIQUE: Multiplanar multisequence images were obtained through the  lumbar spine without contrast.    COMPARISON: 11/9/2020 plain radiograph.    FINDINGS: Five lumbar type vertebral bodies are assumed. There is an  L1 compression deformity with anterior wedging. This vertebral body  has some artifact over it. There is no definite acute abnormal signal  in the L1  vertebral body. Remainder of the lumbar vertebral bodies and  visualized T12 vertebral body are preserved in height. There is  retrolisthesis at L2-L3 and degenerative spondylolisthesis at L4-L5  measuring approximately 3-4 mm a piece. Posterior alignment is  otherwise normal. The conus medullaris is normal in appearance with  its tip at the L1 vertebral segment. Visualized paraspinal soft  tissues and bony pelvis are normal.    T12-L1: Minimal disc bulge and facet hypertrophy. No stenosis.    L1-L2: Minimal disc bulge and facet hypertrophy. No stenosis.    L2-L3: Retrolisthesis, posterior osteophyte formation and facet  hypertrophy is present causing mild central canal stenosis but no  significant neural foraminal stenosis.    L3-L4: Broad-based disc bulge and mild to moderate facet and  ligamentum flavum hypertrophy is present causing some mild to moderate  bilateral neural foraminal stenosis and mild central canal stenosis.    L4-L5: Degenerative spondylolisthesis is present measuring  approximately 4 mm. There is also moderate to severe facet  hypertrophy. There is secondary moderate bilateral neural foraminal  stenosis and mild central canal stenosis.    L5-S1: Minimal disc bulging and mild to moderate facet and ligamentum  flavum hypertrophy is present but there is no significant stenosis.      Impression    IMPRESSION:  1. Old L1 compression deformity. No evidence for any acute fracture.  2. Multilevel degenerative disc and facet disease most advanced at  L4-L5 where there is degenerative spondylolisthesis resulting in  moderate bilateral neural foraminal stenosis and mild central canal  stenosis. There is also mild to moderate bilateral neural foraminal  stenosis and mild central canal stenosis at L3-L4 as well as mild  central canal stenosis at L2-L3.    SAMY RAGLAND MD   XR Chest Port 1 View    Narrative    CHEST PORTABLE ONE VIEW   11/12/2020 8:50 AM     HISTORY: Evaluate pleural effusion noted on MRI of  spine.    COMPARISON: Chest CTA on 8/15/2020      Impression    IMPRESSION: Single portable AP view of the chest was obtained. Mild  enlargement of the cardiac silhouette. Atherosclerotic vascular  calcification of the aortic knob. Small bilateral pleural effusions  and associated basilar atelectasis/consolidation. No significant  pneumothorax.    AARON VILLA MD     Medications       acetaminophen  650 mg Oral 4x Daily     calcium carbonate 600 mg-vitamin D 400 units  1 tablet Oral Daily before lunch     enoxaparin ANTICOAGULANT  40 mg Subcutaneous Q24H     furosemide  20 mg Intravenous Once     ibuprofen  200 mg Oral QAM     levothyroxine  75 mcg Oral Daily     polyethylene glycol  17 g Oral Daily     potassium chloride  20 mEq Oral Once     senna-docusate  1 tablet Oral BID    Or     senna-docusate  2 tablet Oral BID     sodium chloride (PF)  3 mL Intracatheter Q8H

## 2020-11-12 NOTE — PLAN OF CARE
DATE & TIME: 11/12/2020 9758-1198    Cognitive Concerns/ Orientation : A&OX4.  Forgetful   BEHAVIOR & AGGRESSION TOOL COLOR: Green  CIWA SCORE: NA  ABNL VS/O2: VSS.  O2 92% on RA.  LS diminished  MOBILITY: Assist of 2 to the commode.  Turn and repo q2 hrs.  TSLO brace to be on with transfers  PAIN MANAGMENT: Scheduled tylenol and ibuprofen  DIET: Regular  BOWEL/BLADDER: Incontinent of bowel.  Purwick in place  ABNL LAB/BG: WNL  DRAIN/DEVICES: IV SL  TELEMETRY RHYTHM: NA  SKIN: Wound on left elbow and rt shoulder.  Drsg changed.  Non blanchable redness on left buttock.  Mepilex placed.  Blanchable redness on rt elbow, heels, and spine.  Continue to encourage pt to turn and repo.  TESTS/PROCEDURES: Chest x-ray completed today  D/C DAY/GOALS/PLACE: Tomorrow to TCU if stable  OTHER IMPORTANT INFO: Pt is deaf in left ear.  Kashia in right. 2+ Edema in LE.  MD ordered IV lasix with K.  Will recheck K at 1700.   MD/RN ROUNDING SIGNED OFF D/E SHIFT: Y  COMMIT TO SIT DONE AND SIGNED OFF Y

## 2020-11-13 ENCOUNTER — APPOINTMENT (OUTPATIENT)
Dept: PHYSICAL THERAPY | Facility: CLINIC | Age: 85
DRG: 552 | End: 2020-11-13
Payer: MEDICARE

## 2020-11-13 VITALS
WEIGHT: 119.05 LBS | TEMPERATURE: 98 F | SYSTOLIC BLOOD PRESSURE: 132 MMHG | OXYGEN SATURATION: 95 % | BODY MASS INDEX: 21.09 KG/M2 | HEART RATE: 94 BPM | DIASTOLIC BLOOD PRESSURE: 75 MMHG | RESPIRATION RATE: 16 BRPM | HEIGHT: 63 IN

## 2020-11-13 LAB
ANION GAP SERPL CALCULATED.3IONS-SCNC: 4 MMOL/L (ref 3–14)
BUN SERPL-MCNC: 14 MG/DL (ref 7–30)
CALCIUM SERPL-MCNC: 7.7 MG/DL (ref 8.5–10.1)
CHLORIDE SERPL-SCNC: 107 MMOL/L (ref 94–109)
CO2 SERPL-SCNC: 26 MMOL/L (ref 20–32)
CREAT SERPL-MCNC: 0.47 MG/DL (ref 0.52–1.04)
GFR SERPL CREATININE-BSD FRML MDRD: 88 ML/MIN/{1.73_M2}
GLUCOSE SERPL-MCNC: 93 MG/DL (ref 70–99)
POTASSIUM SERPL-SCNC: 4 MMOL/L (ref 3.4–5.3)
SODIUM SERPL-SCNC: 137 MMOL/L (ref 133–144)

## 2020-11-13 PROCEDURE — 99239 HOSP IP/OBS DSCHRG MGMT >30: CPT | Performed by: INTERNAL MEDICINE

## 2020-11-13 PROCEDURE — 97530 THERAPEUTIC ACTIVITIES: CPT | Mod: GP

## 2020-11-13 PROCEDURE — 250N000013 HC RX MED GY IP 250 OP 250 PS 637: Performed by: INTERNAL MEDICINE

## 2020-11-13 PROCEDURE — 80048 BASIC METABOLIC PNL TOTAL CA: CPT | Performed by: INTERNAL MEDICINE

## 2020-11-13 PROCEDURE — 36415 COLL VENOUS BLD VENIPUNCTURE: CPT | Performed by: INTERNAL MEDICINE

## 2020-11-13 RX ORDER — LIDOCAINE 4 G/G
1-2 PATCH TOPICAL
Status: DISCONTINUED | OUTPATIENT
Start: 2020-11-13 | End: 2020-11-13 | Stop reason: HOSPADM

## 2020-11-13 RX ORDER — ACETAMINOPHEN 325 MG/1
650 TABLET ORAL 4 TIMES DAILY
DISCHARGE
Start: 2020-11-13 | End: 2020-11-20

## 2020-11-13 RX ORDER — LIDOCAINE 4 G/G
1 PATCH TOPICAL EVERY 24 HOURS
Qty: 7 PATCH | DISCHARGE
Start: 2020-11-13 | End: 2020-12-14

## 2020-11-13 RX ORDER — ACETAMINOPHEN 325 MG/1
325 TABLET ORAL EVERY 6 HOURS PRN
DISCHARGE
Start: 2020-11-13 | End: 2020-12-03

## 2020-11-13 RX ADMIN — ACETAMINOPHEN 650 MG: 325 TABLET, FILM COATED ORAL at 12:24

## 2020-11-13 RX ADMIN — CALCIUM CARBONATE 600 MG (1,500 MG)-VITAMIN D3 400 UNIT TABLET 1 TABLET: at 12:24

## 2020-11-13 RX ADMIN — ACETAMINOPHEN 650 MG: 325 TABLET, FILM COATED ORAL at 09:29

## 2020-11-13 RX ADMIN — LIDOCAINE 2 PATCH: 560 PATCH PERCUTANEOUS; TOPICAL; TRANSDERMAL at 12:23

## 2020-11-13 RX ADMIN — LEVOTHYROXINE SODIUM 75 MCG: 75 TABLET ORAL at 07:02

## 2020-11-13 RX ADMIN — IBUPROFEN 200 MG: 200 TABLET, FILM COATED ORAL at 09:29

## 2020-11-13 ASSESSMENT — ACTIVITIES OF DAILY LIVING (ADL)
ADLS_ACUITY_SCORE: 26
ADLS_ACUITY_SCORE: 24

## 2020-11-13 NOTE — PLAN OF CARE
Physical Therapy Discharge Summary    Reason for therapy discharge:    Discharged to transitional care facility.    Progress towards therapy goal(s). See goals on Care Plan in [facility] electronic health record for goal details.  Goals partially met.  Barriers to achieving goals:   discharge from facility.    Therapy recommendation(s):    Continued therapy is recommended.  Rationale/Recommendations:  to improve IND with functional mobility as pt currently below baseline at this time .

## 2020-11-13 NOTE — PROGRESS NOTES
Discharge    Patient discharged to TCU via transport  Care plan note:  VSS.  No c/o chest pain or SOB.  UP with strong assist of 2 to chair.  Tylenol and ibuprofen given for back pain.  Lidoderm patches also applies.      Listed belongings gathered and returned to patient. Y  Care Plan and Patient education resolved:Y  Prescriptions if needed, hard copies sent with patient Y  Home and hospital acquired medications returned to patient:NA  Medication Bin checked and emptied on discharge Y  Follow up appointment made for patient:NAZARIO

## 2020-11-13 NOTE — DISCHARGE SUMMARY
[facility]  Hospitalist Discharge Summary      Date of Admission:  11/9/2020  Date of Discharge:  11/13/2020  Discharging Provider: Mariel Knapp MD      Discharge Diagnoses   Mechanical Fall  Deconditioning  Rhabdomyolysis  Compression fracture of T8 vertebra  Compression fracture of L1 lumbar vertebra, chronic  Small bilateral pleural effusions  Hypothyroidism  Osteopenia       Follow-ups Needed After Discharge   Follow-up Appointments     Follow Up and recommended labs and tests      Follow up with Nursing home physician.  No follow up labs or test are   needed.         Follow-up and recommended labs and tests       6 [facility] with new xrays prior.    [phone] for appts.             Unresulted Labs Ordered in the Past 30 Days of this Admission     No orders found from 10/10/2020 to 11/10/2020.          Discharge Disposition   Discharged to short-term care facility  Condition at discharge: Stable      Hospital Course       Teresa Bates is a 89 year old female admitted on 11/9/2020. She presents after an unwitnessed fall at home.  She was likely on the floor for about 11 hours, between 9 PM yesterday at 8 AM today, before fall was discovered and she was brought to the emergency department for evaluation.  Laboratory studies are within normal limits with the exception of mild rhabdomyolysis.  Imaging studies show a probable acute compression fracture of T8, chronic compression fracture of L1, no pelvic or hip fractures, head CT with no acute changes. She is admitted for further evaluation and treatment.     Mechanical Fall  Deconditioning  Hx of falls  CT head negative for acute intracranial abnormality. Thoracic spine xray compression deformity of the T8 vertebral body. Acute fracture is not excluded. Lumbar xray chronic compression deformity of the L1 vertebral  body again noted. Fall appears to be mechanical. Patient reports she was walking  backwards in the kitchen to turn her walker to get to the den when she lost her balance and fell.   - PT/OT eval recommend TCU    Rhabdomyolysis  Secondary to fall and being on floor for a prolonged period of time. Renal function stable   - CK 1284--1365--845, no further need to trended. Managed with IVF.     Compression fracture of T8 vertebra  Compression fracture of L1 lumbar vertebra, chronic  MRI t spine- recent-appearing T8 compression fracture with moderate associated vertebral height loss and posterior buckling/retropulsion of the posterior vertebral cortex along the ventral aspect of the spinal  canal, resulting in mild spinal canal stenosis. MRI L spine-Old L1 compression deformity. See report for detail   - seen by neurosurgery, above images completed. Recommend TLSO brace when OOB  - f/u with [facility] in 6 wks   - schedule Tylenol 650mg QID, voltaren gel prn, Lidoderm patch  - Bowel regimen to prevent constipation    Small bilateral pleural effusions  Noted on mri of spine. Follow up CXR showed small effusions bilaterally. suspect this is secondary to IVF resuscitation she received due to rhabdo. She denies shortness of breath, chest pain or cough. TTE 8/16/20-LVEF 60-65%.  Received Lasix IV x 1 dose.  Lytes remain stable.     Hypothyroidism, unspecified type  TSH was slightly elevated on 9/15/2020 (12.03). Currently 7.79, FT4 1.09  - continue supplement without change  - recommend f/u TSH in 6 weeks in outpatient setting    Osteopenia  - Continue calcium and vitamin D supplements       Consultations This Hospital Stay   CARE MANAGEMENT / SOCIAL WORK IP CONSULT  PHYSICAL THERAPY ADULT IP CONSULT  OCCUPATIONAL THERAPY ADULT IP CONSULT  NEUROSURGERY IP CONSULT  ORTHOSIS BRACE IP CONSULT  PHYSICAL THERAPY ADULT IP CONSULT  OCCUPATIONAL THERAPY ADULT IP CONSULT    Code Status   No CPR- Do NOT Intubate    Time Spent on this Encounter   I, Mariel Knapp MD, personally saw the patient today  and spent 35 minutes discharging this patient.       Mariel Knapp MD  [place] [id number] [facility]  [address] [place] [place] [address]  Phone: [phone]  ______________________________________________________________________    Physical Exam   Vital Signs: Temp: 98  F (36.7  C) Temp src: Oral BP: 132/75 Pulse: 94   Resp: 16 SpO2: 95 % O2 Device: None (Room air)    Weight: 119 lbs .77 oz  General Appearance: Alert, awake and no apparent distress  Respiratory: clear to ausculation bilaterally, no wheezing  Cardiovascular: regular rate and rhythm  GI: soft and non-tender  Skin: warm and dry       Primary Care Physician   Shravan Bourgeois    Discharge Orders      XR Thoracic Spine 2 Views     Follow-up and recommended labs and tests     6 [facility] with new xrays prior.    [phone] for appts.     General info for SNF    Length of Stay Estimate: Short Term Care: Estimated # of Days <30  Condition at Discharge: Stable  Level of care:skilled   Rehabilitation Potential: Good  Admission H&P remains valid and up-to-date: Yes  Recent Chemotherapy: N/A  Use Nursing Home Standing Orders: Yes     Mantoux instructions    Give two-step Mantoux (PPD) Per Facility Policy Yes     Reason for your hospital stay    Had a fall and rhabdomyolysis. In addition,you have T8 compression fracture.     Follow Up and recommended labs and tests    Follow up with Nursing home physician.  No follow up labs or test are needed.     Activity - Up with assistive device     Activity - Up with nursing assistance     Additional Discharge Instructions    Patient should ware TLSO brace when out of bed and with activity.     No CPR- Do NOT Intubate     Physical Therapy Adult Consult    Evaluate and treat as clinically indicated.    Reason:  Deconditioning, T8 compression fracture     Occupational Therapy Adult Consult    Evaluate and treat as clinically indicated.    Reason:   Deconditioning, T8 compression fracture     Fall precautions     Advance Diet as Tolerated    Follow this diet upon discharge: Orders Placed This Encounter      Combination Diet Regular Diet Adult       Significant Results and Procedures   Most Recent 3 CBC's:  Recent Labs   Lab Test 11/11/20  0846 11/09/20  1116 08/16/20  1248   WBC 10.4 12.1* 10.3   HGB 12.4 13.4 12.1   * 102* 101*    153 228     Most Recent 3 BMP's:  Recent Labs   Lab Test 11/13/20  0620 11/12/20  1750 11/11/20  1551 11/11/20  0846 11/10/20  1229 11/09/20  1116     --   --  139  --  142   POTASSIUM 4.0 3.3* 3.5 3.2*  --  3.7   CHLORIDE 107  --   --  108  --  111*   CO2 26  --   --  28  --  28   BUN 14  --   --  19  --  23   CR 0.47*  --   --  0.59 0.49* 0.60   ANIONGAP 4  --   --  3  --  3   AMAN 7.7*  --   --  7.8*  --  8.8   GLC 93  --   --  86  --  128*   ,   Results for orders placed or performed during the hospital encounter of 11/09/20   Lumbar spine XR, 2-3 views    Narrative    LUMBAR SPINE TWO TO THREE VIEWS 11/9/2020 11:49 AM     COMPARISON: Chest CT 8/15/2020    HISTORY: Fall.      Impression    IMPRESSION: Moderate chronic compression deformity of the L1 vertebral  body again noted. Vertebral body heights and contours of the lumbar  spine are otherwise normal. No new fractures. There is degenerative  grade 1 anterolisthesis of L4 upon L5. Alignment of the lumbar  vertebrae is otherwise normal. There is degenerative endplate spurring  at L2-L3 and L4-L5. There is facet arthropathy at the L3-L4, L4-L5 and  L5-S1 levels.    TONYA CONNOR MD   XR Pelvis 1/2 Views    Narrative    PELVIS ONE TO TWO VIEWS  11/9/2020 11:59 AM     HISTORY:  Fall.    FINDINGS: Lower lumbar spine degenerative change. Osteopenia. Austyn-pin  fixation of an old proximal left femur fracture.      Impression    IMPRESSION: No acute fracture identified.    SPEEDY LORD MD   Head CT w/o contrast    Narrative    CT OF THE HEAD WITHOUT CONTRAST 11/9/2020  11:34 AM     COMPARISON: Head CT 8/15/2020    HISTORY: Fall.    TECHNIQUE: 5 mm thick axial CT images of the head were acquired  without IV contrast material.    FINDINGS:  There is moderate diffuse cerebral volume loss. There are  extensive confluent areas of decreased density in the cerebral white  matter bilaterally that are consistent with sequela of chronic small  vessel ischemic disease.     The ventricles and basal cisterns are within normal limits in  configuration given the degree of cerebral volume loss.  There is no  midline shift. There are no extra-axial fluid collections.     No intracranial hemorrhage, mass or recent infarct.    The visualized paranasal sinuses are well-aerated. There is no  mastoiditis. There are no fractures of the visualized bones.       Impression    IMPRESSION:  Diffuse cerebral volume loss and cerebral white matter  changes consistent with chronic small vessel ischemic disease. No  evidence for acute intracranial pathology.      Radiation dose for this scan was reduced using automated exposure  control, adjustment of the mA and/or kV according to patient size, or  iterative reconstruction technique.    TONYA CONNOR MD   XR Thoracic Spine 3 Views    Narrative    THORACIC SPINE THREE VIEWS  11/9/2020 11:59 AM     COMPARISON: Chest CT 8/15/2020    HISTORY: Fall.      Impression    IMPRESSION: There has been interval development of moderate  compression deformity of the T8 vertebral body. Acute fracture is not  excluded. Vertebral body heights of the thoracic spine otherwise  appear within normal limits. Alignment of the thoracic vertebrae is  normal.    TONYA CONNOR MD   MR Thoracic Spine w/o Contrast    Narrative    MRI THORACIC SPINE WITHOUT CONTRAST  11/11/2020 3:17 PM     HISTORY: T8 compression fracture.    TECHNIQUE: Multiplanar multisequence MRI of the thoracic spine without  contrast.    COMPARISON: Thoracic spine radiographs dated 11/9/2020.    FINDINGS:  There is a  recent-appearing moderate T8 compression fracture with up  to approximately 30-35% associated vertebral height loss. There is  posterior buckling/mild retropulsion of the posterior T8 vertebral  body cortex along the ventral aspect of the spinal canal, resulting in  effacement of the ventral thecal sac and minimal mass effect on the  ventral aspect of the cord. There is mild spinal canal stenosis at the  level of the T8 fracture.    No other acute fracture identified. Mild chronic-appearing concavity  of the T1 and T3 superior endplates and chronic mild L1 superior  endplate compression fracture, with mild retropulsion of the  posterior-superior L1 fracture fragment, mildly indenting the ventral  aspect of the thecal sac at that level. There is no high-grade spinal  canal stenosis. There is a normal appearance of the spinal cord. There  is mild multilevel degenerative disc disease. Mild to moderate  multilevel facet arthrosis. Moderate bordering on moderate to severe  left T8-T9 neural foraminal stenosis in the setting of prominent  posterior endplate and facet hypertrophy. No high-grade neural  foraminal narrowing seen elsewhere in the thoracic spine. Multilevel  perineural cysts in the neural foramina, the most prominent of which  is seen in the left T6-T7 neural foramen (series 17 image 12).    Multilevel degenerative changes of the cervical spine are partially  visualized on the localizer images, including at least moderate spinal  canal stenosis at multiple levels. Bilateral small to medium sized  pleural effusions are present. There is mild nonspecific subcutaneous  edema overlying the mid to lower thoracic region. Otherwise  unremarkable paraspinous soft tissues.      Impression    IMPRESSION:  1. Recent-appearing T8 compression fracture with moderate associated  vertebral height loss and posterior buckling/retropulsion of the  posterior vertebral cortex along the ventral aspect of the spinal  canal,  resulting in mild spinal canal stenosis.  2. Multiple chronic thoracic and upper lumbar compression deformities,  as detailed.  3. Moderate bordering on moderate to severe left T8-T9 neural  foraminal stenosis, with lesser degrees of neural foraminal narrowing  elsewhere. No high-grade spinal canal stenosis.  4. Bilateral pleural effusions.    GAMALIEL PIERRE MD   MR Lumbar Spine w/o Contrast    Narrative    MR LUMBAR SPINE WITHOUT CONTRAST 11/11/2020 3:38 PM     HISTORY: Chronic L1 compression fracture, generalized leg weakness.    TECHNIQUE: Multiplanar multisequence images were obtained through the  lumbar spine without contrast.    COMPARISON: 11/9/2020 plain radiograph.    FINDINGS: Five lumbar type vertebral bodies are assumed. There is an  L1 compression deformity with anterior wedging. This vertebral body  has some artifact over it. There is no definite acute abnormal signal  in the L1 vertebral body. Remainder of the lumbar vertebral bodies and  visualized T12 vertebral body are preserved in height. There is  retrolisthesis at L2-L3 and degenerative spondylolisthesis at L4-L5  measuring approximately 3-4 mm a piece. Posterior alignment is  otherwise normal. The conus medullaris is normal in appearance with  its tip at the L1 vertebral segment. Visualized paraspinal soft  tissues and bony pelvis are normal.    T12-L1: Minimal disc bulge and facet hypertrophy. No stenosis.    L1-L2: Minimal disc bulge and facet hypertrophy. No stenosis.    L2-L3: Retrolisthesis, posterior osteophyte formation and facet  hypertrophy is present causing mild central canal stenosis but no  significant neural foraminal stenosis.    L3-L4: Broad-based disc bulge and mild to moderate facet and  ligamentum flavum hypertrophy is present causing some mild to moderate  bilateral neural foraminal stenosis and mild central canal stenosis.    L4-L5: Degenerative spondylolisthesis is present measuring  approximately 4 mm. There is also  moderate to severe facet  hypertrophy. There is secondary moderate bilateral neural foraminal  stenosis and mild central canal stenosis.    L5-S1: Minimal disc bulging and mild to moderate facet and ligamentum  flavum hypertrophy is present but there is no significant stenosis.      Impression    IMPRESSION:  1. Old L1 compression deformity. No evidence for any acute fracture.  2. Multilevel degenerative disc and facet disease most advanced at  L4-L5 where there is degenerative spondylolisthesis resulting in  moderate bilateral neural foraminal stenosis and mild central canal  stenosis. There is also mild to moderate bilateral neural foraminal  stenosis and mild central canal stenosis at L3-L4 as well as mild  central canal stenosis at L2-L3.    SAMY RAGLAND MD   XR Chest Port 1 View    Narrative    CHEST PORTABLE ONE VIEW   11/12/2020 8:50 AM     HISTORY: Evaluate pleural effusion noted on MRI of spine.    COMPARISON: Chest CTA on 8/15/2020      Impression    IMPRESSION: Single portable AP view of the chest was obtained. Mild  enlargement of the cardiac silhouette. Atherosclerotic vascular  calcification of the aortic knob. Small bilateral pleural effusions  and associated basilar atelectasis/consolidation. No significant  pneumothorax.    AARON VILLA MD       Discharge Medications   Current Discharge Medication List      START taking these medications    Details   !! acetaminophen (TYLENOL) 325 MG tablet Take 1 tablet (325 mg) by mouth every 6 hours as needed for mild pain  Qty:      Associated Diagnoses: Compression fracture of T8 vertebra, initial encounter (H)      !! acetaminophen (TYLENOL) 325 MG tablet Take 2 tablets (650 mg) by mouth 4 times daily for 7 days    Associated Diagnoses: Compression fracture of T8 vertebra, initial encounter (H)      diclofenac (VOLTAREN) 1 % topical gel Apply 2 g topically 4 times daily as needed for moderate pain  Qty:      Associated Diagnoses: Compression fracture of  T8 vertebra, initial encounter (H)      Lidocaine (LIDOCARE) 4 % Patch Place 1 patch onto the skin every 24 hours To prevent lidocaine toxicity, patient should be patch free for 12 hrs daily. Patch should be removed at night.  Qty: 7 patch    Associated Diagnoses: Compression fracture of T8 vertebra, initial encounter (H)       !! - Potential duplicate medications found. Please discuss with provider.      CONTINUE these medications which have NOT CHANGED    Details   calcium-vitamin D (CALTRATE) 600-400 MG-UNIT per tablet Take 1 tablet by mouth daily (before lunch)       ibuprofen (ADVIL/MOTRIN) 200 MG tablet Take 200 mg by mouth every morning      levothyroxine (SYNTHROID/LEVOTHROID) 75 MCG tablet Take 1 tablet (75 mcg) by mouth daily  Qty: 90 tablet, Refills: 3    Comments: Patient wishes to pay out of pocket for this drug  Associated Diagnoses: Hypothyroidism, unspecified type      Multiple Vitamins-Minerals (CENTRUM SILVER 50+WOMEN PO) Take 1 tablet by mouth daily (before lunch)       polyethylene glycol (MIRALAX) 17 g packet Take 17 g by mouth daily as needed for constipation  Qty:      Associated Diagnoses: Constipation, unspecified constipation type           Allergies   No Known Allergies

## 2020-11-13 NOTE — PROGRESS NOTES
Care Management Discharge Note    Discharge Date: 11/13/20(tcu)  Expected Time of Departure: via [facility] at 1315    Discharge Disposition: Transitional Care    Discharge Services:  TCU    Discharge DME:  No    Discharge Transportation: family or friend will provide    Private pay costs discussed: transportation costs    PAS Confirmation Code: [id number]  Patient/family educated on Medicare website which has current facility and service quality ratings: yes    Education Provided on the Discharge Plan:    Persons Notified of Discharge Plans: pt, son, RN, MD, facility  Patient/Family in Agreement with the Plan: yes    Handoff Referral Completed: No    Additional Information:  Pt will discharge today to [facility]. Orders faxed.         RENNY Wise, LG  [phone]  [facility]

## 2020-11-13 NOTE — PLAN OF CARE
DATE & TIME: 11/13/20 4532-3394    Cognitive Concerns/ Orientation : Orientedx4, forgetful   BEHAVIOR & AGGRESSION TOOL COLOR: green   ABNL VS/O2: VSS, on RA  MOBILITY: Up with 2 GB+walker. Turn and repo while in bed q2h. Very weak and immobile.   PAIN MANAGMENT: Back pain secondary to compression fracture. Scheduled tylenol and advil. Still significant pain with movement.  DIET: Regular, good appetite.  BOWEL/BLADDER: Incontinent of urine, purewick in use.   ABNL LAB/BG: Potassium 3.3, replaced PO, recheck in AM. CK elevated but trending down.   DRAIN/DEVICES: PIV saline locked.  SKIN: Bruising. Blanchable redness on coccyx. Encourage repositioning as patient allows. Mepilex to left shoulder and left elbow from fall, CDI.   TESTS/PROCEDURES: NA  D/C DAY/GOALS/PLACE: Anticipate discharge to [facility] in [place] [facility] today, ride to be set up by FERMÍN.  OTHER IMPORTANT INFO: Neurosurgery following. Orthosis brace to use on back when patient OOB. Meds crushed in applesauce. Sub-Q Lovenox.

## 2020-11-13 NOTE — PLAN OF CARE
DATE & TIME: 11/12/20 0417-6201    Cognitive Concerns/ Orientation : Orientedx4, forgetful   BEHAVIOR & AGGRESSION TOOL COLOR: green   ABNL VS/O2: VSS, on RA  MOBILITY: Up with 2 GB+walker. Turn and repo while in bed q2h. Very weak and immobile.   PAIN MANAGMENT: Back pain secondary to compression fracture. Scheduled tylenol and advil. Still significant pain with movement.  DIET: Regular, good appetite.  BOWEL/BLADDER: Incontinent of urine, purewick in use.   ABNL LAB/BG: Potassium 3.3, replaced PO, recheck in AM. CK elevated but trending down.   DRAIN/DEVICES: PIV saline locked.  SKIN: Bruising. Blanchable redness on coccyx. Encourage repositioning as patient allows. Mepilex to left shoulder and left elbow from fall, CDI.   TESTS/PROCEDURES: NA  D/C DAY/GOALS/PLACE: [facility], ride to be set up by FERMÍN.  OTHER IMPORTANT INFO: Neurosurgery following. Orthosis brace to use on back when patient OOB. Meds crush in applesauce. Sub-Q Lovenox. Nursing will continue to monitor.

## 2020-11-13 NOTE — PLAN OF CARE
Occupational Therapy Discharge Summary    Reason for therapy discharge:    Discharged to transitional care facility.    Progress towards therapy goal(s). See goals on Care Plan in [facility] electronic health record for goal details.  Goals not met.  Barriers to achieving goals:   discharge from facility.    Therapy recommendation(s):    Continued therapy is recommended.  Rationale/Recommendations:  at TCU to improve ind/safety w/ ADL's and mobility.

## 2020-11-14 ASSESSMENT — MIFFLIN-ST. JEOR: SCORE: 940.71

## 2020-11-16 ENCOUNTER — NURSING HOME VISIT (OUTPATIENT)
Dept: GERIATRICS | Facility: CLINIC | Age: 85
End: 2020-11-16
Payer: MEDICARE

## 2020-11-16 ENCOUNTER — PATIENT OUTREACH (OUTPATIENT)
Dept: CARE COORDINATION | Facility: CLINIC | Age: 85
End: 2020-11-16

## 2020-11-16 VITALS
DIASTOLIC BLOOD PRESSURE: 68 MMHG | TEMPERATURE: 98.2 F | HEIGHT: 62 IN | HEART RATE: 85 BPM | OXYGEN SATURATION: 94 % | WEIGHT: 124 LBS | SYSTOLIC BLOOD PRESSURE: 118 MMHG | BODY MASS INDEX: 22.82 KG/M2 | RESPIRATION RATE: 18 BRPM

## 2020-11-16 DIAGNOSIS — E03.9 HYPOTHYROIDISM, UNSPECIFIED TYPE: ICD-10-CM

## 2020-11-16 DIAGNOSIS — S32.010D COMPRESSION FRACTURE OF L1 VERTEBRA WITH ROUTINE HEALING, SUBSEQUENT ENCOUNTER: ICD-10-CM

## 2020-11-16 DIAGNOSIS — R53.81 PHYSICAL DECONDITIONING: ICD-10-CM

## 2020-11-16 DIAGNOSIS — S22.060D COMPRESSION FRACTURE OF T8 VERTEBRA WITH ROUTINE HEALING, SUBSEQUENT ENCOUNTER: ICD-10-CM

## 2020-11-16 DIAGNOSIS — N18.1 CKD (CHRONIC KIDNEY DISEASE) STAGE 1, GFR 90 ML/MIN OR GREATER: ICD-10-CM
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
## 2020-11-16 DIAGNOSIS — K59.00 CONSTIPATION, UNSPECIFIED CONSTIPATION TYPE: ICD-10-CM

## 2020-11-16 DIAGNOSIS — Z71.89 ADVANCED DIRECTIVES, COUNSELING/DISCUSSION: ICD-10-CM

## 2020-11-16 DIAGNOSIS — T79.6XXD TRAUMATIC RHABDOMYOLYSIS, SUBSEQUENT ENCOUNTER: ICD-10-CM

## 2020-11-16 DIAGNOSIS — M85.80 OSTEOPENIA, UNSPECIFIED LOCATION: ICD-10-CM

## 2020-11-16 PROCEDURE — 99310 SBSQ NF CARE HIGH MDM 45: CPT | Performed by: NURSE PRACTITIONER

## 2020-11-16 ASSESSMENT — ACTIVITIES OF DAILY LIVING (ADL): DEPENDENT_IADLS:: SHOPPING;TRANSPORTATION

## 2020-11-16 NOTE — PROGRESS NOTES
Clinic Care Coordination Contact  Care Coordination Transition Communication    Referral Source: IP Report    Clinical Data: [facility]  Hospitalist Discharge Summary       Date of Admission:  11/9/2020  Date of Discharge:  11/13/2020  Discharging Provider: Mariel Knapp MD     Discharge Diagnoses     Mechanical Fall  Deconditioning  Rhabdomyolysis  Compression fracture of T8 vertebra  Compression fracture of L1 lumbar vertebra, chronic  Small bilateral pleural effusions  Hypothyroidism  Osteopenia     Transition to Facility:              Facility Name: [facility]              Contact name and phone number/fax: [phone]/ [phone]    Plan: RN/SW Care Coordinator will await notification from facility staff informing RN/SW Care Coordinator of patient's discharge plans/needs. RN/SW Care Coordinator will review chart and outreach to facility staff every 4 weeks and as needed. Fax sent to TCU with my contact information requesting notification upon discharge.    RENNY Deshpande, LGSW  Clinic Care Coordinator  [facility]  [phone]  [email or web address]

## 2020-11-16 NOTE — LETTER
[facility]   To:   [facility] in [place]          Please give to facility    From:   FERMÍN Deshpande Care Coordinator [facility]     Patient Name:  Teresa Bates YOB: 1931   Admit date: 11/13/2020      *Information Needed:  Please contact me when the patient will discharge (or if they will move to long term care)- include the discharge date, disposition, and main diagnosis   - If the patient is discharged with home care services, please provide the name of the agency    Also- Please inform me if a care conference is being held.   Phone, Fax or Email with information       Thank You,   JOSE Deshpande, MSW  Clinic Care Coordinator  [facility]  Ph: [phone]  [email or web address]

## 2020-11-16 NOTE — PROGRESS NOTES
[facility]  PRIMARY CARE PROVIDER AND CLINIC:  Shravan Bourgeois MD, [address] / [place] [address]  Chief Complaint   Patient presents with     Establish Care     [facility] Record Number:  [id number]  Place of Service where encounter took place:  [facility] ([facility]) [[id number]]    Teresa Bates  is a 89 year old  (10/9/1931), admitted to the above facility from  [facility]. Hospital stay 11/9/20 through 11/13/20..  Admitted to this facility for  rehab, medical management and nursing care.     Fall, sequela  Compression fracture of T8 vertebra with routine healing, subsequent encounter  Compression fracture of L1 vertebra with routine healing, subsequent encounter  Traumatic rhabdomyolysis, subsequent encounter  Osteopenia, unspecified location  Physical deconditioning  Hypothyroidism, unspecified type  CKD (chronic kidney disease) stage 1, GFR 90 ml/min or greater  Constipation, unspecified constipation type  Advanced directives, counseling/discussion    HPI:    HPI information obtained from: facility chart records, facility staff, patient report and [facility] chart review.     Brief Summary of Hospital Course:  Pt lives alone and had d fall at home. She was walking backwards in the kitchen with her walker and lost her balance.. She was on the floor about 1 hr and was found by her son in law.   Sent to er--workup showed Rhabdomyolysis and a likely acute T8 fracture.  Also noted was a L1 fracture that appears to be chronic in nature. She was put on scheduled Tylenol, Voltaren and lidocaine patch.    TODAY DURING EXAM/ROS:  No CP, SOB, Cough, dizziness, fevers, chills, HA, N/V, dysuria. Feels a little constipated. Appetite is down.  No pain except mid back and down right sie of lower back. Shins tender at baseline.      CODE STATUS/ADVANCE DIRECTIVES DISCUSSION:   Full Code--d/w pt today    Patient's living condition: lives  alone  ALLERGIES: Patient has no known allergies.  PAST MEDICAL HISTORY:  has a past medical history of Acoustic neuroma (H) (8/24/2016), Hypothyroidism, unspecified type (8/24/2016), and Osteopenia (8/24/2016).  PAST SURGICAL HISTORY:   has a past surgical history that includes Stereotactic radiation to acustic neuroma on left with Dr. Galindo (Left, 05/10/200).  FAMILY HISTORY: family history includes Family History Negative in her mother; Multiple Sclerosis in her daughter; Myocardial Infarction in her father; Osteoporosis in her daughter.  SOCIAL HISTORY:   reports that she has quit smoking. Her smoking use included cigarettes. She has a 30.00 pack-year smoking history. She has never used smokeless tobacco. She reports that she does not drink alcohol or use drugs.    Post Discharge Medication Reconciliation Status: discharge medications reconciled and changed, per note/orders     Current Outpatient Medications   Medication Sig Dispense Refill     acetaminophen (TYLENOL) 325 MG tablet Take 1 tablet (325 mg) by mouth every 6 hours as needed for mild pain       acetaminophen (TYLENOL) 325 MG tablet Take 2 tablets (650 mg) by mouth 4 times daily for 7 days (Patient taking differently: Take 650 mg by mouth 4 times daily End date 11/20/20.)       bisacodyl (DULCOLAX) 10 MG suppository Place 10 mg rectally daily as needed for constipation       calcium-vitamin D (CALTRATE) 600-400 MG-UNIT per tablet Take 1 tablet by mouth daily (before lunch)        diclofenac (VOLTAREN) 1 % topical gel Apply 2 g topically 4 times daily as needed for moderate pain       ibuprofen (ADVIL/MOTRIN) 200 MG tablet Take 200 mg by mouth every morning       levothyroxine (SYNTHROID/LEVOTHROID) 75 MCG tablet Take 1 tablet (75 mcg) by mouth daily 90 tablet 3     Lidocaine (LIDOCARE) 4 % Patch Place 1 patch onto the skin every 24 hours To prevent lidocaine toxicity, patient should be patch free for 12 hrs daily. Patch should be removed at night.  "7 patch      Multiple Vitamins-Minerals (CENTRUM SILVER 50+WOMEN PO) Take 1 tablet by mouth daily (before lunch)        polyethylene glycol (MIRALAX) 17 g packet Take 17 g by mouth daily as needed for constipation          ROS:see above under HPI    Vitals:  /68   Pulse 85   Temp 98.2  F (36.8  C)   Resp 18   Ht 1.575 m (5' 2\")   Wt 56.2 kg (124 lb)   SpO2 94%   BMI 22.68 kg/m    Exam:  GENERAL APPEARANCE:  Alert, in no distress, oriented, cooperative  ENT:  Mouth and posterior oropharynx normal, moist mucous membranes, normal hearing acuity  EYES:  EOM, conjunctivae, lids, pupils and irises normal  NECK:  No adenopathy,masses or thyromegaly  RESP:  respiratory effort and palpation of chest normal, lungs clear to auscultation , no respiratory distress, diminished breath sounds    CV:  Palpation and auscultation of heart done , regular rate and rhythm, no murmur, rub, or gallop, no edema, +1 pedal pulses/warm feet  ABDOMEN:  normal bowel sounds, soft, nontender, no hepatosplenomegaly or other masses  M/S:   seen in bed, REYNA equally  SKIN:  Inspection of skin and subcutaneous tissue baseline, except PVD changes in shins and dry skin. trace LE edema Left > Right  NEURO:   Cranial nerves 2-12 are normal tested and grossly at patient's baseline  PSYCH:  oriented X 3, normal insight, judgement and memory, anxious    Lab/Diagnostic data:  Recent Labs   Lab Test 11/13/20  0620 11/12/20  1750 11/11/20  0846     --  139   POTASSIUM 4.0 3.3* 3.2*   CHLORIDE 107  --  108   CO2 26  --  28   ANIONGAP 4  --  3   GLC 93  --  86   BUN 14  --  19   CR 0.47*  --  0.59   AMAN 7.7*  --  7.8*    < > = values in this interval not displayed.     CBC RESULTS:   Recent Labs   Lab Test 11/11/20  0846   WBC 10.4   RBC 3.72*   HGB 12.4   HCT 37.8   *   MCH 33.3*   MCHC 32.8   RDW 14.0          ASSESSMENT / PLAN:  (W19.XXXS) Fall, sequela  (primary encounter diagnosis)  (S22.060D) Compression fracture of T8 " "vertebra with routine healing, subsequent encounter  (S32.010D) Compression fracture of L1 vertebra with routine healing, subsequent encounter  (T79.6XXD) Traumatic rhabdomyolysis, subsequent encounter  (M85.80) Osteopenia, unspecified location  Comment/Plan: cont pain meds, f/u ortho spine clinic 6 weeks for f/u and xrays, monitor. Add Vit D to labs on 11/19    (R53.81) Physical deconditioning  Comment/Plan: PT OT    (E03.9) Hypothyroidism, unspecified type  Comment/Plan: recent  T4 ok but TSH high--check in 4-6 weeks    (N18.1) CKD (chronic kidney disease) stage 1, GFR 90 ml/min or greater  Comment/Plan: check bmp on 11/19    (K59.00) Constipation, unspecified constipation type  Comment/Plan: add dulcolax supp, cont other meds, prune juice, monitor.      (Z71.89) Advanced directives, counseling/discussion  Comment/Plan: 2 long discussions with pt and then son in law.  Son in law did not want to discuss \"Go ask her, she can talk about it\". When I told him that pt wanted him to discuss, he again said we need to d/w her.   Discussion and explanations to pts seemed to cause anxiety and she said, \"I have a living will\".  And \"I don't know what to do but want a chance\".  She said, \"what would you do?\"   I told her with nurse present that if she is unsure or would like a chance if her heart stops, she may wish to be FULL CODE.  \"Yes that is what I want\", she said.   GORDO signed, sent to epic.        Total time spent with patient visit at the skilled nursing facility was 70 including patient visit, review of past records, phone call to patient contact-son in law-15 mins. Greater than 50% of total time spent with counseling and coordinating care due to  Two conversations with pt and RN re: POLST and Advanced Directives(22 mins).     Electronically signed by:  OSWALDO Oliveros CNP                     "

## 2020-11-17 ENCOUNTER — DOCUMENTATION ONLY (OUTPATIENT)
Dept: OTHER | Facility: CLINIC | Age: 85
End: 2020-11-17

## 2020-11-17 PROBLEM — K59.00 CONSTIPATION, UNSPECIFIED CONSTIPATION TYPE: Status: ACTIVE | Noted: 2020-11-17

## 2020-11-17 PROBLEM — N18.1 CKD (CHRONIC KIDNEY DISEASE) STAGE 1, GFR 90 ML/MIN OR GREATER: Status: ACTIVE | Noted: 2020-11-17

## 2020-11-17 PROBLEM — R53.81 PHYSICAL DECONDITIONING: Status: ACTIVE | Noted: 2020-11-17

## 2020-11-17 PROBLEM — J90 BILATERAL PLEURAL EFFUSION: Status: ACTIVE | Noted: 2020-11-17

## 2020-11-17 PROBLEM — Z71.89 ADVANCED DIRECTIVES, COUNSELING/DISCUSSION: Status: ACTIVE | Noted: 2020-11-17

## 2020-11-17 RX ORDER — BISACODYL 10 MG
10 SUPPOSITORY, RECTAL RECTAL DAILY PRN
COMMUNITY
Start: 2020-11-16 | End: 2020-12-14

## 2020-11-18 ENCOUNTER — RECORDS - HEALTHEAST (OUTPATIENT)
Dept: LAB | Facility: CLINIC | Age: 85
End: 2020-11-18

## 2020-11-19 ENCOUNTER — NURSING HOME VISIT (OUTPATIENT)
Dept: GERIATRICS | Facility: CLINIC | Age: 85
End: 2020-11-19
Payer: MEDICARE

## 2020-11-19 ENCOUNTER — TRANSFERRED RECORDS (OUTPATIENT)
Dept: HEALTH INFORMATION MANAGEMENT | Facility: CLINIC | Age: 85
End: 2020-11-19

## 2020-11-19 VITALS
SYSTOLIC BLOOD PRESSURE: 113 MMHG | DIASTOLIC BLOOD PRESSURE: 62 MMHG | BODY MASS INDEX: 22.63 KG/M2 | TEMPERATURE: 97.7 F | WEIGHT: 123 LBS | RESPIRATION RATE: 18 BRPM | HEART RATE: 74 BPM | OXYGEN SATURATION: 95 % | HEIGHT: 62 IN
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
## 2020-11-19 DIAGNOSIS — E03.9 HYPOTHYROIDISM, UNSPECIFIED TYPE: ICD-10-CM

## 2020-11-19 DIAGNOSIS — T79.6XXD TRAUMATIC RHABDOMYOLYSIS, SUBSEQUENT ENCOUNTER: ICD-10-CM

## 2020-11-19 DIAGNOSIS — S22.060D COMPRESSION FRACTURE OF T8 VERTEBRA WITH ROUTINE HEALING, SUBSEQUENT ENCOUNTER: Primary | ICD-10-CM

## 2020-11-19 DIAGNOSIS — S32.010D COMPRESSION FRACTURE OF L1 VERTEBRA WITH ROUTINE HEALING, SUBSEQUENT ENCOUNTER: ICD-10-CM

## 2020-11-19 DIAGNOSIS — R53.81 PHYSICAL DECONDITIONING: ICD-10-CM

## 2020-11-19 LAB
ANION GAP SERPL CALCULATED.3IONS-SCNC: 5 MMOL/L (ref 5–18)
ANION GAP SERPL CALCULATED.3IONS-SCNC: 5 MMOL/L (ref 5–18)
BUN SERPL-MCNC: 15 MG/DL (ref 8–28)
BUN SERPL-MCNC: 15 MG/DL (ref 8–28)
CALCIUM SERPL-MCNC: 8.7 MG/DL (ref 8.5–10.5)
CALCIUM SERPL-MCNC: 8.7 MG/DL (ref 8.5–10.5)
CHLORIDE BLD-SCNC: 102 MMOL/L (ref 98–107)
CHLORIDE SERPLBLD-SCNC: 102 MMOL/L (ref 98–107)
CO2 SERPL-SCNC: 30 MMOL/L (ref 22–31)
CO2 SERPL-SCNC: 30 MMOL/L (ref 22–31)
CREAT SERPL-MCNC: 0.62 MG/DL (ref 0.6–1.1)
CREAT SERPL-MCNC: 0.62 MG/DL (ref 0.6–1.1)
GFR SERPL CREATININE-BSD FRML MDRD: >60 ML/MIN/1.73M2
GFR SERPL CREATININE-BSD FRML MDRD: >60 ML/MIN/1.73M2
GLUCOSE BLD-MCNC: 105 MG/DL (ref 70–125)
GLUCOSE SERPL-MCNC: 105 MG/DL (ref 70–125)
POTASSIUM BLD-SCNC: 4 MMOL/L (ref 3.5–5)
POTASSIUM SERPL-SCNC: 4 MMOL/L (ref 3.5–5)
SODIUM SERPL-SCNC: 137 MMOL/L (ref 136–145)
SODIUM SERPL-SCNC: 137 MMOL/L (ref 136–145)

## 2020-11-19 PROCEDURE — 99305 1ST NF CARE MODERATE MDM 35: CPT | Performed by: INTERNAL MEDICINE

## 2020-11-19 ASSESSMENT — MIFFLIN-ST. JEOR: SCORE: 936.17

## 2020-11-19 NOTE — PROGRESS NOTES
"Teresa Bates is a 89 year old female seen November 19, 2020 at [facility] where she was admitted after [facility] hospitalization 11/9-13 following a fall at home.   Pt had been down about 11 hours and had mild rhabdomyolysis with CK peak 1284   Imaging showed a new T8 compression fracture, and older L1 compression fracture.   She was fitted for a TLSO, and pain managed.   Also noted to have small bilateral pleural effusions thought to be secondary to IVF given for rhabdo.  Not symptomatic, ECHO in August showed LVEF 60-65% and mild-mod aortic stenosis.    She was treated with a single dose of IV furosemide.     Pt has a h/o dizziness and falls, admitted in August again after being found down and with mild rhabdomyolysis.   She has a h/o acoustic neuroma, s/p remote stereotactic radiation.   Chronic dizziness.   Today patient is seen in her room up to chair.   She persistently reports that her back brace is \"too tight.  I can't take a deep breath.\"   It is causing discomfort around the costal margins bilaterally.   Brace is not on tightly, is actually fairly loose, but patient cannot be dissuaded.  She is able to ambulate to the BR with FWW, but it is \"a lot of effort.\"          Past Medical History:   Diagnosis Date     Acoustic neuroma (H) 8/24/2016     Hypothyroidism, unspecified type 8/24/2016     Osteopenia 8/24/2016       Past Surgical History:   Procedure Laterality Date     Stereotactic radiation to acustic neuroma on left with Dr. Galindo Left 05/10/200       Family History   Problem Relation Age of Onset     Family History Negative Mother      Myocardial Infarction Father      Multiple Sclerosis Daughter      Osteoporosis Daughter        Social History     Tobacco Use     Smoking status: Former Smoker     Packs/day: 1.00     Years: 30.00     Pack years: 30.00     Types: Cigarettes     Smokeless tobacco: Never Used   Substance Use Topics     Alcohol use: No     Alcohol/week: " 0.0 standard drinks      SH: Lives alone, [place] apartment in [place].   She has a son Sohail.      Review Of Systems  Skin: abrasion left elbow  Eyes: impaired vision  Ears/Nose/Throat: hearing loss  Respiratory: No shortness of breath, dyspnea on exertion, cough, or hemoptysis  Cardiovascular: as above  Gastrointestinal: negative  Genitourinary: negative  Musculoskeletal: frequent falls, ambulatory with walker at home  Neurologic: BIMS 11/15, chronic dizziness  Psychiatric: negative  Hematologic/Lymphatic/Immunologic: negative  Endocrine: hypothyroidism     Last Comprehensive Metabolic Panel:  Sodium   Date Value Ref Range Status   11/19/2020 137 136 - 145 mmol/L Final     Potassium   Date Value Ref Range Status   11/19/2020 4.0 3.5 - 5.0 mmol/L Final     Chloride   Date Value Ref Range Status   11/19/2020 102 98 - 107 mmol/L Final     Carbon Dioxide   Date Value Ref Range Status   11/19/2020 30 22 - 31 mmol/L Final     Anion Gap   Date Value Ref Range Status   11/19/2020 5 5 - 18 mmol/L Final     Glucose   Date Value Ref Range Status   11/19/2020 105 70 - 125 mg/dL Final     Urea Nitrogen   Date Value Ref Range Status   11/19/2020 15 8 - 28 mg/dL Final     Creatinine   Date Value Ref Range Status   11/19/2020 0.62 0.60 - 1.10 mg/dL Final     GFR Estimate   Date Value Ref Range Status   11/19/2020 >60 >60 ml/min/1.73m2 Final     Calcium   Date Value Ref Range Status   11/19/2020 8.7 8.5 - 10.5 mg/dL Final     Lab Results   Component Value Date    WBC 10.4 11/11/2020      HGB 12.4 11/11/2020       11/11/2020       11/11/2020     MRI L spine 11/9/2020     IMPRESSION:  1. Old L1 compression deformity. No evidence for any acute fracture.  2. Multilevel degenerative disc and facet disease most advanced at  L4-L5 where there is degenerative spondylolisthesis resulting in  moderate bilateral neural foraminal stenosis and mild central canal  stenosis. There is also mild to moderate bilateral neural  foraminal  stenosis and mild central canal stenosis at L3-L4 as well as mild  central canal stenosis at L2-L3.                   MRI T spine 11/9/2020     IMPRESSION:  1. Recent-appearing T8 compression fracture with moderate associated  vertebral height loss and posterior buckling/retropulsion of the  posterior vertebral cortex along the ventral aspect of the spinal canal, resulting in mild spinal canal stenosis.  2. Multiple chronic thoracic and upper lumbar compression deformities, as detailed.  3. Moderate bordering on moderate to severe left T8-T9 neural  foraminal stenosis, with lesser degrees of neural foraminal narrowing elsewhere. No high-grade spinal canal stenosis.  4. Bilateral pleural effusions.    Head CT 11/9/2020  IMPRESSION:  Diffuse cerebral volume loss and cerebral white matter  changes consistent with chronic small vessel ischemic disease. No evidence for acute intracranial pathology.       IMP/PLAN:   (S22.060D) Compression fracture of T8 vertebra with routine healing, subsequent encounter    (S32.010D) Compression fracture of L1 vertebra with routine healing, subsequent encounter  Comment: suspect feeling of TLSO being too tight is related to pain from her fractures  Plan: will change acetaminophen from prn to scheduled tid; remains on once daily ibuprofen 200 mg as well   Continue Voltaren gel and lidocaine patch     Calcium / vit D daily     (W19.XXXS) Fall, sequela  (T79.6XXD) Traumatic rhabdomyolysis, subsequent encounter   (R53.81) Physical deconditioning  Comment: treated with IVF and CK normalized  Plan: PHYSICAL THERAPY / OCCUPATIONAL THERAPY for balance, gait, strengthening and ADLs.   Her stated [place]  However with falls and down for long periods of time x2, she may need a higher level of support.       (E03.9) Hypothyroidism, unspecified type  Comment:   TSH   Date Value Ref Range Status   11/10/2020 7.79 (H) 0.40 - 4.00 mU/L Final      Plan:  levothyroxine 75 mcg/day   '    Christy Wang MD

## 2020-11-19 NOTE — LETTER
"    11/19/2020        RE: Teresa Bates  [address]  [facility] [address]        Teresa Bates is a 89 year old female seen November 19, 2020 at [facility] where she was admitted after [facility] hospitalization 11/9-13 following a fall at home.   Pt had been down about 11 hours and had mild rhabdomyolysis with CK peak 1284   Imaging showed a new T8 compression fracture, and older L1 compression fracture.   She was fitted for a TLSO, and pain managed.   Also noted to have small bilateral pleural effusions thought to be secondary to IVF given for rhabdo.  Not symptomatic, ECHO in August showed LVEF 60-65% and mild-mod aortic stenosis.    She was treated with a single dose of IV furosemide.     Pt has a h/o dizziness and falls, admitted in August again after being found down and with mild rhabdomyolysis.   She has a h/o acoustic neuroma, s/p remote stereotactic radiation.   Chronic dizziness.   Today patient is seen in her room up to chair.   She persistently reports that her back brace is \"too tight.  I can't take a deep breath.\"   It is causing discomfort around the costal margins bilaterally.   Brace is not on tightly, is actually fairly loose, but patient cannot be dissuaded.  She is able to ambulate to the BR with FWW, but it is \"a lot of effort.\"          Past Medical History:   Diagnosis Date     Acoustic neuroma (H) 8/24/2016     Hypothyroidism, unspecified type 8/24/2016     Osteopenia 8/24/2016       Past Surgical History:   Procedure Laterality Date     Stereotactic radiation to acustic neuroma on left with Dr. Galindo Left 05/10/200       Family History   Problem Relation Age of Onset     Family History Negative Mother      Myocardial Infarction Father      Multiple Sclerosis Daughter      Osteoporosis Daughter        Social History     Tobacco Use     Smoking status: Former Smoker     Packs/day: 1.00     Years: 30.00     Pack years: 30.00     Types: " Cigarettes     Smokeless tobacco: Never Used   Substance Use Topics     Alcohol use: No     Alcohol/week: 0.0 standard drinks      SH: Lives alone, [place] apartment in [place].   She has a son Sohail.      Review Of Systems  Skin: abrasion left elbow  Eyes: impaired vision  Ears/Nose/Throat: hearing loss  Respiratory: No shortness of breath, dyspnea on exertion, cough, or hemoptysis  Cardiovascular: as above  Gastrointestinal: negative  Genitourinary: negative  Musculoskeletal: frequent falls, ambulatory with walker at home  Neurologic: BIMS 11/15, chronic dizziness  Psychiatric: negative  Hematologic/Lymphatic/Immunologic: negative  Endocrine: hypothyroidism     Last Comprehensive Metabolic Panel:  Sodium   Date Value Ref Range Status   11/19/2020 137 136 - 145 mmol/L Final     Potassium   Date Value Ref Range Status   11/19/2020 4.0 3.5 - 5.0 mmol/L Final     Chloride   Date Value Ref Range Status   11/19/2020 102 98 - 107 mmol/L Final     Carbon Dioxide   Date Value Ref Range Status   11/19/2020 30 22 - 31 mmol/L Final     Anion Gap   Date Value Ref Range Status   11/19/2020 5 5 - 18 mmol/L Final     Glucose   Date Value Ref Range Status   11/19/2020 105 70 - 125 mg/dL Final     Urea Nitrogen   Date Value Ref Range Status   11/19/2020 15 8 - 28 mg/dL Final     Creatinine   Date Value Ref Range Status   11/19/2020 0.62 0.60 - 1.10 mg/dL Final     GFR Estimate   Date Value Ref Range Status   11/19/2020 >60 >60 ml/min/1.73m2 Final     Calcium   Date Value Ref Range Status   11/19/2020 8.7 8.5 - 10.5 mg/dL Final     Lab Results   Component Value Date    WBC 10.4 11/11/2020      HGB 12.4 11/11/2020       11/11/2020       11/11/2020     MRI L spine 11/9/2020     IMPRESSION:  1. Old L1 compression deformity. No evidence for any acute fracture.  2. Multilevel degenerative disc and facet disease most advanced at  L4-L5 where there is degenerative spondylolisthesis resulting in  moderate bilateral neural foraminal  stenosis and mild central canal  stenosis. There is also mild to moderate bilateral neural foraminal  stenosis and mild central canal stenosis at L3-L4 as well as mild  central canal stenosis at L2-L3.                   MRI T spine 11/9/2020     IMPRESSION:  1. Recent-appearing T8 compression fracture with moderate associated  vertebral height loss and posterior buckling/retropulsion of the  posterior vertebral cortex along the ventral aspect of the spinal canal, resulting in mild spinal canal stenosis.  2. Multiple chronic thoracic and upper lumbar compression deformities, as detailed.  3. Moderate bordering on moderate to severe left T8-T9 neural  foraminal stenosis, with lesser degrees of neural foraminal narrowing elsewhere. No high-grade spinal canal stenosis.  4. Bilateral pleural effusions.    Head CT 11/9/2020  IMPRESSION:  Diffuse cerebral volume loss and cerebral white matter  changes consistent with chronic small vessel ischemic disease. No evidence for acute intracranial pathology.       IMP/PLAN:   (S22.060D) Compression fracture of T8 vertebra with routine healing, subsequent encounter    (S32.010D) Compression fracture of L1 vertebra with routine healing, subsequent encounter  Comment: suspect feeling of TLSO being too tight is related to pain from her fractures  Plan: will change acetaminophen from prn to scheduled tid; remains on once daily ibuprofen 200 mg as well   Continue Voltaren gel and lidocaine patch     Calcium / vit D daily     (W19.XXXS) Fall, sequela  (T79.6XXD) Traumatic rhabdomyolysis, subsequent encounter   (R53.81) Physical deconditioning  Comment: treated with IVF and CK normalized  Plan: PHYSICAL THERAPY / OCCUPATIONAL THERAPY for balance, gait, strengthening and ADLs.   Her [place]  However with falls and down for long periods of time x2, she may need a higher level of support.       (E03.9) Hypothyroidism, unspecified type  Comment:    TSH   Date Value Ref Range Status   11/10/2020 7.79 (H) 0.40 - 4.00 mU/L Final      Plan: levothyroxine 75 mcg/day   '    Christy Wang MD         Sincerely,        Christy Wang MD

## 2020-11-23 ASSESSMENT — MIFFLIN-ST. JEOR: SCORE: 925.29

## 2020-11-24 ENCOUNTER — NURSING HOME VISIT (OUTPATIENT)
Dept: GERIATRICS | Facility: CLINIC | Age: 85
End: 2020-11-24
Payer: MEDICARE

## 2020-11-24 VITALS
RESPIRATION RATE: 18 BRPM | OXYGEN SATURATION: 94 % | DIASTOLIC BLOOD PRESSURE: 64 MMHG | TEMPERATURE: 96.9 F | HEIGHT: 62 IN | SYSTOLIC BLOOD PRESSURE: 104 MMHG | BODY MASS INDEX: 22.19 KG/M2 | HEART RATE: 92 BPM | WEIGHT: 120.6 LBS

## 2020-11-24 DIAGNOSIS — S32.010D COMPRESSION FRACTURE OF L1 VERTEBRA WITH ROUTINE HEALING, SUBSEQUENT ENCOUNTER: ICD-10-CM

## 2020-11-24 DIAGNOSIS — M85.80 OSTEOPENIA, UNSPECIFIED LOCATION: ICD-10-CM

## 2020-11-24 DIAGNOSIS — E03.9 HYPOTHYROIDISM, UNSPECIFIED TYPE: ICD-10-CM

## 2020-11-24 DIAGNOSIS — S22.060D COMPRESSION FRACTURE OF T8 VERTEBRA WITH ROUTINE HEALING, SUBSEQUENT ENCOUNTER: ICD-10-CM

## 2020-11-24 DIAGNOSIS — K59.00 CONSTIPATION, UNSPECIFIED CONSTIPATION TYPE: ICD-10-CM

## 2020-11-24 DIAGNOSIS — R53.81 PHYSICAL DECONDITIONING: ICD-10-CM
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
## 2020-11-24 DIAGNOSIS — R41.89 COGNITIVE IMPAIRMENT: ICD-10-CM

## 2020-11-24 PROCEDURE — 99309 SBSQ NF CARE MODERATE MDM 30: CPT | Performed by: NURSE PRACTITIONER

## 2020-11-24 NOTE — PROGRESS NOTES
"[facility]  [place] Medical Record Number:  [id number]  Place of Service where encounter took place:  [facility] ([facility]) [[id number]]  Chief Complaint   Patient presents with     Nursing Home Acute     HPI:    Teresa Bates  is a 89 year old (10/9/1931), who is being seen today for an episodic care visit.  HPI information obtained from: facility chart records, facility staff, patient report and [facility] chart review.     This is an 89-year-old female, with a past medical history significant for hypothyroidism and osteopenia, who was admitted to [facility] 11/9/20 through 11/13/20 after a mechanical fall. A lumbar spine x-ray revealed \"moderate chronic compression deformity of the L1 vertebral body\". A thoracic spine x-ray revealed \".. moderate compression deformity of the T8 vertebral body\". Neurosurgery was consulted and a TLSO brace was recommended when out of bed. Noted to have rhabdomyolysis secondary to being on the floor for a prolonged period of time. An MRI of the spine revealed small bilateral pleural effusions Thought to be secondary to IV fluids. Furosemide IV x  1 was administered. A TCU stay was recommended for ongoing physical rehabilitation.     Today's concern is:    Chronic L1 Compression Fracture and Age Indeterminate T8  Compression Fracture. Reports she has some trouble with her brace beng uncomfortable. Back pain improved with pain medication. Upon review of documentation, has not utilized Acetaminophen PRN since scheduled Acetaminophen was discontinued on 11/20/20. Has not utilized Diclofenac gel in the past week.    Constipation. Reports drinking Prune Juice every morning to keep bowels regular. Upon review of documentation, has not utilized Miralax PRN. Has had 4 bowel movements in the past 5 days.    Cognitive Impairment. SLUMS Score 19/30. Safety Questions 25/27    Physical Deconditioning. Working with Physical and " "Occupational Therapy. Ambulating  feet with FWW and CGA. Moderate-to-maximum bed mobility. Minimal assistance with transfers. Minimum-to-maximum assistance with upper body dressing. Moderate assistance with lower body dressing.     Past Medical and Surgical History reviewed in Epic today.    MEDICATIONS:  Current Outpatient Medications   Medication Sig Dispense Refill     acetaminophen (TYLENOL) 325 MG tablet Take 1 tablet (325 mg) by mouth every 6 hours as needed for mild pain       bisacodyl (DULCOLAX) 10 MG suppository Place 10 mg rectally daily as needed for constipation       calcium-vitamin D (CALTRATE) 600-400 MG-UNIT per tablet Take 1 tablet by mouth daily (before lunch)        diclofenac (VOLTAREN) 1 % topical gel Apply 2 g topically 4 times daily as needed for moderate pain       ibuprofen (ADVIL/MOTRIN) 200 MG tablet Take 200 mg by mouth every morning       levothyroxine (SYNTHROID/LEVOTHROID) 75 MCG tablet Take 1 tablet (75 mcg) by mouth daily 90 tablet 3     Lidocaine (LIDOCARE) 4 % Patch Place 1 patch onto the skin every 24 hours To prevent lidocaine toxicity, patient should be patch free for 12 hrs daily. Patch should be removed at night. 7 patch      Multiple Vitamins-Minerals (CENTRUM SILVER 50+WOMEN PO) Take 1 tablet by mouth daily (before lunch)        polyethylene glycol (MIRALAX) 17 g packet Take 17 g by mouth daily as needed for constipation       REVIEW OF SYSTEMS:  4 point ROS including Respiratory, CV, GI and , other than that noted in the HPI,  is negative    Objective:  /64   Pulse 92   Temp 96.9  F (36.1  C)   Resp 18   Ht 1.575 m (5' 2\")   Wt 54.7 kg (120 lb 9.6 oz)   SpO2 94%   BMI 22.06 kg/m    Exam Limited due to COVID-19 Pandemic:  GENERAL APPEARANCE:  Alert, in no distress  ENT:  Mouth and posterior oropharynx normal, moist mucous membranes  EYES:  EOM, conjunctivae, lids, pupils and irises normal  RESP:  no respiratory distress  PSYCH:  affect and mood " normal    Labs:   Labs done in SNF are in [place] EPIC. Please refer to them using EPIC/Care Everywhere.    ASSESSMENT/PLAN:  Chronic L1 Compression Fracture and Age Indeterminate T8  Compression Fracture. Secondary to a fall. Follow-up with Ortho Spine 6 weeks from hospital discharge. TLSO brace on when OOB. Acetaminophen, Ibuprofen, Lidocaine Patch and Diclofenac gel ordered for pain control. Physical and Occupational Therapy ordered.    Constipation. Continue Prune Juice, Bisacodyl and Miralax as ordered.    Cognitive Impairment. SLUMS Score indicates cognitive impairment. Lived alone PTA. Occupational Therapy to continue cognitive assessment.  to assist with appropriate discharge disposition.    Hypothyroidism. TSH 7.79 on 11/10/20 and Free T4 1.09 on 11/10/20. Given age, TSH seems appropriate. Continue Levothyroxine as ordered.    Physical Deconditioning. Secondary to recent hospitalization and co-morbidities. Physical and Occupational Therapy ordered.    Orders written by provider at facility  None    Electronically signed by:  OSWALDO Avilez CNP

## 2020-11-24 NOTE — LETTER
"    11/24/2020        RE: Teresa Bates  [address] [place] [id number]        [facility] [place]  [place] Medical Record Number:  [id number]  Place of Service where encounter took place:  [facility] ([facility]) [[id number]]  Chief Complaint   Patient presents with     Nursing Home Acute     HPI:    Teresa Bates  is a 89 year old (10/9/1931), who is being seen today for an episodic care visit.  HPI information obtained from: facility chart records, facility staff, patient report and [facility] chart review.     This is an 89-year-old female, with a past medical history significant for hypothyroidism and osteopenia, who was admitted to [facility] 11/9/20 through 11/13/20 after a mechanical fall. A lumbar spine x-ray revealed \"moderate chronic compression deformity of the L1 vertebral body\". A thoracic spine x-ray revealed \".. moderate compression deformity of the T8 vertebral body\". Neurosurgery was consulted and a TLSO brace was recommended when out of bed. Noted to have rhabdomyolysis secondary to being on the floor for a prolonged period of time. An MRI of the spine revealed small bilateral pleural effusions Thought to be secondary to IV fluids. Furosemide IV x  1 was administered. A TCU stay was recommended for ongoing physical rehabilitation.     Today's concern is:    Chronic L1 Compression Fracture and Age Indeterminate T8  Compression Fracture. Reports she has some trouble with her brace beng uncomfortable. Back pain improved with pain medication. Upon review of documentation, has not utilized Acetaminophen PRN since scheduled Acetaminophen was discontinued on 11/20/20. Has not utilized Diclofenac gel in the past week.    Constipation. Reports drinking Prune Juice every morning to keep bowels regular. Upon review of documentation, has not utilized Miralax PRN. Has had 4 bowel movements in the past 5 days.    Cognitive " "Impairment. SLUMS Score 19/30. Safety Questions 25/27    Physical Deconditioning. Working with Physical and Occupational Therapy. Ambulating  feet with FWW and CGA. Moderate-to-maximum bed mobility. Minimal assistance with transfers. Minimum-to-maximum assistance with upper body dressing. Moderate assistance with lower body dressing.     Past Medical and Surgical History reviewed in Epic today.    MEDICATIONS:  Current Outpatient Medications   Medication Sig Dispense Refill     acetaminophen (TYLENOL) 325 MG tablet Take 1 tablet (325 mg) by mouth every 6 hours as needed for mild pain       bisacodyl (DULCOLAX) 10 MG suppository Place 10 mg rectally daily as needed for constipation       calcium-vitamin D (CALTRATE) 600-400 MG-UNIT per tablet Take 1 tablet by mouth daily (before lunch)        diclofenac (VOLTAREN) 1 % topical gel Apply 2 g topically 4 times daily as needed for moderate pain       ibuprofen (ADVIL/MOTRIN) 200 MG tablet Take 200 mg by mouth every morning       levothyroxine (SYNTHROID/LEVOTHROID) 75 MCG tablet Take 1 tablet (75 mcg) by mouth daily 90 tablet 3     Lidocaine (LIDOCARE) 4 % Patch Place 1 patch onto the skin every 24 hours To prevent lidocaine toxicity, patient should be patch free for 12 hrs daily. Patch should be removed at night. 7 patch      Multiple Vitamins-Minerals (CENTRUM SILVER 50+WOMEN PO) Take 1 tablet by mouth daily (before lunch)        polyethylene glycol (MIRALAX) 17 g packet Take 17 g by mouth daily as needed for constipation       REVIEW OF SYSTEMS:  4 point ROS including Respiratory, CV, GI and , other than that noted in the HPI,  is negative    Objective:  /64   Pulse 92   Temp 96.9  F (36.1  C)   Resp 18   Ht 1.575 m (5' 2\")   Wt 54.7 kg (120 lb 9.6 oz)   SpO2 94%   BMI 22.06 kg/m    Exam Limited due to COVID-19 Pandemic:  GENERAL APPEARANCE:  Alert, in no distress  ENT:  Mouth and posterior oropharynx normal, moist mucous membranes  EYES:  EOM, " conjunctivae, lids, pupils and irises normal  RESP:  no respiratory distress  PSYCH:  affect and mood normal    Labs:   Labs done in SNF are in [employer] EPIC. Please refer to them using EPIC/Care Everywhere.    ASSESSMENT/PLAN:  Chronic L1 Compression Fracture and Age Indeterminate T8  Compression Fracture. Secondary to a fall. Follow-up with Ortho Spine 6 weeks from hospital discharge. TLSO brace on when OOB. Acetaminophen, Ibuprofen, Lidocaine Patch and Diclofenac gel ordered for pain control. Physical and Occupational Therapy ordered.    Constipation. Continue Prune Juice, Bisacodyl and Miralax as ordered.    Cognitive Impairment. SLUMS Score indicates cognitive impairment. Lived alone PTA. Occupational Therapy to continue cognitive assessment.  to assist with appropriate discharge disposition.    Hypothyroidism. TSH 7.79 on 11/10/20 and Free T4 1.09 on 11/10/20. Given age, TSH seems appropriate. Continue Levothyroxine as ordered.    Physical Deconditioning. Secondary to recent hospitalization and co-morbidities. Physical and Occupational Therapy ordered.    Orders written by provider at facility  None    Electronically signed by:  OSWALDO Avilez CNP             Sincerely,        OSWALDO Avilez CNP

## 2020-11-26 RX ORDER — AMMONIUM LACTATE 12 G/100G
CREAM TOPICAL DAILY
COMMUNITY
End: 2020-12-14

## 2020-11-30 ASSESSMENT — MIFFLIN-ST. JEOR: SCORE: 913.49

## 2020-12-03 ENCOUNTER — NURSING HOME VISIT (OUTPATIENT)
Dept: GERIATRICS | Facility: CLINIC | Age: 85
End: 2020-12-03
Payer: MEDICARE

## 2020-12-03 VITALS
DIASTOLIC BLOOD PRESSURE: 74 MMHG | BODY MASS INDEX: 21.71 KG/M2 | HEIGHT: 62 IN | SYSTOLIC BLOOD PRESSURE: 110 MMHG | OXYGEN SATURATION: 96 % | TEMPERATURE: 97.6 F | WEIGHT: 118 LBS | HEART RATE: 79 BPM | RESPIRATION RATE: 18 BRPM

## 2020-12-03 DIAGNOSIS — R41.89 COGNITIVE IMPAIRMENT: ICD-10-CM

## 2020-12-03 DIAGNOSIS — S32.010D COMPRESSION FRACTURE OF L1 VERTEBRA WITH ROUTINE HEALING, SUBSEQUENT ENCOUNTER: Primary | ICD-10-CM

## 2020-12-03 DIAGNOSIS — M85.80 OSTEOPENIA, UNSPECIFIED LOCATION: ICD-10-CM

## 2020-12-03 DIAGNOSIS — S22.060A COMPRESSION FRACTURE OF T8 VERTEBRA, INITIAL ENCOUNTER (H): ICD-10-CM

## 2020-12-03 DIAGNOSIS — K59.00 CONSTIPATION, UNSPECIFIED CONSTIPATION TYPE: ICD-10-CM

## 2020-12-03 DIAGNOSIS — R53.81 PHYSICAL DECONDITIONING: ICD-10-CM

## 2020-12-03 DIAGNOSIS — E03.9 HYPOTHYROIDISM, UNSPECIFIED TYPE: ICD-10-CM

## 2020-12-03 DIAGNOSIS — J90 BILATERAL PLEURAL EFFUSION: ICD-10-CM

## 2020-12-03 PROCEDURE — 99309 SBSQ NF CARE MODERATE MDM 30: CPT | Performed by: NURSE PRACTITIONER

## 2020-12-03 RX ORDER — ACETAMINOPHEN 325 MG/1
325 TABLET ORAL EVERY 6 HOURS PRN
COMMUNITY
Start: 2020-12-03

## 2020-12-03 NOTE — PROGRESS NOTES
[place] [facility]  [place] Medical Record Number:  [id number]  Place of Service where encounter took place:  [facility] ([facility]) [[id number]]  Chief Complaint   Patient presents with     Nursing Home Acute       HPI:    Teresa Bates  is a 89 year old (10/9/1931), who is being seen today for an episodic care visit.  HPI information obtained from: facility chart records, facility staff, patient report and [facility] chart review. Today's concern is:     Compression fracture of L1 vertebra with routine healing, subsequent encounter  Compression fracture of T8 vertebra, initial encounter (H)  Osteopenia, unspecified location  Physical deconditioning  Constipation, unspecified constipation type  Cognitive impairment  Hypothyroidism, unspecified type  Bilateral pleural effusion      Past Medical and Surgical History reviewed in Epic today.    MEDICATIONS:     Current Outpatient Medications   Medication Sig Dispense Refill     acetaminophen (TYLENOL) 325 MG tablet Take 650 mg by mouth 4 times daily as needed        ammonium lactate (LAC-HYDRIN) 12 % external cream Apply topically daily Apply to legs topically and every day PRN       bisacodyl (DULCOLAX) 10 MG suppository Place 10 mg rectally daily as needed for constipation       calcium-vitamin D (CALTRATE) 600-400 MG-UNIT per tablet Take 1 tablet by mouth daily (before lunch)        diclofenac (VOLTAREN) 1 % topical gel Apply 2 g topically 4 times daily as needed for moderate pain       ibuprofen (ADVIL/MOTRIN) 200 MG tablet Take 200 mg by mouth every morning       levothyroxine (SYNTHROID/LEVOTHROID) 75 MCG tablet Take 1 tablet (75 mcg) by mouth daily 90 tablet 3     Lidocaine (LIDOCARE) 4 % Patch Place 1 patch onto the skin every 24 hours To prevent lidocaine toxicity, patient should be patch free for 12 hrs daily. Patch should be removed at night. 7 patch      Multiple Vitamins-Minerals (CENTRUM SILVER 50+WOMEN PO)  "Take 1 tablet by mouth daily (before lunch)        polyethylene glycol (MIRALAX) 17 g packet Take 17 g by mouth daily as needed for constipation       TODAY DURING EXAM/ROS:  No CP, SOB, Cough, dizziness, fevers, chills, HA, N/V, dysuria or Bowel Abnormalities. Appetite is good.  No pain and \"I don't want to wear this brace it is very uncomfortable and digs into my ribs\".    Objective:  /74   Pulse 79   Temp 97.6  F (36.4  C)   Resp 18   Ht 1.575 m (5' 2\")   Wt 53.5 kg (118 lb)   SpO2 96%   BMI 21.58 kg/m    Exam:  GENERAL APPEARANCE:  Alert, in no distress, oriented, cooperative  ENT:  Mouth with moist mucous membranes, normal hearing acuity  RESP:  respiratory effort of chest normal, lungs clear to auscultation except a few scattered crackles in bases and diminished breath sounds    CV:  Auscultation of heart done , regular rate and rhythm, no murmur, rub, or gallop, no edema, +1 pedal pulses/warm feet  ABDOMEN:  normal bowel sounds, soft, nontender   M/S:   seen in chair,  REYNA equally  SKIN:  Inspection of skin is at baseline, trace LE edema  NEURO:   Cranial nerves are grossly at patient's baseline  PSYCH:  oriented X 3, normal insight, judgement and memory, anxious      Labs:   Recent Labs   Lab Test 11/19/20 11/13/20  0620    137   POTASSIUM 4.0 4.0   CHLORIDE 102 107   CO2 30 26   ANIONGAP 5 4    93   BUN 15 14   CR 0.62 0.47*   AMAN 8.7 7.7*     CBC RESULTS:   Recent Labs   Lab Test 11/11/20  0846   WBC 10.4   RBC 3.72*   HGB 12.4   HCT 37.8   *   MCH 33.3*   MCHC 32.8   RDW 14.0            ASSESSMENT / PLAN:  (S32.010D) Compression fracture of L1 vertebra with routine healing, subsequent encounter  (primary encounter diagnosis)   (S22.060A) Compression fracture of T8 vertebra, initial encounter (H)   (M85.80) Osteopenia, unspecified location   (R53.81) Physical deconditioning  Comment/Plan: change tylenol to prn, no pain. Cont other pain modalities for now.  I will " have [facility] call [facility]--can we discontinue TLSO--pt does not want.  Will see them in a few weeks also. She is progressing well with PT and OT--walking up to 200 feet.     (K59.00) Constipation, unspecified constipation type  Comment/Plan: controlled, cont same poc,     (R41.89) Cognitive impairment  Comment/Plan: slums 19/30 and Safety eval 25/27    (E03.9) Hypothyroidism, unspecified type  Comment/Plan: cont same med, poc, monitor.    (J90) Bilateral pleural effusion  Comment/Plan: in no acute distress but likely still some fluid per exam.  Will monitor, no new changes or tests nec at this time.      Electronically signed by:  OSWALDO Oliveros CNP

## 2020-12-03 NOTE — Clinical Note
AYAKA Peguero, Please see my note.   Pt does not want to wear TLSO--no pain.  Are you ok if we discontinue it to prn usage?  Have no discharge date yet from TCU but she is progressing well.     Myranda Warren RN, CNP [facility]

## 2020-12-07 ASSESSMENT — MIFFLIN-ST. JEOR
SCORE: 911.68
SCORE: 911.68

## 2020-12-10 ENCOUNTER — NURSING HOME VISIT (OUTPATIENT)
Dept: GERIATRICS | Facility: CLINIC | Age: 85
End: 2020-12-10
Payer: MEDICARE

## 2020-12-10 VITALS
RESPIRATION RATE: 18 BRPM | BODY MASS INDEX: 21.64 KG/M2 | DIASTOLIC BLOOD PRESSURE: 66 MMHG | HEART RATE: 67 BPM | TEMPERATURE: 97.8 F | SYSTOLIC BLOOD PRESSURE: 111 MMHG | OXYGEN SATURATION: 96 % | WEIGHT: 117.6 LBS | HEIGHT: 62 IN

## 2020-12-10 DIAGNOSIS — M85.80 OSTEOPENIA, UNSPECIFIED LOCATION: ICD-10-CM

## 2020-12-10 DIAGNOSIS — R53.81 PHYSICAL DECONDITIONING: ICD-10-CM

## 2020-12-10 DIAGNOSIS — N18.1 CKD (CHRONIC KIDNEY DISEASE) STAGE 1, GFR 90 ML/MIN OR GREATER: ICD-10-CM

## 2020-12-10 DIAGNOSIS — E03.9 HYPOTHYROIDISM, UNSPECIFIED TYPE: ICD-10-CM

## 2020-12-10 DIAGNOSIS — S32.010D COMPRESSION FRACTURE OF L1 VERTEBRA WITH ROUTINE HEALING, SUBSEQUENT ENCOUNTER: Primary | ICD-10-CM

## 2020-12-10 DIAGNOSIS — R41.89 COGNITIVE IMPAIRMENT: ICD-10-CM

## 2020-12-10 DIAGNOSIS — K59.00 CONSTIPATION, UNSPECIFIED CONSTIPATION TYPE: ICD-10-CM

## 2020-12-10 DIAGNOSIS — S22.060D COMPRESSION FRACTURE OF T8 VERTEBRA WITH ROUTINE HEALING, SUBSEQUENT ENCOUNTER: ICD-10-CM

## 2020-12-10 PROCEDURE — 99309 SBSQ NF CARE MODERATE MDM 30: CPT | Performed by: NURSE PRACTITIONER

## 2020-12-10 NOTE — PROGRESS NOTES
[place] [facility]  [place] Medical Record Number:  [id number]  Place of Service where encounter took place:  [facility] ([facility]) [[id number]]  No chief complaint on file.      HPI:    Teresa Bates  is a 89 year old (10/9/1931), who is being seen today for an episodic care visit.  HPI information obtained from: facility chart records, facility staff, patient report and [facility] chart review. Today's concern is: conts to have no back pain, moving better without the TLSO     Compression fracture of L1 vertebra with routine healing, subsequent encounter  Compression fracture of T8 vertebra, initial encounter (H)  Osteopenia, unspecified location  Physical deconditioning  Constipation, unspecified constipation type  Cognitive impairment  Hypothyroidism, unspecified type  CKD (chronic kidney disease) stage 1, GFR 90 ml/min or greater      Past Medical and Surgical History reviewed in Epic today.    MEDICATIONS:     Current Outpatient Medications   Medication Sig Dispense Refill     polyethylene glycol (MIRALAX) 17 g packet Take 17 g by mouth daily as needed for constipation       acetaminophen (TYLENOL) 325 MG tablet Take 650 mg by mouth 4 times daily as needed        ammonium lactate (LAC-HYDRIN) 12 % external cream Apply topically daily Apply to legs topically and every day PRN       bisacodyl (DULCOLAX) 10 MG suppository Place 10 mg rectally daily as needed for constipation       calcium-vitamin D (CALTRATE) 600-400 MG-UNIT per tablet Take 1 tablet by mouth daily (before lunch)        ibuprofen (ADVIL/MOTRIN) 200 MG tablet Take 200 mg by mouth every morning       levothyroxine (SYNTHROID/LEVOTHROID) 75 MCG tablet Take 1 tablet (75 mcg) by mouth daily 90 tablet 3     Lidocaine (LIDOCARE) 4 % Patch Place 1 patch onto the skin every 24 hours To prevent lidocaine toxicity, patient should be patch free for 12 hrs daily. Patch should be removed at night. 7 patch       "Multiple Vitamins-Minerals (CENTRUM SILVER 50+WOMEN PO) Take 1 tablet by mouth daily (before lunch)        TODAY DURING EXAM/ROS:  No CP, SOB, Cough, dizziness, fevers, chills, HA, N/V, dysuria or Bowel Abnormalities. Appetite is good.  No pain.    Objective:  /66   Pulse 67   Temp 97.8  F (36.6  C)   Resp 18   Ht 1.575 m (5' 2\")   Wt 53.3 kg (117 lb 9.6 oz)   SpO2 96%   BMI 21.51 kg/m    Exam:  GENERAL APPEARANCE:  Alert, in no distress, oriented, cooperative  ENT:  Mouth with moist mucous membranes, normal hearing acuity  RESP:  respiratory effort of chest normal, lungs clear to auscultation,   diminished breath sounds    CV:  Auscultation of heart done , RRR, no murmur, rub, or gallop, no edema, +1 pedal pulses/warm feet  ABDOMEN:  normal bowel sounds, soft, nontender   M/S:   seen in chair,  REYNA equally  SKIN:  Inspection of skin is at baseline--heels are fine, no redness or soreness.  NEURO:   Cranial nerves are grossly at patient's baseline  PSYCH:  oriented X 3, normal insight, judgement and memory, anxious      Labs:   Recent Labs   Lab Test 11/19/20 11/13/20  0620    137   POTASSIUM 4.0 4.0   CHLORIDE 102 107   CO2 30 26   ANIONGAP 5 4    93   BUN 15 14   CR 0.62 0.47*   AMAN 8.7 7.7*     CBC RESULTS:   Recent Labs   Lab Test 11/11/20  0846   WBC 10.4   RBC 3.72*   HGB 12.4   HCT 37.8   *   MCH 33.3*   MCHC 32.8   RDW 14.0            ASSESSMENT / PLAN:  (S32.010D) Compression fracture of L1 vertebra with routine healing, subsequent encounter  (primary encounter diagnosis)   (S22.060D) Compression fracture of T8 vertebra,Subsequent Visit   (M85.80) Osteopenia, unspecified location   (R53.81) Physical deconditioning  Comment/Plan: discontinue Voltaren, keep tylenol at prn, having no pain. Off TLSO.  She is progressing well with PT and OT--LCD 12/17, f/u Neuro Sx on 12/16/20    (K59.00) Constipation, unspecified constipation type  Comment/Plan: controlled, cont same POC, " monitor.    (R41.89) Cognitive impairment  Comment/Plan: CPT 4.9/5.6, SLUMS 19/30 and Safety eval 25/27    (E03.9) Hypothyroidism, unspecified type  Comment/Plan: cont same med, POC, monitor.      (N18.1) CKD (chronic kidney disease) stage 1, GFR 90 ml/min or greater  Comment/Plan:  avoid nephrotoxic meds, monitor prn      Electronically signed by:  OSWALDO Oliveros CNP

## 2020-12-14 ENCOUNTER — DISCHARGE SUMMARY NURSING HOME (OUTPATIENT)
Dept: GERIATRICS | Facility: CLINIC | Age: 85
End: 2020-12-14
Payer: MEDICARE

## 2020-12-14 ENCOUNTER — TRANSFERRED RECORDS (OUTPATIENT)
Dept: HEALTH INFORMATION MANAGEMENT | Facility: CLINIC | Age: 85
End: 2020-12-14

## 2020-12-14 VITALS
OXYGEN SATURATION: 96 % | HEART RATE: 84 BPM | HEIGHT: 62 IN | RESPIRATION RATE: 17 BRPM | TEMPERATURE: 97.6 F | BODY MASS INDEX: 21.64 KG/M2 | SYSTOLIC BLOOD PRESSURE: 107 MMHG | WEIGHT: 117.6 LBS | DIASTOLIC BLOOD PRESSURE: 67 MMHG

## 2020-12-14 DIAGNOSIS — T79.6XXD TRAUMATIC RHABDOMYOLYSIS, SUBSEQUENT ENCOUNTER: ICD-10-CM

## 2020-12-14 DIAGNOSIS — M85.80 OSTEOPENIA, UNSPECIFIED LOCATION: ICD-10-CM

## 2020-12-14 DIAGNOSIS — S32.010D COMPRESSION FRACTURE OF L1 VERTEBRA WITH ROUTINE HEALING, SUBSEQUENT ENCOUNTER: ICD-10-CM

## 2020-12-14 DIAGNOSIS — R53.81 PHYSICAL DECONDITIONING: ICD-10-CM

## 2020-12-14 DIAGNOSIS — K59.00 CONSTIPATION, UNSPECIFIED CONSTIPATION TYPE: ICD-10-CM

## 2020-12-14 DIAGNOSIS — S22.060D COMPRESSION FRACTURE OF T8 VERTEBRA WITH ROUTINE HEALING, SUBSEQUENT ENCOUNTER: ICD-10-CM
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
## 2020-12-14 DIAGNOSIS — E03.9 HYPOTHYROIDISM, UNSPECIFIED TYPE: ICD-10-CM

## 2020-12-14 DIAGNOSIS — Z71.89 ADVANCED DIRECTIVES, COUNSELING/DISCUSSION: ICD-10-CM

## 2020-12-14 DIAGNOSIS — N18.1 CKD (CHRONIC KIDNEY DISEASE) STAGE 1, GFR 90 ML/MIN OR GREATER: ICD-10-CM

## 2020-12-14 PROCEDURE — 99316 NF DSCHRG MGMT 30 MIN+: CPT | Performed by: NURSE PRACTITIONER

## 2020-12-14 NOTE — PATIENT INSTRUCTIONS
[facility] Discharge Orders    Name: Teresa Bates  : 10/9/1931  Planned Discharge Date: -  Discharged to: previous independent home/apt    MEDICAL FOLLOW UP  Follow up with PCP in 1-2 weeks--family or pt to make appt  Follow up with Specialists see below  Next 5 appointments (look out 90 days)    Dec 16, 2020  1:30 PM  Telephone Visit with Annamarie Esquivel NP  [facility] ([facility]) [address] [place] [id number] [phone]          FUTURE LABS: No labs orders/due    ORDER CHANGES:  See below: ibuprofen, lidocaine, dc'd per pt request    DISCHARGE MEDICATIONS:  The patient s pharmacy is authorized to dispense a 30-day supply of medications. Refill requests should be directed to the primary provider, Shravan Bourgeois .   No narcotics are prescribed at time of discharge.   Current Outpatient Medications   Medication Sig Dispense Refill     acetaminophen (TYLENOL) 325 MG tablet Take 650 mg by mouth 4 times daily as needed        calcium-vitamin D (CALTRATE) 600-400 MG-UNIT per tablet Take 1 tablet by mouth daily (before lunch)        levothyroxine (SYNTHROID/LEVOTHROID) 75 MCG tablet Take 1 tablet (75 mcg) by mouth daily 90 tablet 3     Multiple Vitamins-Minerals (CENTRUM SILVER 50+WOMEN PO) Take 1 tablet by mouth daily (before lunch)          SERVICES:  Home Care:  Occupational Therapy, Physical Therapy, Registered Nurse, Home Health Aide and From:  VISITING [facility] AND [facility], [facility]    ADDITIONAL INSTRUCTIONS:  None    OSWALDO Oliveros CNP  This document was electronically signed on 2020

## 2020-12-14 NOTE — PROGRESS NOTES
[facility] DISCHARGE SUMMARY    PATIENT'S NAME: Teresa Bates  YOB: 1931    PRIMARY CARE PROVIDER AND CLINIC RESPONSIBLE AFTER TRANSFER: Shravan Bourgeois     CODE STATUS: Full Code    TRANSFERRING PROVIDERS: OSWALDO Oliveros CNP, Dr. Camilo Rubio MD      DATE OF SNF ADMISSION:      DATE OF SNF DISCHARGE (including anticipating DC):     DISCHARGE DISPOSITION: FMG Provider    Nursing Facility: [facility] stay 20 to 20.     Condition on Discharge:  Improving.    Function:  Ambulatin-300 ft w/fww, Transfers: mod a  Cognitive Scores: CPT 4.9-5.6    Physical Function: TUG 48  Equipment: walker  DME: Walker    DISCHARGE DIAGNOSIS:      Fall, sequela  Compression fracture of T8 vertebra with routine healing, subsequent encounter  Compression fracture of L1 vertebra with routine healing, subsequent encounter  Traumatic rhabdomyolysis, subsequent encounter  Osteopenia, unspecified location  Physical deconditioning  Hypothyroidism, unspecified type  CKD (chronic kidney disease) stage 1, GFR 90 ml/min or greater  Constipation, unspecified constipation type  Advanced directives, counseling/discussion        HOSPITAL COURSE: Pt lives alone and had d fall at home. She was walking backwards in the kitchen with her walker and lost her balance.. She was on the floor about 1 hr and was found by her son in law.   Sent to er--workup showed Rhabdomyolysis and a likely acute T8 fracture.  Also noted was a L1 fracture that appears to be chronic in nature. She was put on scheduled Tylenol, Voltaren and lidocaine patch    TCU/SNF COURSE: she has greatly improved.  She had significant pain initially and wore the TLSO.  In short orders, she felt the TLSO was causing her more pain in her chest than helping.. it was dc'd by Ortho/spine--she had no pain issues and has cont'd  "to improve.  She wants to take no meds for pain and is walking 250-300 feet with  Walker and SBA. She will have a  Telephone visit on 12/16 by ortho-spine.      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     Hypothyroidism, unspecified type 8/24/2016     Osteopenia 8/24/2016       DISCHARGE MEDICATIONS:  Current Outpatient Medications   Medication Sig Dispense Refill     acetaminophen (TYLENOL) 325 MG tablet Take 650 mg by mouth 4 times daily as needed        ammonium lactate (LAC-HYDRIN) 12 % external cream Apply topically daily Apply to legs topically and every day PRN       bisacodyl (DULCOLAX) 10 MG suppository Place 10 mg rectally daily as needed for constipation       calcium-vitamin D (CALTRATE) 600-400 MG-UNIT per tablet Take 1 tablet by mouth daily (before lunch)        ibuprofen (ADVIL/MOTRIN) 200 MG tablet Take 200 mg by mouth every morning       levothyroxine (SYNTHROID/LEVOTHROID) 75 MCG tablet Take 1 tablet (75 mcg) by mouth daily 90 tablet 3     Lidocaine (LIDOCARE) 4 % Patch Place 1 patch onto the skin every 24 hours To prevent lidocaine toxicity, patient should be patch free for 12 hrs daily. Patch should be removed at night. 7 patch      Multiple Vitamins-Minerals (CENTRUM SILVER 50+WOMEN PO) Take 1 tablet by mouth daily (before lunch)        polyethylene glycol (MIRALAX) 17 g packet Take 17 g by mouth daily as needed for constipation         MEDICATION CHANGES/RATIONALE:   See discharge orders  /67   Pulse 84   Temp 97.6  F (36.4  C)   Resp 17   Ht 1.575 m (5' 2\")   Wt 53.3 kg (117 lb 9.6 oz)   SpO2 96%   BMI 21.51 kg/m      TODAY DURING EXAM/ROS:  No CP, SOB, Cough, dizziness, fevers, chills, HA, N/V, dysuria or Bowel Abnormalities. Appetite is good.  No pain      PHYSICAL EXAM Today:  A & O x 3, NAD. Lungs CTA except a few fine crackles in bases--not new,  Respirations are non labored. RRR, S1/S2 w/o murmur,gallop or rub.   No " edema.  Abdomen soft, nontender, +BT'S. No focal neurological deficits. REYNA equally and up with assist.          SNF LABS  Recent Labs   Lab Test 11/19/20 11/13/20  0620    137   POTASSIUM 4.0 4.0   CHLORIDE 102 107   CO2 30 26   ANIONGAP 5 4    93   BUN 15 14   CR 0.62 0.47*   AMAN 8.7 7.7*     CBC RESULTS:   Recent Labs   Lab Test 11/11/20  0846   WBC 10.4   RBC 3.72*   HGB 12.4   HCT 37.8   *   MCH 33.3*   MCHC 32.8   RDW 14.0                DISCHARGE PLAN:  Occupational Therapy, Physical Therapy, Home Health Aide and From:  [facility] Follow-up with PCP in 7 days: 7 days.    Current [place] or other scheduled appointments:  Future Appointments   Date Time Provider Department Center   12/16/2020  1:30 PM Annamarie Esquivel NP [facility] referral needed and placed by this provider: none    Pending labs: none     Discharge Treatments:none       TOTAL DISCHARGE TIME:   Greater than 30 minutes    OSWALDO Oliveros CNP    [facility]         Documentation of Face to Face and Certification for Home Health Services    I certify that patient: Teresa Bates is under my care and that I, or a nurse practitioner or physician's assistant working with me, had a face-to-face encounter that meets the physician face-to-face encounter requirements with this patient on: 12/14/2020.    This encounter with the patient was in whole, or in part, for the following medical condition, which is the primary reason for home health care:      Fall, sequela  Compression fracture of T8 vertebra with routine healing, subsequent encounter  Compression fracture of L1 vertebra with routine healing, subsequent encounter  Traumatic rhabdomyolysis, subsequent encounter  Osteopenia, unspecified location  Physical deconditioning  Hypothyroidism, unspecified type  CKD (chronic kidney disease) stage 1, GFR 90 ml/min or greater  Constipation, unspecified constipation  type  Advanced directives, counseling/discussion  .    I certify that, based on my findings, the following services are medically necessary home health services: Nursing, Occupational Therapy and Physical Therapy.    My clinical findings support the need for the above services because: Nurse is needed: To assess med review, BP checks, lung checks after changes in medications or other medical regimen. and To provide assessment and oversight required in the home to assure adherence to the medical plan due to: above noted.., Occupational Therapy Services are needed to assess and treat cognitive ability and address ADL safety due to impairment in deconditioning and fall risk, safety in kitchen and bathroom.  Physical Therapy Services are needed to assess and treat the following functional impairments: deconditioning and weakness and see under OT..    Further, I certify that my clinical findings support that this patient is homebound (i.e. absences from home require considerable and taxing effort and are for medical reasons or [employer] services or infrequently or of short duration when for other reasons) because: Requires assistance of another person or specialized equipment to access medical services because patient: Is unable to walk greater than 300 feet without rest...    Based on the above findings. I certify that this patient is confined to the home and needs intermittent skilled nursing care, physical therapy and/or speech therapy.  The patient is under my care, and I have initiated the establishment of the plan of care.  This patient will be followed by a physician who will periodically review the plan of care.  Physician/Provider to provide follow up care: Shravan Bourgeois    Attending hospital physician (the Medicare certified PECOS provider): OSWALDO Oliveros CNP  Physician Signature: See electronic signature associated with these discharge orders.  Date: 12/14/2020       Face to Face and Medical  Necessity Statement for DME Provider visit    Demographic Information on Teresa Bates:  Gender: female  : 10/9/1931  [address]   [place] [id number] [phone] (home) [phone] (work)    Medical Record: [id number]  Social Security Number: [id number]  Primary Care Provider: Shravan Bourgeois  Insurance: Payor: MEDICARE / Plan: MEDICARE / Product Type: Medicare /     HPI:   Teresa Bates is a 89 year old  (10/9/1931), who [facility]; medical necessity statement for DME included. This patient requires the following:  DME Ordered and Medical Necessity Statement     Wheelchair Documentation  Size: 16x16 Aj   Corresponding cushion: Yes: standard  Standard foot rests: Yes  Elevating leg rests: No  Arm rests: Yes:    Lap tray: No  Dose the patient use oxygen? No   Is the patient able to propel wheelchair? Yes If no why not?   And is there someone who can? yes  1. The patient has mobility limitations that impairs their ability to participate in one or more mobility related activities: Toileting, Feeding, Grooming and Bathing.  The wheelchair is suitable and necessary for use in the patient's home.  2. The patient's mobility limitations cannot be safely resolved by using a cane/walker:Yes    Reason why a cane or walker will not meet the patient's needs. (ie: balance, tolerance, level of assistance) high fall risk, 48 second TUG, SBA for all ambulation and transfers with FWW  3. The patients home has adequate access to use a manual wheelchair:Yes  4. The use of a manual wheelchair on a regular basis will improve the patients ability to participate in mobility related ADL's at home:Yes  5. The patient is willing to use a manual wheelchair at home:Yes  6. The patient has adequate upper body strength and the mental capability to safely use a manual wheelchair and/or has a caregiver that is able to assist: Yes  7. Does the  "patient have a lower extremity injury or edema?No  Reason for Type of Wheelchair  Patient weight: 0 lbs 0 oz16 x 16 Aj height wc due to short stature     118# and 62 inches         Pt needing above DME with expected length of need of 99    months  due to medical necessity associated with following diagnosis:  S22.060D,   S32.010D,    J90,   R296,    M62.82     Fall, sequela  Compression fracture of T8 vertebra with routine healing, subsequent encounter  Compression fracture of L1 vertebra with routine healing, subsequent encounter  Traumatic rhabdomyolysis, subsequent encounter  Osteopenia, unspecified location  Physical deconditioning  Hypothyroidism, unspecified type  CKD (chronic kidney disease) stage 1, GFR 90 ml/min or greater  Constipation, unspecified constipation type  Advanced directives, counseling/discussion      PMH   has a past medical history of Acoustic neuroma (H) (8/24/2016), Hypothyroidism, unspecified type (8/24/2016), and Osteopenia (8/24/2016).    ROS:see above     EXAM  Vitals: /67   Pulse 84   Temp 97.6  F (36.4  C)   Resp 17   Ht 1.575 m (5' 2\")   Wt 53.3 kg (117 lb 9.6 oz)   SpO2 96%   BMI 21.51 kg/m  ;BMI= Body mass index is 21.51 kg/m . see above       ASSESSMENT/PLAN:  1. Fall, sequela    2. Compression fracture of T8 vertebra with routine healing, subsequent encounter    3. Compression fracture of L1 vertebra with routine healing, subsequent encounter    4. Traumatic rhabdomyolysis, subsequent encounter    5. Osteopenia, unspecified location    6. Physical deconditioning    7. Hypothyroidism, unspecified type    8. CKD (chronic kidney disease) stage 1, GFR 90 ml/min or greater    9. Constipation, unspecified constipation type    10. Advanced directives, counseling/discussion        Orders:  1. Facility staff/TC to contact DME company to get their order form for provider to fill out     ELECTRONICALLY SIGNED BY JENNIFER CERTIFIED PROVIDER:  OSWALDO Oliveros CNP     NPI: "     [facility]  [address], [address]  [place] [address]

## 2020-12-16 ENCOUNTER — VIRTUAL VISIT (OUTPATIENT)
Dept: NEUROSURGERY | Facility: CLINIC | Age: 85
End: 2020-12-16
Attending: DENTIST
Payer: MEDICARE

## 2020-12-16 VITALS — HEIGHT: 62 IN | BODY MASS INDEX: 22.08 KG/M2 | WEIGHT: 120 LBS

## 2020-12-16 DIAGNOSIS — S22.060A COMPRESSION FRACTURE OF T8 VERTEBRA, INITIAL ENCOUNTER (H): Primary | ICD-10-CM

## 2020-12-16 PROCEDURE — 99441 PR PHYSICIAN TELEPHONE EVALUATION 5-10 MIN: CPT | Mod: 95 | Performed by: NURSE PRACTITIONER

## 2020-12-16 ASSESSMENT — MIFFLIN-ST. JEOR: SCORE: 922.57

## 2020-12-16 NOTE — LETTER
12/16/2020         RE: Teresa Bates  [email or web address] [facility] [address] [place] [id number]        Dear Colleague,    Thank you for referring your patient, Teresa Bates, to the [facility] [place]. Please see a copy of my visit note below.    HPI: Teresa Bates is an 89 year old female with history of acoustic neuroma, rhabdomyolysis, and osteopenia who was admitted on 11/9/2020 s/p unwitnessed fall at home. Imaging revealed an acute T8 compression fracture and chronic L1 compression fracture. She was placed in a TLSO brace and was recommended to follow up in 6 weeks. She states she has had back and rib discomfort up until very recently. She continues to wear the brace as directed. No new or worsening symptoms.      Medical, surgical, family, and social history unchanged since prior exam.    ROS: 10 point ROS neg other than the symptoms noted above in the HPI.    Imaging: AP and lateral films reveal T8 and L1 compression fractures.    Assessment/Plan:  T8 compression fracture    Patient Instructions   -Continue to wear the brace as instructed.  -Follow up in 6 weeks with thoracic xray prior.   -Please contact our clinic with questions or concerns at [phone].        Phone call start: 1328  Phone call end: 1336  Phone call duration: 8 minutes    Annamarie Esquivel CNP  [facility]  [address] [place] [address]  [address] [place] [address]  Tel [phone]  Fax [phone]          Again, thank you for allowing me to participate in the care of your patient.        Sincerely,        Annamarie Esquivel NP

## 2020-12-16 NOTE — PROGRESS NOTES
HPI: Teresa Bates is an 89 year old female with history of acoustic neuroma, rhabdomyolysis, and osteopenia who was admitted on 11/9/2020 s/p unwitnessed fall at home. Imaging revealed an acute T8 compression fracture and chronic L1 compression fracture. She was placed in a TLSO brace and was recommended to follow up in 6 weeks. She states she has had back and rib discomfort up until very recently. She continues to wear the brace as directed. No new or worsening symptoms.      Medical, surgical, family, and social history unchanged since prior exam.    ROS: 10 point ROS neg other than the symptoms noted above in the HPI.    Imaging: AP and lateral films reveal T8 and L1 compression fractures.    Assessment/Plan:  T8 compression fracture    Patient Instructions   -Continue to wear the brace as instructed.  -Follow up in 6 weeks with thoracic xray prior.   -Please contact our clinic with questions or concerns at [phone].        Phone call start: 1328  Phone call end: 1336  Phone call duration: 8 minutes    Annamarie Esquivel [facility]  [address] [place] [address]  Tel [phone]  Fax [phone]

## 2020-12-16 NOTE — NURSING NOTE
"Teresa Bates is a 89 year old female who is being evaluated via a billable telephone visit.      The patient has been notified of following:     \"This telephone visit will be conducted via a call between you and your physician/provider. We have found that certain health care needs can be provided without the need for a physical exam.  This service lets us provide the care you need with a short phone conversation.  If a prescription is necessary we can send it directly to your pharmacy.  If lab work is needed we can place an order for that and you can then stop by our lab to have the test done at a later time.    Telephone visits are billed at different rates depending on your insurance coverage. During this emergency period, for some insurers they may be billed the same as an in-person visit.  Please reach out to your insurance provider with any questions.    If during the course of the call the physician/provider feels a telephone visit is not appropriate, you will not be charged for this service.\"    Patient has given verbal consent for Telephone visit?  Yes    What phone number would you like to be contacted at? [phone]    How would you like to obtain your AVS? Mail a copy    Lc Hahn CMA on 12/16/2020 at 1:24 PM    "

## 2020-12-16 NOTE — PATIENT INSTRUCTIONS
-Continue to wear the brace as instructed.  -Follow up in 6 weeks with thoracic xray prior.   -Please contact our clinic with questions or concerns at [phone].

## 2020-12-21 ENCOUNTER — PATIENT OUTREACH (OUTPATIENT)
Dept: NURSING | Facility: CLINIC | Age: 85
End: 2020-12-21
Payer: MEDICARE

## 2020-12-21 ASSESSMENT — ACTIVITIES OF DAILY LIVING (ADL): DEPENDENT_IADLS:: SHOPPING;TRANSPORTATION

## 2020-12-21 NOTE — LETTER
[facility]  [address] [place] [address]    December 21, 2020    Teresa Bates  [address]   [place] [address]      Dear Teresa,    I am a clinic care coordinator who works with Shravan Bourgeois MD at [facility]. I wanted to thank you for spending the time to talk with me.  Below is a description of clinic care coordination and how I can further assist you.      The clinic care coordination team is made up of a registered nurse,  and community health worker who understand the health care system. The goal of clinic care coordination is to help you manage your health and improve access to the health care system in the most efficient manner. The team can assist you in meeting your health care goals by providing education, coordinating services, strengthening the communication among your providers and supporting you with any resource needs.    Please let me know if there is anything I can do to support your transition to an Assisted Living Facility or feel free to give my phone number to your son if there is anything I can do to help.    Please feel free to contact me at [phone] with any questions or concerns. We are focused on providing you with the highest-quality healthcare experience possible and that all starts with you.     Sincerely,     SAMANTHA Deshpande

## 2020-12-21 NOTE — PROGRESS NOTES
Clinic Care Coordination Contact    Clinic Care Coordination Contact  OUTREACH    Referral Information:  Referral Source: IP Report    Primary Diagnosis: Psychosocial    Chief Complaint   Patient presents with     Clinic Care Coordination - Initial        Universal Utilization: Hospitalized for mechanical fall 11/9-11/13. [facility] at [facility] until 12/[facility].  Clinic Utilization  Difficulty keeping appointments:: No  Compliance Concerns: No  No-Show Concerns: No  No PCP office visit in Past Year: No  Utilization    Last refreshed: 12/20/2020  1:29 PM: Hospital Admissions 2           Last refreshed: 12/20/2020  1:29 PM: ED Visits 2           Last refreshed: 12/20/2020  1:29 PM: No Show Count (past year) 0              Current as of: 12/20/2020  1:29 PM            Clinical Concerns:  KARUNA KOVACS received an email from [facility] SW explained that pt was being discharged back home against the advice of medical provider that recommended 24 hour care. Pt discharged with [facility] for RN, PT, OT, and HHA. [facility] explored ongoing in-home services but this was declined by agency due to safety concerns.    KARUNA KOVACS spoke with pt regarding her overall wellbeing. Pt stated that she is not doing well and has taken a turn for the worse. She explained that she will be moving to an [facility] and her son is assisting with this. KARUNA KOVACS requested to speak with son about any questions he had or support he needed in this transition and pt was insistent that KARUNA KOVACS not call son because it would be bothersome to him.     KARUNA KOVACS attempted to assess for safety with pt and concerns she is experiencing. Pt stated she was not interested in speaking with KARUNA KOVACS at this time. KARUNA KOVACS requested a better time to speak and pt declined a return call from KARUNA KOVACS.    Current Medical Concerns:  Compression fracture of T8 and L1 vertebra  Current Behavioral Concerns: none    Education Provided to patient: CC yaima   Pain  Pain (GOAL):: No  Health Maintenance Reviewed: Due/Overdue    Clinical Pathway: None    Medication Management:  Not discussed    Functional Status:  Dependent ADLs:: Independent  Dependent IADLs:: Shopping, Transportation  Bed or wheelchair confined:: No  Mobility Status: Independent w/Device  Fallen 2 or more times in the past year?: No  Any fall with injury in the past year?: Yes    Living Situation:  Current living arrangement:: I live alone    Lifestyle & Psychosocial Needs:  Lifestyle     Physical activity     Days per week: Not on file     Minutes per session: Not on file     Stress: Not at all     Social Needs     Financial resource strain: Not hard at all     Food insecurity     Worry: Never true     Inability: Never true     Transportation needs     Medical: No     Non-medical: No     Diet:: Regular  Inadequate nutrition (GOAL):: No  Tube Feeding: No  Inadequate activity/exercise (GOAL):: No  Significant changes in sleep pattern (GOAL): No  Transportation means:: Family     Lutheran or spiritual beliefs that impact treatment:: No  Mental health DX:: No  Mental health management concern (GOAL):: No  Chemical Dependency Status: No Current Concerns  Informal Support system:: Children, Family   Socioeconomic History     Marital status:      Spouse name: Not on file     Number of children: Not on file     Years of education: Not on file     Highest education level: Not on file   Relationships     Social connections     Talks on phone: Twice a week     Gets together: Once a week     Attends Restorationism service: Never     Active member of club or organization: No     Attends meetings of clubs or organizations: Never     Relationship status: Not on file     Intimate partner violence     Fear of current or ex partner: Not on file     Emotionally abused: Not on file     Physically abused: Not on file     Forced sexual activity: Not on file     Tobacco Use     Smoking status: Former Smoker     Packs/day: 1.00     Years: 30.00     Pack years: 30.00     Types: Cigarettes      Smokeless tobacco: Never Used   Substance and Sexual Activity     Alcohol use: No     Alcohol/week: 0.0 standard drinks     Drug use: No     Sexual activity: Not Currently     Partners: Male      Resources and Interventions:  Current Resources:    Community Resources: None  Supplies used at home:: None  Equipment Currently Used at Home: walker, rolling  Employment Status: retired)   )    Advance Care Plan/Directive  Advanced Care Plans/Directives on file:: No  Type Advanced Care Plans/Directives: DNR/DNI    Referrals Placed: None     Patient/Caregiver understanding: Pt reports understanding and denies any additional questions or concerns at this times. SW CC engaged in AIDET communication during encounter.     Future Appointments              In 3 weeks Shravan Bourgeois MD [facility] [place]        Plan: CC SW will outreach to SW at TCU to ensure that pt's son is involved in pt's care.    Yuliana Castellanos [facility]  Clinic Care Coordinator  [facility] [place]  [facility]  [phone]  [email or web address]

## 2020-12-21 NOTE — PROGRESS NOTES
Clinic Care Coordination Contact  Care Team Conversations    KARUNA KOVACS spoke with [facility] Intake. HC was scheduled to seeing HC on 12/19 but she ultimately refused the appointment because she didn't want to be seen on the weekend. Assessment was rescheduled for 12/23/2020 but no time is set yet.     KARUNA KOVACS provided contact information and requested to receive phone call if pt again refuses HC at that time or if there are any questions/concerns.     Plan: KARUNA KOVACS will await call from Home Care.     Yuliana Castellanos [facility]  Clinic Care Coordinator  [facility] [place]  [facility]  [phone]  [email or web address]

## 2020-12-21 NOTE — PROGRESS NOTES
Clinic Care Coordination Contact  Care Team Conversations    KARUNA KOVACS spoke with Maureen with [facility] TCU regarding pt discharge on 12/18. Maureen shared that she spoke in depth with pt's son-in-law Kendal Colindres. Maureen shared that Kendal was in full agreement with TCU's assessment that pt was in need of 24 hour care and was aware of the limited support that could be offered to pt at home. Pt was accepted into [facility]. Maureen also shared that a [facility] report was filed upon discharge for self-neglect.     Plan: KARUNA KOVACS will outreach to Home Care to discuss concerns to ensure pt is receiving the support she needs.    Yuliana Castellanos [facility]  Clinic Care Coordinator  [facility] Tiara [place]  [facility]  [phone]  [email or web address]

## 2020-12-30 ENCOUNTER — PATIENT OUTREACH (OUTPATIENT)
Dept: CARE COORDINATION | Facility: CLINIC | Age: 85
End: 2020-12-30

## 2020-12-30 NOTE — PROGRESS NOTES
Clinic Care Coordination Contact  Care Team Conversations    CC SW received call from [facility]. They have been unable to get in touch with pt since initial conversation in the TCU when pt agreed to services. Pt decline scheduled visit upon discharge. Pt now has not returned any calls to HC. HC spoke with pt's son, he referred them to speak with Pat, pt's son-in-law. Pat referred HC to speak with pt for scheduling.     At this time, Home Care referral will close due to inability to schedule assessment.    Plan: KARUNA KOVACS will outreach to pt in 3-5 days.    Yuliana Castellanos [facility]  Clinic Care Coordinator  [facility] Women's [facility] Tiara [place]  [facility]  [phone]  [email or web address]

## 2020-12-30 NOTE — PROGRESS NOTES
Clinic Care Coordination Contact  [facility]/Voicemail    Referral Source: IP Report  Clinical Data: Care Coordinator Outreach    Outreach attempted x 1.  Left message on patient's voicemail with call back information and requested return call.    Plan: Care Coordinator will try to reach patient again in 3-5 business days.    Yuliana Castellanos [facility]  Clinic Care Coordinator  [facility] Women[facility] Tiara [place]  [facility]  [phone]  [email or web address]

## 2020-12-30 NOTE — PROGRESS NOTES
Clinic Care Coordination Contact  Care Team Conversations    CC FERMÍN spoke with pt's daughter briefly. She shared that Pat (son-in-law) calls pt every morning and evening. Pt shares that she is doing well at home.    Plan: KARUNA KOVACS will await return call from pt.    Yuliana Castellanos, [facility]  Clinic Care Coordinator  [facility] Women's [facility] Tiara [place]  [facility]  [phone]  [email or web address]

## 2020-12-30 NOTE — PROGRESS NOTES
Clinic Care Coordination Contact  Care Team Conversations    CC SW spoke with [facility]. They shared that they have called pt on 12/23 and 12/24 to set up a visit but pt has not returned their calls. They will try pt again today and then will close.    Plan: KARUNA KOVACS will outreach to pt to assess for additional needs.    Yuliana Castellanos [facility]  Clinic Care Coordinator  [facility] Women[facility] Tiara [place]  [facility]  [phone]  [email or web address]

## 2021-01-01 ENCOUNTER — NURSE TRIAGE (OUTPATIENT)
Dept: FAMILY MEDICINE | Facility: CLINIC | Age: 86
End: 2021-01-01

## 2021-01-01 ENCOUNTER — PATIENT OUTREACH (OUTPATIENT)
Dept: NURSING | Facility: CLINIC | Age: 86
End: 2021-01-01
Payer: MEDICARE

## 2021-01-01 ENCOUNTER — APPOINTMENT (OUTPATIENT)
Dept: SPEECH THERAPY | Facility: CLINIC | Age: 86
DRG: 177 | End: 2021-01-01
Payer: MEDICARE

## 2021-01-01 ENCOUNTER — TELEPHONE (OUTPATIENT)
Dept: FAMILY MEDICINE | Facility: CLINIC | Age: 86
End: 2021-01-01
Payer: MEDICARE

## 2021-01-01 ENCOUNTER — APPOINTMENT (OUTPATIENT)
Dept: GENERAL RADIOLOGY | Facility: CLINIC | Age: 86
DRG: 177 | End: 2021-01-01
Attending: EMERGENCY MEDICINE
Payer: MEDICARE

## 2021-01-01 ENCOUNTER — PATIENT OUTREACH (OUTPATIENT)
Dept: CARE COORDINATION | Facility: CLINIC | Age: 86
End: 2021-01-01

## 2021-01-01 ENCOUNTER — TELEPHONE (OUTPATIENT)
Dept: FAMILY MEDICINE | Facility: CLINIC | Age: 86
End: 2021-01-01

## 2021-01-01 ENCOUNTER — PATIENT OUTREACH (OUTPATIENT)
Dept: CARE COORDINATION | Facility: CLINIC | Age: 86
End: 2021-01-01
Payer: MEDICARE

## 2021-01-01 ENCOUNTER — MEDICAL CORRESPONDENCE (OUTPATIENT)
Dept: HEALTH INFORMATION MANAGEMENT | Facility: CLINIC | Age: 86
End: 2021-01-01
Payer: MEDICARE

## 2021-01-01 ENCOUNTER — MEDICAL CORRESPONDENCE (OUTPATIENT)
Dept: HEALTH INFORMATION MANAGEMENT | Facility: CLINIC | Age: 86
End: 2021-01-01

## 2021-01-01 ENCOUNTER — OFFICE VISIT (OUTPATIENT)
Dept: FAMILY MEDICINE | Facility: CLINIC | Age: 86
End: 2021-01-01
Payer: MEDICARE

## 2021-01-01 ENCOUNTER — APPOINTMENT (OUTPATIENT)
Dept: SPEECH THERAPY | Facility: CLINIC | Age: 86
DRG: 177 | End: 2021-01-01
Attending: INTERNAL MEDICINE
Payer: MEDICARE

## 2021-01-01 ENCOUNTER — DOCUMENTATION ONLY (OUTPATIENT)
Dept: OTHER | Facility: CLINIC | Age: 86
End: 2021-01-01
Payer: MEDICARE

## 2021-01-01 ENCOUNTER — HOSPITAL ENCOUNTER (INPATIENT)
Facility: CLINIC | Age: 86
LOS: 5 days | Discharge: HOSPICE | DRG: 177 | End: 2021-12-15
Attending: EMERGENCY MEDICINE | Admitting: INTERNAL MEDICINE
Payer: MEDICARE

## 2021-01-01 ENCOUNTER — APPOINTMENT (OUTPATIENT)
Dept: CT IMAGING | Facility: CLINIC | Age: 86
DRG: 177 | End: 2021-01-01
Attending: INTERNAL MEDICINE
Payer: MEDICARE

## 2021-01-01 ENCOUNTER — TELEPHONE (OUTPATIENT)
Dept: CARE COORDINATION | Facility: CLINIC | Age: 86
End: 2021-01-01

## 2021-01-01 ENCOUNTER — VIRTUAL VISIT (OUTPATIENT)
Dept: FAMILY MEDICINE | Facility: CLINIC | Age: 86
End: 2021-01-01
Payer: MEDICARE

## 2021-01-01 ENCOUNTER — DOCUMENTATION ONLY (OUTPATIENT)
Dept: OTHER | Facility: CLINIC | Age: 86
End: 2021-01-01

## 2021-01-01 ENCOUNTER — LAB REQUISITION (OUTPATIENT)
Dept: LAB | Facility: CLINIC | Age: 86
End: 2021-01-01
Payer: MEDICARE

## 2021-01-01 VITALS
HEIGHT: 65 IN | BODY MASS INDEX: 22 KG/M2 | OXYGEN SATURATION: 96 % | DIASTOLIC BLOOD PRESSURE: 74 MMHG | HEART RATE: 99 BPM | WEIGHT: 132.06 LBS | TEMPERATURE: 98.3 F | SYSTOLIC BLOOD PRESSURE: 115 MMHG | RESPIRATION RATE: 16 BRPM

## 2021-01-01 VITALS
HEART RATE: 90 BPM | TEMPERATURE: 97.1 F | SYSTOLIC BLOOD PRESSURE: 133 MMHG | OXYGEN SATURATION: 97 % | DIASTOLIC BLOOD PRESSURE: 87 MMHG

## 2021-01-01 DIAGNOSIS — R62.7 ADULT FAILURE TO THRIVE: Primary | ICD-10-CM

## 2021-01-01 DIAGNOSIS — L89.159 DECUBITUS ULCER OF COCCYGEAL REGION, UNSPECIFIED PRESSURE ULCER STAGE: ICD-10-CM

## 2021-01-01 DIAGNOSIS — U07.1 PNEUMONIA DUE TO 2019 NOVEL CORONAVIRUS: ICD-10-CM

## 2021-01-01 DIAGNOSIS — R29.898 WEAKNESS OF BOTH LOWER EXTREMITIES: ICD-10-CM

## 2021-01-01 DIAGNOSIS — R41.82 ALTERED MENTAL STATUS, UNSPECIFIED ALTERED MENTAL STATUS TYPE: ICD-10-CM

## 2021-01-01 DIAGNOSIS — Z53.9 DIAGNOSIS NOT YET DEFINED: Primary | ICD-10-CM

## 2021-01-01 DIAGNOSIS — L03.119 CELLULITIS OF LOWER EXTREMITY, UNSPECIFIED LATERALITY: Primary | ICD-10-CM

## 2021-01-01 DIAGNOSIS — E03.9 HYPOTHYROIDISM, UNSPECIFIED TYPE: ICD-10-CM

## 2021-01-01 DIAGNOSIS — D33.3 ACOUSTIC NEUROMA (H): ICD-10-CM

## 2021-01-01 DIAGNOSIS — R09.02 HYPOXIA: ICD-10-CM

## 2021-01-01 DIAGNOSIS — L89.159 PRESSURE INJURY OF SKIN OF SACRAL REGION, UNSPECIFIED INJURY STAGE: ICD-10-CM

## 2021-01-01 DIAGNOSIS — J12.82 PNEUMONIA DUE TO 2019 NOVEL CORONAVIRUS: ICD-10-CM

## 2021-01-01 DIAGNOSIS — R29.6 FALLS FREQUENTLY: Primary | ICD-10-CM

## 2021-01-01 DIAGNOSIS — I87.8 VENOUS STASIS: Primary | ICD-10-CM

## 2021-01-01 DIAGNOSIS — U07.1 COVID-19: ICD-10-CM

## 2021-01-01 DIAGNOSIS — S22.060S COMPRESSION FRACTURE OF T8 VERTEBRA, SEQUELA: ICD-10-CM

## 2021-01-01 LAB
ALBUMIN SERPL-MCNC: 2.9 G/DL (ref 3.4–5)
ALBUMIN UR-MCNC: 100 MG/DL
ALP SERPL-CCNC: 75 U/L (ref 40–150)
ALT SERPL W P-5'-P-CCNC: 28 U/L (ref 0–50)
ANION GAP SERPL CALCULATED.3IONS-SCNC: 5 MMOL/L (ref 3–14)
ANION GAP SERPL CALCULATED.3IONS-SCNC: 7 MMOL/L (ref 3–14)
ANION GAP SERPL CALCULATED.3IONS-SCNC: 7 MMOL/L (ref 3–14)
APPEARANCE UR: CLEAR
AST SERPL W P-5'-P-CCNC: 34 U/L (ref 0–45)
ATRIAL RATE - MUSE: 96 BPM
BACTERIA BLD CULT: NO GROWTH
BACTERIA BLD CULT: NO GROWTH
BACTERIA UR CULT: NO GROWTH
BASE EXCESS BLDV CALC-SCNC: 3.4 MMOL/L (ref -7.7–1.9)
BASOPHILS # BLD AUTO: 0 10E3/UL (ref 0–0.2)
BASOPHILS NFR BLD AUTO: 0 %
BILIRUB SERPL-MCNC: 0.7 MG/DL (ref 0.2–1.3)
BILIRUB UR QL STRIP: NEGATIVE
BUN SERPL-MCNC: 15 MG/DL (ref 7–30)
BUN SERPL-MCNC: 16 MG/DL (ref 7–30)
BUN SERPL-MCNC: 16 MG/DL (ref 7–30)
BUN SERPL-MCNC: 20 MG/DL (ref 7–30)
BUN SERPL-MCNC: 21 MG/DL (ref 7–30)
CALCIUM SERPL-MCNC: 7.8 MG/DL (ref 8.5–10.1)
CALCIUM SERPL-MCNC: 7.9 MG/DL (ref 8.5–10.1)
CALCIUM SERPL-MCNC: 8 MG/DL (ref 8.5–10.1)
CALCIUM SERPL-MCNC: 8.2 MG/DL (ref 8.5–10.1)
CALCIUM SERPL-MCNC: 8.9 MG/DL (ref 8.5–10.1)
CHLORIDE BLD-SCNC: 100 MMOL/L (ref 94–109)
CHLORIDE BLD-SCNC: 105 MMOL/L (ref 94–109)
CHLORIDE BLD-SCNC: 105 MMOL/L (ref 94–109)
CHLORIDE BLD-SCNC: 106 MMOL/L (ref 94–109)
CHLORIDE BLD-SCNC: 107 MMOL/L (ref 94–109)
CO2 SERPL-SCNC: 24 MMOL/L (ref 20–32)
CO2 SERPL-SCNC: 24 MMOL/L (ref 20–32)
CO2 SERPL-SCNC: 25 MMOL/L (ref 20–32)
CO2 SERPL-SCNC: 25 MMOL/L (ref 20–32)
CO2 SERPL-SCNC: 26 MMOL/L (ref 20–32)
COLOR UR AUTO: YELLOW
CREAT SERPL-MCNC: 0.51 MG/DL (ref 0.52–1.04)
CREAT SERPL-MCNC: 0.54 MG/DL (ref 0.52–1.04)
CREAT SERPL-MCNC: 0.54 MG/DL (ref 0.52–1.04)
CREAT SERPL-MCNC: 0.55 MG/DL (ref 0.52–1.04)
CREAT SERPL-MCNC: 0.56 MG/DL (ref 0.52–1.04)
CRP SERPL-MCNC: 20 MG/L (ref 0–8)
CRP SERPL-MCNC: 24.2 MG/L (ref 0–8)
CRP SERPL-MCNC: 33.3 MG/L (ref 0–8)
CRP SERPL-MCNC: 61.8 MG/L (ref 0–8)
CRP SERPL-MCNC: 80.1 MG/L (ref 0–8)
D DIMER PPP FEU-MCNC: 0.52 UG/ML FEU (ref 0–0.5)
D DIMER PPP FEU-MCNC: 0.58 UG/ML FEU (ref 0–0.5)
D DIMER PPP FEU-MCNC: 0.85 UG/ML FEU (ref 0–0.5)
D DIMER PPP FEU-MCNC: 0.9 UG/ML FEU (ref 0–0.5)
D DIMER PPP FEU-MCNC: 1.01 UG/ML FEU (ref 0–0.5)
DIASTOLIC BLOOD PRESSURE - MUSE: NORMAL MMHG
EOSINOPHIL # BLD AUTO: 0 10E3/UL (ref 0–0.7)
EOSINOPHIL NFR BLD AUTO: 0 %
ERYTHROCYTE [DISTWIDTH] IN BLOOD BY AUTOMATED COUNT: 15.6 % (ref 10–15)
ERYTHROCYTE [DISTWIDTH] IN BLOOD BY AUTOMATED COUNT: 15.9 % (ref 10–15)
ERYTHROCYTE [DISTWIDTH] IN BLOOD BY AUTOMATED COUNT: 15.9 % (ref 10–15)
ERYTHROCYTE [DISTWIDTH] IN BLOOD BY AUTOMATED COUNT: 16 % (ref 10–15)
ERYTHROCYTE [DISTWIDTH] IN BLOOD BY AUTOMATED COUNT: 16.1 % (ref 10–15)
FIBRINOGEN PPP-MCNC: 433 MG/DL (ref 170–490)
FIBRINOGEN PPP-MCNC: 433 MG/DL (ref 170–490)
FIBRINOGEN PPP-MCNC: 449 MG/DL (ref 170–490)
FIBRINOGEN PPP-MCNC: 457 MG/DL (ref 170–490)
GFR SERPL CREATININE-BSD FRML MDRD: 82 ML/MIN/1.73M2
GFR SERPL CREATININE-BSD FRML MDRD: 83 ML/MIN/1.73M2
GFR SERPL CREATININE-BSD FRML MDRD: 85 ML/MIN/1.73M2
GLUCOSE BLD-MCNC: 103 MG/DL (ref 70–99)
GLUCOSE BLD-MCNC: 105 MG/DL (ref 70–99)
GLUCOSE BLD-MCNC: 126 MG/DL (ref 70–99)
GLUCOSE BLD-MCNC: 93 MG/DL (ref 70–99)
GLUCOSE BLD-MCNC: 99 MG/DL (ref 70–99)
GLUCOSE UR STRIP-MCNC: NEGATIVE MG/DL
HCO3 BLDV-SCNC: 28 MMOL/L (ref 21–28)
HCT VFR BLD AUTO: 39.8 % (ref 35–47)
HCT VFR BLD AUTO: 41.4 % (ref 35–47)
HCT VFR BLD AUTO: 42.3 % (ref 35–47)
HCT VFR BLD AUTO: 42.9 % (ref 35–47)
HCT VFR BLD AUTO: 43.7 % (ref 35–47)
HGB BLD-MCNC: 13.2 G/DL (ref 11.7–15.7)
HGB BLD-MCNC: 14 G/DL (ref 11.7–15.7)
HGB BLD-MCNC: 14 G/DL (ref 11.7–15.7)
HGB BLD-MCNC: 14.5 G/DL (ref 11.7–15.7)
HGB BLD-MCNC: 14.7 G/DL (ref 11.7–15.7)
HGB UR QL STRIP: ABNORMAL
IMM GRANULOCYTES # BLD: 0.1 10E3/UL
IMM GRANULOCYTES NFR BLD: 1 %
INTERPRETATION ECG - MUSE: NORMAL
KETONES UR STRIP-MCNC: 100 MG/DL
LACTATE SERPL-SCNC: 1.2 MMOL/L (ref 0.7–2)
LEUKOCYTE ESTERASE UR QL STRIP: NEGATIVE
LYMPHOCYTES # BLD AUTO: 0.7 10E3/UL (ref 0.8–5.3)
LYMPHOCYTES NFR BLD AUTO: 8 %
MCH RBC QN AUTO: 30.6 PG (ref 26.5–33)
MCH RBC QN AUTO: 30.6 PG (ref 26.5–33)
MCH RBC QN AUTO: 30.9 PG (ref 26.5–33)
MCH RBC QN AUTO: 31.1 PG (ref 26.5–33)
MCH RBC QN AUTO: 31.3 PG (ref 26.5–33)
MCHC RBC AUTO-ENTMCNC: 33.1 G/DL (ref 31.5–36.5)
MCHC RBC AUTO-ENTMCNC: 33.2 G/DL (ref 31.5–36.5)
MCHC RBC AUTO-ENTMCNC: 33.6 G/DL (ref 31.5–36.5)
MCHC RBC AUTO-ENTMCNC: 33.8 G/DL (ref 31.5–36.5)
MCHC RBC AUTO-ENTMCNC: 33.8 G/DL (ref 31.5–36.5)
MCV RBC AUTO: 91 FL (ref 78–100)
MCV RBC AUTO: 92 FL (ref 78–100)
MCV RBC AUTO: 92 FL (ref 78–100)
MCV RBC AUTO: 93 FL (ref 78–100)
MCV RBC AUTO: 93 FL (ref 78–100)
MONOCYTES # BLD AUTO: 0.6 10E3/UL (ref 0–1.3)
MONOCYTES NFR BLD AUTO: 7 %
MUCOUS THREADS #/AREA URNS LPF: PRESENT /LPF
NEUTROPHILS # BLD AUTO: 6.7 10E3/UL (ref 1.6–8.3)
NEUTROPHILS NFR BLD AUTO: 84 %
NITRATE UR QL: NEGATIVE
NRBC # BLD AUTO: 0 10E3/UL
NRBC BLD AUTO-RTO: 0 /100
O2/TOTAL GAS SETTING VFR VENT: 90 %
P AXIS - MUSE: -4 DEGREES
PCO2 BLDV: 40 MM HG (ref 40–50)
PH BLDV: 7.45 [PH] (ref 7.32–7.43)
PH UR STRIP: 6 [PH] (ref 5–7)
PLATELET # BLD AUTO: 186 10E3/UL (ref 150–450)
PLATELET # BLD AUTO: 221 10E3/UL (ref 150–450)
PLATELET # BLD AUTO: 251 10E3/UL (ref 150–450)
PLATELET # BLD AUTO: 278 10E3/UL (ref 150–450)
PLATELET # BLD AUTO: 310 10E3/UL (ref 150–450)
PO2 BLDV: 31 MM HG (ref 25–47)
POTASSIUM BLD-SCNC: 3.6 MMOL/L (ref 3.4–5.3)
POTASSIUM BLD-SCNC: 3.6 MMOL/L (ref 3.4–5.3)
POTASSIUM BLD-SCNC: 3.7 MMOL/L (ref 3.4–5.3)
POTASSIUM BLD-SCNC: 3.7 MMOL/L (ref 3.4–5.3)
POTASSIUM BLD-SCNC: 3.8 MMOL/L (ref 3.4–5.3)
PR INTERVAL - MUSE: 172 MS
PROCALCITONIN SERPL-MCNC: <0.05 NG/ML
PROT SERPL-MCNC: 8 G/DL (ref 6.8–8.8)
QRS DURATION - MUSE: 86 MS
QT - MUSE: 358 MS
QTC - MUSE: 452 MS
R AXIS - MUSE: -64 DEGREES
RBC # BLD AUTO: 4.32 10E6/UL (ref 3.8–5.2)
RBC # BLD AUTO: 4.53 10E6/UL (ref 3.8–5.2)
RBC # BLD AUTO: 4.57 10E6/UL (ref 3.8–5.2)
RBC # BLD AUTO: 4.66 10E6/UL (ref 3.8–5.2)
RBC # BLD AUTO: 4.7 10E6/UL (ref 3.8–5.2)
RBC URINE: >182 /HPF
SARS-COV-2 RNA RESP QL NAA+PROBE: POSITIVE
SODIUM SERPL-SCNC: 133 MMOL/L (ref 133–144)
SODIUM SERPL-SCNC: 135 MMOL/L (ref 133–144)
SODIUM SERPL-SCNC: 135 MMOL/L (ref 133–144)
SODIUM SERPL-SCNC: 136 MMOL/L (ref 133–144)
SODIUM SERPL-SCNC: 137 MMOL/L (ref 133–144)
SP GR UR STRIP: 1.02 (ref 1–1.03)
SQUAMOUS EPITHELIAL: <1 /HPF
SYSTOLIC BLOOD PRESSURE - MUSE: NORMAL MMHG
T AXIS - MUSE: 54 DEGREES
T4 FREE SERPL-MCNC: 1.06 NG/DL (ref 0.76–1.46)
TSH SERPL DL<=0.005 MIU/L-ACNC: 9.05 MU/L (ref 0.4–4)
UROBILINOGEN UR STRIP-MCNC: NORMAL MG/DL
VENTRICULAR RATE- MUSE: 96 BPM
WBC # BLD AUTO: 10.2 10E3/UL (ref 4–11)
WBC # BLD AUTO: 11 10E3/UL (ref 4–11)
WBC # BLD AUTO: 16 10E3/UL (ref 4–11)
WBC # BLD AUTO: 8.1 10E3/UL (ref 4–11)
WBC # BLD AUTO: 9.1 10E3/UL (ref 4–11)
WBC URINE: 1 /HPF
YEAST #/AREA URNS HPF: ABNORMAL /HPF

## 2021-01-01 PROCEDURE — 250N000011 HC RX IP 250 OP 636: Performed by: INTERNAL MEDICINE

## 2021-01-01 PROCEDURE — 120N000001 HC R&B MED SURG/OB

## 2021-01-01 PROCEDURE — 92526 ORAL FUNCTION THERAPY: CPT | Mod: GN

## 2021-01-01 PROCEDURE — 83605 ASSAY OF LACTIC ACID: CPT | Performed by: EMERGENCY MEDICINE

## 2021-01-01 PROCEDURE — 250N000013 HC RX MED GY IP 250 OP 250 PS 637: Performed by: INTERNAL MEDICINE

## 2021-01-01 PROCEDURE — 80053 COMPREHEN METABOLIC PANEL: CPT | Performed by: EMERGENCY MEDICINE

## 2021-01-01 PROCEDURE — 80048 BASIC METABOLIC PNL TOTAL CA: CPT | Performed by: INTERNAL MEDICINE

## 2021-01-01 PROCEDURE — 81003 URINALYSIS AUTO W/O SCOPE: CPT | Performed by: EMERGENCY MEDICINE

## 2021-01-01 PROCEDURE — 82803 BLOOD GASES ANY COMBINATION: CPT | Performed by: EMERGENCY MEDICINE

## 2021-01-01 PROCEDURE — 250N000012 HC RX MED GY IP 250 OP 636 PS 637: Performed by: INTERNAL MEDICINE

## 2021-01-01 PROCEDURE — 99215 OFFICE O/P EST HI 40 MIN: CPT | Performed by: INTERNAL MEDICINE

## 2021-01-01 PROCEDURE — 99214 OFFICE O/P EST MOD 30 MIN: CPT | Mod: 95 | Performed by: NURSE PRACTITIONER

## 2021-01-01 PROCEDURE — U0003 INFECTIOUS AGENT DETECTION BY NUCLEIC ACID (DNA OR RNA); SEVERE ACUTE RESPIRATORY SYNDROME CORONAVIRUS 2 (SARS-COV-2) (CORONAVIRUS DISEASE [COVID-19]), AMPLIFIED PROBE TECHNIQUE, MAKING USE OF HIGH THROUGHPUT TECHNOLOGIES AS DESCRIBED BY CMS-2020-01-R: HCPCS | Mod: ORL | Performed by: NURSE PRACTITIONER

## 2021-01-01 PROCEDURE — 250N000009 HC RX 250: Performed by: INTERNAL MEDICINE

## 2021-01-01 PROCEDURE — 99223 1ST HOSP IP/OBS HIGH 75: CPT | Mod: AI | Performed by: INTERNAL MEDICINE

## 2021-01-01 PROCEDURE — 99239 HOSP IP/OBS DSCHRG MGMT >30: CPT | Performed by: INTERNAL MEDICINE

## 2021-01-01 PROCEDURE — G0463 HOSPITAL OUTPT CLINIC VISIT: HCPCS

## 2021-01-01 PROCEDURE — 92610 EVALUATE SWALLOWING FUNCTION: CPT | Mod: GN

## 2021-01-01 PROCEDURE — 99291 CRITICAL CARE FIRST HOUR: CPT | Mod: 25

## 2021-01-01 PROCEDURE — 258N000003 HC RX IP 258 OP 636: Performed by: INTERNAL MEDICINE

## 2021-01-01 PROCEDURE — 99232 SBSQ HOSP IP/OBS MODERATE 35: CPT | Performed by: INTERNAL MEDICINE

## 2021-01-01 PROCEDURE — 85379 FIBRIN DEGRADATION QUANT: CPT | Performed by: INTERNAL MEDICINE

## 2021-01-01 PROCEDURE — 84145 PROCALCITONIN (PCT): CPT | Performed by: INTERNAL MEDICINE

## 2021-01-01 PROCEDURE — 92526 ORAL FUNCTION THERAPY: CPT | Mod: GN | Performed by: SPEECH-LANGUAGE PATHOLOGIST

## 2021-01-01 PROCEDURE — U0005 INFEC AGEN DETEC AMPLI PROBE: HCPCS | Mod: ORL

## 2021-01-01 PROCEDURE — 85027 COMPLETE CBC AUTOMATED: CPT | Performed by: INTERNAL MEDICINE

## 2021-01-01 PROCEDURE — 36415 COLL VENOUS BLD VENIPUNCTURE: CPT | Performed by: INTERNAL MEDICINE

## 2021-01-01 PROCEDURE — 96361 HYDRATE IV INFUSION ADD-ON: CPT

## 2021-01-01 PROCEDURE — 96365 THER/PROPH/DIAG IV INF INIT: CPT

## 2021-01-01 PROCEDURE — 86140 C-REACTIVE PROTEIN: CPT | Performed by: INTERNAL MEDICINE

## 2021-01-01 PROCEDURE — G0180 MD CERTIFICATION HHA PATIENT: HCPCS | Performed by: INTERNAL MEDICINE

## 2021-01-01 PROCEDURE — 87086 URINE CULTURE/COLONY COUNT: CPT | Performed by: EMERGENCY MEDICINE

## 2021-01-01 PROCEDURE — 71045 X-RAY EXAM CHEST 1 VIEW: CPT

## 2021-01-01 PROCEDURE — 85384 FIBRINOGEN ACTIVITY: CPT | Performed by: INTERNAL MEDICINE

## 2021-01-01 PROCEDURE — 85025 COMPLETE CBC W/AUTO DIFF WBC: CPT | Performed by: EMERGENCY MEDICINE

## 2021-01-01 PROCEDURE — 84439 ASSAY OF FREE THYROXINE: CPT | Performed by: INTERNAL MEDICINE

## 2021-01-01 PROCEDURE — G0179 MD RECERTIFICATION HHA PT: HCPCS | Performed by: INTERNAL MEDICINE

## 2021-01-01 PROCEDURE — 87040 BLOOD CULTURE FOR BACTERIA: CPT | Performed by: EMERGENCY MEDICINE

## 2021-01-01 PROCEDURE — 99442 PR PHYSICIAN TELEPHONE EVALUATION 11-20 MIN: CPT | Mod: 95 | Performed by: INTERNAL MEDICINE

## 2021-01-01 PROCEDURE — 96375 TX/PRO/DX INJ NEW DRUG ADDON: CPT

## 2021-01-01 PROCEDURE — 999N000111 HC STATISTIC OT IP EVAL DEFER: Performed by: OCCUPATIONAL THERAPIST

## 2021-01-01 PROCEDURE — 74176 CT ABD & PELVIS W/O CONTRAST: CPT | Mod: MG

## 2021-01-01 PROCEDURE — 93005 ELECTROCARDIOGRAM TRACING: CPT

## 2021-01-01 PROCEDURE — 258N000003 HC RX IP 258 OP 636: Performed by: EMERGENCY MEDICINE

## 2021-01-01 PROCEDURE — 99207 PR MOONLIGHTING INDICATOR: CPT | Performed by: INTERNAL MEDICINE

## 2021-01-01 PROCEDURE — XW033E5 INTRODUCTION OF REMDESIVIR ANTI-INFECTIVE INTO PERIPHERAL VEIN, PERCUTANEOUS APPROACH, NEW TECHNOLOGY GROUP 5: ICD-10-PCS | Performed by: INTERNAL MEDICINE

## 2021-01-01 PROCEDURE — 84443 ASSAY THYROID STIM HORMONE: CPT | Performed by: INTERNAL MEDICINE

## 2021-01-01 PROCEDURE — 250N000011 HC RX IP 250 OP 636: Performed by: EMERGENCY MEDICINE

## 2021-01-01 PROCEDURE — 36415 COLL VENOUS BLD VENIPUNCTURE: CPT | Performed by: EMERGENCY MEDICINE

## 2021-01-01 RX ORDER — ONDANSETRON 4 MG/1
4 TABLET, ORALLY DISINTEGRATING ORAL EVERY 6 HOURS PRN
Status: DISCONTINUED | OUTPATIENT
Start: 2021-01-01 | End: 2021-01-01 | Stop reason: HOSPADM

## 2021-01-01 RX ORDER — ACETAMINOPHEN 325 MG/1
650 TABLET ORAL EVERY 6 HOURS PRN
Status: DISCONTINUED | OUTPATIENT
Start: 2021-01-01 | End: 2021-01-01 | Stop reason: HOSPADM

## 2021-01-01 RX ORDER — DEXTROSE MONOHYDRATE, SODIUM CHLORIDE, AND POTASSIUM CHLORIDE 50; 1.49; 4.5 G/1000ML; G/1000ML; G/1000ML
INJECTION, SOLUTION INTRAVENOUS CONTINUOUS
Status: DISCONTINUED | OUTPATIENT
Start: 2021-01-01 | End: 2021-01-01

## 2021-01-01 RX ORDER — ONDANSETRON 2 MG/ML
4 INJECTION INTRAMUSCULAR; INTRAVENOUS EVERY 6 HOURS PRN
Status: DISCONTINUED | OUTPATIENT
Start: 2021-01-01 | End: 2021-01-01 | Stop reason: HOSPADM

## 2021-01-01 RX ORDER — POLYETHYLENE GLYCOL 400 2.5 MG/ML
1 SOLUTION/ DROPS OPHTHALMIC 4 TIMES DAILY PRN
COMMUNITY

## 2021-01-01 RX ORDER — PROCHLORPERAZINE 25 MG
12.5 SUPPOSITORY, RECTAL RECTAL EVERY 12 HOURS PRN
Status: DISCONTINUED | OUTPATIENT
Start: 2021-01-01 | End: 2021-01-01 | Stop reason: HOSPADM

## 2021-01-01 RX ORDER — SENNOSIDES 8.6 MG
1 TABLET ORAL DAILY
COMMUNITY

## 2021-01-01 RX ORDER — CEPHALEXIN 500 MG/1
500 CAPSULE ORAL 3 TIMES DAILY
Qty: 21 CAPSULE | Refills: 0 | Status: SHIPPED | OUTPATIENT
Start: 2021-01-01 | End: 2021-01-01

## 2021-01-01 RX ORDER — DEXAMETHASONE 6 MG/1
6 TABLET ORAL DAILY
Qty: 6 TABLET | Refills: 0 | Status: SHIPPED | OUTPATIENT
Start: 2021-01-01 | End: 2021-01-01

## 2021-01-01 RX ORDER — LIDOCAINE 40 MG/G
CREAM TOPICAL
Status: DISCONTINUED | OUTPATIENT
Start: 2021-01-01 | End: 2021-01-01 | Stop reason: HOSPADM

## 2021-01-01 RX ORDER — AMMONIUM LACTATE 12 G/100G
LOTION TOPICAL 2 TIMES DAILY
COMMUNITY

## 2021-01-01 RX ORDER — LEVOTHYROXINE SODIUM 75 UG/1
75 TABLET ORAL DAILY
Qty: 90 TABLET | Refills: 3 | Status: SHIPPED | OUTPATIENT
Start: 2021-01-01 | End: 2021-01-01

## 2021-01-01 RX ORDER — SENNOSIDES 8.6 MG
1 TABLET ORAL DAILY
Status: DISCONTINUED | OUTPATIENT
Start: 2021-01-01 | End: 2021-01-01 | Stop reason: HOSPADM

## 2021-01-01 RX ORDER — LEVOTHYROXINE SODIUM 75 UG/1
75 TABLET ORAL DAILY
Qty: 90 TABLET | Refills: 3 | Status: SHIPPED | OUTPATIENT
Start: 2021-01-01

## 2021-01-01 RX ORDER — EPINEPHRINE 1 MG/ML
INJECTION INTRAMUSCULAR; INTRAVENOUS; SUBCUTANEOUS
Qty: 1 ML | Refills: 10 | Status: SHIPPED | OUTPATIENT
Start: 2021-01-01

## 2021-01-01 RX ORDER — AMOXICILLIN 250 MG
2 CAPSULE ORAL 2 TIMES DAILY PRN
Status: DISCONTINUED | OUTPATIENT
Start: 2021-01-01 | End: 2021-01-01 | Stop reason: HOSPADM

## 2021-01-01 RX ORDER — DEXAMETHASONE SODIUM PHOSPHATE 10 MG/ML
6 INJECTION, SOLUTION INTRAMUSCULAR; INTRAVENOUS ONCE
Status: COMPLETED | OUTPATIENT
Start: 2021-01-01 | End: 2021-01-01

## 2021-01-01 RX ORDER — TRIAMCINOLONE ACETONIDE 1 MG/G
OINTMENT TOPICAL 2 TIMES DAILY
Qty: 80 G | Refills: 11 | Status: ON HOLD | OUTPATIENT
Start: 2021-01-01 | End: 2021-01-01

## 2021-01-01 RX ORDER — PROCHLORPERAZINE MALEATE 5 MG
5 TABLET ORAL EVERY 6 HOURS PRN
Status: DISCONTINUED | OUTPATIENT
Start: 2021-01-01 | End: 2021-01-01 | Stop reason: HOSPADM

## 2021-01-01 RX ORDER — TRIAMCINOLONE ACETONIDE 1 MG/G
CREAM TOPICAL ONCE
Status: CANCELLED | OUTPATIENT
Start: 2021-01-01 | End: 2021-01-01

## 2021-01-01 RX ORDER — AMOXICILLIN 250 MG
1 CAPSULE ORAL 2 TIMES DAILY PRN
Status: DISCONTINUED | OUTPATIENT
Start: 2021-01-01 | End: 2021-01-01 | Stop reason: HOSPADM

## 2021-01-01 RX ORDER — LEVOTHYROXINE SODIUM 75 UG/1
75 TABLET ORAL DAILY
Status: DISCONTINUED | OUTPATIENT
Start: 2021-01-01 | End: 2021-01-01 | Stop reason: HOSPADM

## 2021-01-01 RX ORDER — SODIUM CHLORIDE 9 MG/ML
INJECTION, SOLUTION INTRAVENOUS ONCE
Status: COMPLETED | OUTPATIENT
Start: 2021-01-01 | End: 2021-01-01

## 2021-01-01 RX ORDER — ACETAMINOPHEN 325 MG/1
650 TABLET ORAL ONCE
Status: COMPLETED | OUTPATIENT
Start: 2021-01-01 | End: 2021-01-01

## 2021-01-01 RX ORDER — ACETAMINOPHEN 650 MG/1
650 SUPPOSITORY RECTAL EVERY 6 HOURS PRN
Status: DISCONTINUED | OUTPATIENT
Start: 2021-01-01 | End: 2021-01-01 | Stop reason: HOSPADM

## 2021-01-01 RX ADMIN — SODIUM CHLORIDE 50 ML: 9 INJECTION, SOLUTION INTRAVENOUS at 14:25

## 2021-01-01 RX ADMIN — DEXAMETHASONE 6 MG: 2 TABLET ORAL at 13:46

## 2021-01-01 RX ADMIN — DEXAMETHASONE 6 MG: 2 TABLET ORAL at 12:50

## 2021-01-01 RX ADMIN — REMDESIVIR 100 MG: 100 INJECTION, POWDER, LYOPHILIZED, FOR SOLUTION INTRAVENOUS at 14:25

## 2021-01-01 RX ADMIN — SODIUM CHLORIDE 50 ML: 9 INJECTION, SOLUTION INTRAVENOUS at 16:08

## 2021-01-01 RX ADMIN — ENOXAPARIN SODIUM 40 MG: 40 INJECTION SUBCUTANEOUS at 13:52

## 2021-01-01 RX ADMIN — ACETAMINOPHEN 650 MG: 325 TABLET, FILM COATED ORAL at 00:52

## 2021-01-01 RX ADMIN — DEXAMETHASONE SODIUM PHOSPHATE 6 MG: 10 INJECTION INTRAMUSCULAR; INTRAVENOUS at 21:36

## 2021-01-01 RX ADMIN — SODIUM CHLORIDE: 9 INJECTION, SOLUTION INTRAVENOUS at 20:45

## 2021-01-01 RX ADMIN — LEVOTHYROXINE SODIUM 75 MCG: 75 TABLET ORAL at 06:35

## 2021-01-01 RX ADMIN — ACETAMINOPHEN 650 MG: 650 SUPPOSITORY RECTAL at 15:57

## 2021-01-01 RX ADMIN — POTASSIUM CHLORIDE, DEXTROSE MONOHYDRATE AND SODIUM CHLORIDE: 150; 5; 450 INJECTION, SOLUTION INTRAVENOUS at 00:35

## 2021-01-01 RX ADMIN — ENOXAPARIN SODIUM 40 MG: 40 INJECTION SUBCUTANEOUS at 13:05

## 2021-01-01 RX ADMIN — ONDANSETRON 4 MG: 4 TABLET, ORALLY DISINTEGRATING ORAL at 13:05

## 2021-01-01 RX ADMIN — ACETAMINOPHEN 650 MG: 325 TABLET, FILM COATED ORAL at 14:29

## 2021-01-01 RX ADMIN — ACETAMINOPHEN 650 MG: 325 TABLET, FILM COATED ORAL at 09:06

## 2021-01-01 RX ADMIN — REMDESIVIR 200 MG: 100 INJECTION, POWDER, LYOPHILIZED, FOR SOLUTION INTRAVENOUS at 12:54

## 2021-01-01 RX ADMIN — POTASSIUM CHLORIDE, DEXTROSE MONOHYDRATE AND SODIUM CHLORIDE: 150; 5; 450 INJECTION, SOLUTION INTRAVENOUS at 16:12

## 2021-01-01 RX ADMIN — SENNOSIDES 1 TABLET: 8.6 TABLET, FILM COATED ORAL at 16:34

## 2021-01-01 RX ADMIN — ACETAMINOPHEN 650 MG: 325 TABLET, FILM COATED ORAL at 13:45

## 2021-01-01 RX ADMIN — DEXAMETHASONE 6 MG: 2 TABLET ORAL at 12:05

## 2021-01-01 RX ADMIN — DEXAMETHASONE 6 MG: 2 TABLET ORAL at 13:33

## 2021-01-01 RX ADMIN — ENOXAPARIN SODIUM 40 MG: 40 INJECTION SUBCUTANEOUS at 13:15

## 2021-01-01 RX ADMIN — ENOXAPARIN SODIUM 40 MG: 40 INJECTION SUBCUTANEOUS at 13:50

## 2021-01-01 RX ADMIN — ENOXAPARIN SODIUM 40 MG: 40 INJECTION SUBCUTANEOUS at 13:33

## 2021-01-01 RX ADMIN — POTASSIUM CHLORIDE, DEXTROSE MONOHYDRATE AND SODIUM CHLORIDE: 150; 5; 450 INJECTION, SOLUTION INTRAVENOUS at 09:36

## 2021-01-01 RX ADMIN — SENNOSIDES 1 TABLET: 8.6 TABLET, FILM COATED ORAL at 09:06

## 2021-01-01 RX ADMIN — REMDESIVIR 100 MG: 100 INJECTION, POWDER, LYOPHILIZED, FOR SOLUTION INTRAVENOUS at 13:49

## 2021-01-01 RX ADMIN — LEVOTHYROXINE SODIUM 75 MCG: 75 TABLET ORAL at 16:34

## 2021-01-01 RX ADMIN — DEXAMETHASONE 6 MG: 2 TABLET ORAL at 13:14

## 2021-01-01 RX ADMIN — SODIUM CHLORIDE 50 ML: 9 INJECTION, SOLUTION INTRAVENOUS at 13:50

## 2021-01-01 ASSESSMENT — ACTIVITIES OF DAILY LIVING (ADL)
ADLS_ACUITY_SCORE: 25
ADLS_ACUITY_SCORE: 31
ADLS_ACUITY_SCORE: 25
ADLS_ACUITY_SCORE: 25
ADLS_ACUITY_SCORE: 12
ADLS_ACUITY_SCORE: 31
ADLS_ACUITY_SCORE: 31
ADLS_ACUITY_SCORE: 9
ADLS_ACUITY_SCORE: 31
ADLS_ACUITY_SCORE: 31
ADLS_ACUITY_SCORE: 27
ADLS_ACUITY_SCORE: 27
ADLS_ACUITY_SCORE: 25
ADLS_ACUITY_SCORE: 31
ADLS_ACUITY_SCORE: 25
ADLS_ACUITY_SCORE: 9
ADLS_ACUITY_SCORE: 29
ADLS_ACUITY_SCORE: 27
ADLS_ACUITY_SCORE: 31
ADLS_ACUITY_SCORE: 27
ADLS_ACUITY_SCORE: 9
ADLS_ACUITY_SCORE: 27
DEPENDENT_IADLS:: CLEANING;COOKING;LAUNDRY;SHOPPING;MEAL PREPARATION;MEDICATION MANAGEMENT;MONEY MANAGEMENT;TRANSPORTATION
ADLS_ACUITY_SCORE: 23
ADLS_ACUITY_SCORE: 25
ADLS_ACUITY_SCORE: 31
ADLS_ACUITY_SCORE: 25
ADLS_ACUITY_SCORE: 25
ADLS_ACUITY_SCORE: 27
ADLS_ACUITY_SCORE: 27
ADLS_ACUITY_SCORE: 25
ADLS_ACUITY_SCORE: 27
ADLS_ACUITY_SCORE: 23
ADLS_ACUITY_SCORE: 27
ADLS_ACUITY_SCORE: 31
ADLS_ACUITY_SCORE: 23
ADLS_ACUITY_SCORE: 9
ADLS_ACUITY_SCORE: 27
ADLS_ACUITY_SCORE: 27
ADLS_ACUITY_SCORE: 25
ADLS_ACUITY_SCORE: 23
ADLS_ACUITY_SCORE: 27
ADLS_ACUITY_SCORE: 23
ADLS_ACUITY_SCORE: 31
ADLS_ACUITY_SCORE: 25
ADLS_ACUITY_SCORE: 27
ADLS_ACUITY_SCORE: 27
ADLS_ACUITY_SCORE: 23
ADLS_ACUITY_SCORE: 12
ADLS_ACUITY_SCORE: 27
ADLS_ACUITY_SCORE: 29
ADLS_ACUITY_SCORE: 23
ADLS_ACUITY_SCORE: 9
ADLS_ACUITY_SCORE: 23
ADLS_ACUITY_SCORE: 27
ADLS_ACUITY_SCORE: 9
ADLS_ACUITY_SCORE: 23
ADLS_ACUITY_SCORE: 27
ADLS_ACUITY_SCORE: 31
ADLS_ACUITY_SCORE: 27
ADLS_ACUITY_SCORE: 31
ADLS_ACUITY_SCORE: 9
ADLS_ACUITY_SCORE: 23
ADLS_ACUITY_SCORE: 27
ADLS_ACUITY_SCORE: 31
ADLS_ACUITY_SCORE: 31
ADLS_ACUITY_SCORE: 27
ADLS_ACUITY_SCORE: 9
ADLS_ACUITY_SCORE: 23
ADLS_ACUITY_SCORE: 25
ADLS_ACUITY_SCORE: 12
ADLS_ACUITY_SCORE: 9
ADLS_ACUITY_SCORE: 27
ADLS_ACUITY_SCORE: 31
ADLS_ACUITY_SCORE: 12
ADLS_ACUITY_SCORE: 27
ADLS_ACUITY_SCORE: 25
ADLS_ACUITY_SCORE: 27
ADLS_ACUITY_SCORE: 9
ADLS_ACUITY_SCORE: 23
ADLS_ACUITY_SCORE: 12
ADLS_ACUITY_SCORE: 23
ADLS_ACUITY_SCORE: 27
ADLS_ACUITY_SCORE: 12
ADLS_ACUITY_SCORE: 27
ADLS_ACUITY_SCORE: 27
ADLS_ACUITY_SCORE: 25
ADLS_ACUITY_SCORE: 23
ADLS_ACUITY_SCORE: 25
ADLS_ACUITY_SCORE: 9
ADLS_ACUITY_SCORE: 9
ADLS_ACUITY_SCORE: 31
ADLS_ACUITY_SCORE: 27
ADLS_ACUITY_SCORE: 9
ADLS_ACUITY_SCORE: 27
ADLS_ACUITY_SCORE: 12
ADLS_ACUITY_SCORE: 25
ADLS_ACUITY_SCORE: 31
ADLS_ACUITY_SCORE: 27
ADLS_ACUITY_SCORE: 9
ADLS_ACUITY_SCORE: 27
ADLS_ACUITY_SCORE: 9
ADLS_ACUITY_SCORE: 27
ADLS_ACUITY_SCORE: 31
ADLS_ACUITY_SCORE: 23
ADLS_ACUITY_SCORE: 31
ADLS_ACUITY_SCORE: 27

## 2021-01-01 ASSESSMENT — ENCOUNTER SYMPTOMS
WEAKNESS: 1
FATIGUE: 1
SHORTNESS OF BREATH: 1

## 2021-01-01 ASSESSMENT — MIFFLIN-ST. JEOR
SCORE: 1019.88
SCORE: 1010.56

## 2021-01-07 ENCOUNTER — PATIENT OUTREACH (OUTPATIENT)
Dept: CARE COORDINATION | Facility: CLINIC | Age: 86
End: 2021-01-07

## 2021-01-07 NOTE — PROGRESS NOTES
Clinic Care Coordination Contact  [facility]/Voicemail       Clinical Data: Care Coordinator Outreach    Outreach attempted x 2.  Left message on patient's voicemail with call back information and requested return call.    Plan: Care Coordinator sent care coordination introduction letter on 12/21/2020 via mail. Care Coordinator will do no further outreaches at this time.    Yuliana Castellanos [facility]  Clinic Care Coordinator  [facility] Tiara [place]  [facility]  [phone]  [email or web address]

## 2021-01-13 ENCOUNTER — MEDICAL CORRESPONDENCE (OUTPATIENT)
Dept: HEALTH INFORMATION MANAGEMENT | Facility: CLINIC | Age: 86
End: 2021-01-13

## 2021-01-25 ENCOUNTER — RECORDS - HEALTHEAST (OUTPATIENT)
Dept: LAB | Facility: CLINIC | Age: 86
End: 2021-01-25

## 2021-01-25 LAB
ALBUMIN SERPL-MCNC: 3.5 G/DL (ref 3.5–5)
ALP SERPL-CCNC: 76 U/L (ref 45–120)
ALT SERPL W P-5'-P-CCNC: 16 U/L (ref 0–45)
ANION GAP SERPL CALCULATED.3IONS-SCNC: 11 MMOL/L (ref 5–18)
AST SERPL W P-5'-P-CCNC: 27 U/L (ref 0–40)
BASOPHILS # BLD AUTO: 0 THOU/UL (ref 0–0.2)
BASOPHILS NFR BLD AUTO: 1 % (ref 0–2)
BILIRUB SERPL-MCNC: 0.8 MG/DL (ref 0–1)
BUN SERPL-MCNC: 13 MG/DL (ref 8–28)
CALCIUM SERPL-MCNC: 8.6 MG/DL (ref 8.5–10.5)
CHLORIDE BLD-SCNC: 105 MMOL/L (ref 98–107)
CO2 SERPL-SCNC: 24 MMOL/L (ref 22–31)
CREAT SERPL-MCNC: 0.71 MG/DL (ref 0.6–1.1)
EOSINOPHIL # BLD AUTO: 0.1 THOU/UL (ref 0–0.4)
EOSINOPHIL NFR BLD AUTO: 1 % (ref 0–6)
ERYTHROCYTE [DISTWIDTH] IN BLOOD BY AUTOMATED COUNT: 15.2 % (ref 11–14.5)
GFR SERPL CREATININE-BSD FRML MDRD: >60 ML/MIN/1.73M2
GLUCOSE BLD-MCNC: 112 MG/DL (ref 70–125)
HCT VFR BLD AUTO: 41 % (ref 35–47)
HGB BLD-MCNC: 13.3 G/DL (ref 12–16)
IMM GRANULOCYTES # BLD: 0 THOU/UL
IMM GRANULOCYTES NFR BLD: 0 %
LYMPHOCYTES # BLD AUTO: 1.9 THOU/UL (ref 0.8–4.4)
LYMPHOCYTES NFR BLD AUTO: 25 % (ref 20–40)
MCH RBC QN AUTO: 32.8 PG (ref 27–34)
MCHC RBC AUTO-ENTMCNC: 32.4 G/DL (ref 32–36)
MCV RBC AUTO: 101 FL (ref 80–100)
MONOCYTES # BLD AUTO: 0.5 THOU/UL (ref 0–0.9)
MONOCYTES NFR BLD AUTO: 6 % (ref 2–10)
NEUTROPHILS # BLD AUTO: 5.3 THOU/UL (ref 2–7.7)
NEUTROPHILS NFR BLD AUTO: 68 % (ref 50–70)
PLATELET # BLD AUTO: 280 THOU/UL (ref 140–440)
PMV BLD AUTO: 10.4 FL (ref 8.5–12.5)
POTASSIUM BLD-SCNC: 4.6 MMOL/L (ref 3.5–5)
PROT SERPL-MCNC: 7.1 G/DL (ref 6–8)
RBC # BLD AUTO: 4.06 MILL/UL (ref 3.8–5.4)
SODIUM SERPL-SCNC: 140 MMOL/L (ref 136–145)
TSH SERPL DL<=0.005 MIU/L-ACNC: 20.54 UIU/ML (ref 0.3–5)
VIT B12 SERPL-MCNC: 585 PG/ML (ref 213–816)
WBC: 7.8 THOU/UL (ref 4–11)

## 2021-03-04 ENCOUNTER — RECORDS - HEALTHEAST (OUTPATIENT)
Dept: LAB | Facility: CLINIC | Age: 86
End: 2021-03-04

## 2021-03-05 LAB
ANION GAP SERPL CALCULATED.3IONS-SCNC: 9 MMOL/L (ref 5–18)
BUN SERPL-MCNC: 15 MG/DL (ref 8–28)
CALCIUM SERPL-MCNC: 8.7 MG/DL (ref 8.5–10.5)
CHLORIDE BLD-SCNC: 105 MMOL/L (ref 98–107)
CO2 SERPL-SCNC: 27 MMOL/L (ref 22–31)
CREAT SERPL-MCNC: 0.69 MG/DL (ref 0.6–1.1)
GFR SERPL CREATININE-BSD FRML MDRD: >60 ML/MIN/1.73M2
GLUCOSE BLD-MCNC: 105 MG/DL (ref 70–125)
POTASSIUM BLD-SCNC: 4.2 MMOL/L (ref 3.5–5)
SODIUM SERPL-SCNC: 141 MMOL/L (ref 136–145)
TSH SERPL DL<=0.005 MIU/L-ACNC: 3.24 UIU/ML (ref 0.3–5)

## 2021-03-11 ENCOUNTER — RECORDS - HEALTHEAST (OUTPATIENT)
Dept: LAB | Facility: CLINIC | Age: 86
End: 2021-03-11

## 2021-03-11 LAB
SARS-COV-2 PCR COMMENT: NORMAL
SARS-COV-2 RNA SPEC QL NAA+PROBE: NEGATIVE
SARS-COV-2 VIRUS SPECIMEN SOURCE: NORMAL

## 2021-03-12 LAB
BASOPHILS # BLD AUTO: 0.1 THOU/UL (ref 0–0.2)
BASOPHILS NFR BLD AUTO: 1 % (ref 0–2)
EOSINOPHIL # BLD AUTO: 0.1 THOU/UL (ref 0–0.4)
EOSINOPHIL NFR BLD AUTO: 1 % (ref 0–6)
ERYTHROCYTE [DISTWIDTH] IN BLOOD BY AUTOMATED COUNT: 15.2 % (ref 11–14.5)
HCT VFR BLD AUTO: 40.8 % (ref 35–47)
HGB BLD-MCNC: 13.2 G/DL (ref 12–16)
IMM GRANULOCYTES # BLD: 0 THOU/UL
IMM GRANULOCYTES NFR BLD: 0 %
LYMPHOCYTES # BLD AUTO: 1.6 THOU/UL (ref 0.8–4.4)
LYMPHOCYTES NFR BLD AUTO: 22 % (ref 20–40)
MCH RBC QN AUTO: 32 PG (ref 27–34)
MCHC RBC AUTO-ENTMCNC: 32.4 G/DL (ref 32–36)
MCV RBC AUTO: 99 FL (ref 80–100)
MONOCYTES # BLD AUTO: 0.6 THOU/UL (ref 0–0.9)
MONOCYTES NFR BLD AUTO: 8 % (ref 2–10)
NEUTROPHILS # BLD AUTO: 4.9 THOU/UL (ref 2–7.7)
NEUTROPHILS NFR BLD AUTO: 68 % (ref 50–70)
PLATELET # BLD AUTO: 199 THOU/UL (ref 140–440)
PMV BLD AUTO: 10.3 FL (ref 8.5–12.5)
RBC # BLD AUTO: 4.12 MILL/UL (ref 3.8–5.4)
WBC: 7.2 THOU/UL (ref 4–11)

## 2021-03-18 NOTE — TELEPHONE ENCOUNTER
Please see message below.  Do you want us to call and schedule a virtual appointment?  Shae Alvarez MA

## 2021-03-18 NOTE — TELEPHONE ENCOUNTER
PT reports she is almost out of Thyroid Meds she has taken for about 50 years, but she is in a nursing home and not mobile at this point.     She owuld like a nurse to call her back and let her know how she should proceed with this medcaiton.

## 2021-04-08 NOTE — LETTER
April 13, 2021      Teresa Bates  [address]   [place] [address]        Dear ,    We are writing to inform you of your test results.    The following letter pertains to your most recent diagnostic tests:     As we discussed by phone, I agree with the physicians at [facility] who suggested that you increase the dose of your thyroid medicine from 75 mcg to 100 mcg daily.  However, based on our discussion, you feel as if the administration time of your levothyroxine was disrupted because of your recent institutionalization.  As such, the absorption of the levothyroxine may have been altered.  You also shared with me, that your dose of levothyroxine has been stable for many years and that you do not feel comfortable increasing the dose.  As such, we both agreed, to continue with the current dose of 75 mcg daily and to recheck your thyroid function blood tests in a 2-month interval.  I look forward to speaking with you on April 19 at 3 PM for our telephone visit.  Hopefully, between then and now you can meet with a representative from hospice to learn about their program.  Hopefully, some of your family members can join in on that meeting as well.       Resulted Orders   TSH with free T4 reflex   Result Value Ref Range    TSH 9.05 (H) 0.40 - 4.00 mU/L   T4 free   Result Value Ref Range    T4 Free 1.06 0.76 - 1.46 ng/dL     If you have any questions or concerns, please call the clinic at the number listed above.     Sincerely,      Shravan Bourgeois MD     none

## 2021-04-08 NOTE — PROGRESS NOTES
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  She did seem interested in learning more about hospice as an option for her.  Unfortunately I do not think that her life expectancy is greater than 6 months.  I told her that I would ask our care coordinator to see what options she has for moving out of her current assisted living facility  - HOSPICE REFERRAL  - CARE COORDINATION REFERRAL    Acoustic neuroma (H)  This was stable when imaged in 2019, the patient does not want to have another brain scan scheduled today    Hypothyroidism, unspecified type  She probably does need a higher dose of levothyroxine, but we will recheck today  - TSH with free T4 reflex    Pressure injury of skin of sacral region, unspecified injury stage  Continue the use of barrier cream, but unless she is able to get up out of the wheelchair, this is very unlikely to be able to heal and we had a jessy discussion about this  - CARE COORDINATION REFERRAL      40 minutes spent on the date of the encounter doing chart review, history and exam, documentation and further activities per the note           Return in about 2 weeks (around 4/22/2021) for telephone appt in 1-2 weeks .  I plan to have a telephone follow-up with her after she has had a chance to meet with hospice.    Shravan Bourgeois MD  [facility] FOREST Moore is a 89 year old who presents for the following health issues     HPI     This is a very challenging situation  This is a very pleasant 89-year-old female who up until recently had been very independent  She has a history of an acoustic neuroma  She has been hospitalized recently with falls and spine  fractures  She is now basically confined to a wheelchair  Her family has moved her into an assisted living facility from what it sounds like  It also sounds like she may have been seen by in-house providers at her assisted living facility, but that this displeased her  She does not seem to be happy at her current living facility and would like to move to the [facility] home  She requests my help to facilitate that move  The history is almost impossible due to her severe hearing loss although in my previous recent telephone conversations we were able to communicate with ease  She seems upset that her levothyroxine dose was changed although she does have several elevated TSH levels noted in the epic system  She tells me that she is currently taking 75 mcg of levothyroxine from leftover prescriptions and she has been doing so for at least 1 month  She also reports a sore on her bottom and seems very concerned that the barrier cream prescribed by the in-house staff at her assisted living facility is appropriate for healing the skin ulcer  She did indicate to me that her goals are to remain at home and feel comfortable  I asked her if she wanted to go to see a neurologist or have additional test to see why she is having such difficulty walking recently  She suggested that she did not want to do so and that she want to stay in her home and out of the hospital and get help to feel comfortable at home  I introduced the concept of hospice care to her today  Much of the communication was through me typing on the computer screen because of her hearing loss        Review of Systems         Objective    There were no vitals taken for this visit.  There is no height or weight on file to calculate BMI.  Physical Exam   This is a frail, very thin, very fragile elderly female sitting in a wheelchair.  She has declined considerably since I last saw her.  She has a shallow sacral decubitus ulcer noted without obvious signs of  infection    TFTs are pending

## 2021-04-08 NOTE — PATIENT INSTRUCTIONS
Was there a time when you missed levothyroxine pills for several days in a row?  After you fell?  How much levothyroxine are you taking right now?  75mcg?  How long have you been back on 75mcg daily?  Good!!!!  We can check your blood today to make sure that dose is accurate.  WE WILL DO IT TODAY!!!  We will also have a  call you to see if you can move to [place].  I don't know if they have space available.   But if you are unhappy we can try to get you to a different place.  Do you have sores on your bottom?  Teresa, do you still wear a brace for your back????Are you all healed up?????  Did the doctors tell you that you can remove the brace?   Does you back feel better?  The problem you have is a tough problem.  The skin is breaking down from pressure.  The pressure is because you cannot stand.  I don't know why your legs have become so weak?  Yes the tube is the right cream.  The skin will not heal unless you can get up and take pressure off your bottom.  No.  There is no pillow or cream that will help.  You need to be able to get up.  If you cannot get up the problem will only get worse.  We can arrange for you to see a neurologist to see if they can tell you why your legs are so weak?   Unfortunately, the prognosis here is not very good unless they can find a reason for why you cannot/      Teresa, can we arrange for a meeting with your daughter and son in law all together so we can talk about all this.  Because things are not going well.  We can try to get a specialist to help and do scans and procedures, but I am not hopefull that all that is going to get your better.  We would need their help and involvement if we wanted to do all that.  Yes.  If you would like nurses and helpers to come to [place] to help you stay comfortable, you should think about learning about hospice care.  Hospice is a program were nurses can come to your place and help you stay comfortable without having to go  to the \[facility]\"" for cares.   It is for problems that do not have a cure.  It is for patients who do not have a great prognosis.  Unfortunately, I think you qualify for their services.      I think you should meet with a representative from hospice.  They can come to [place] and meet with you and your daughter and explain their services.   Do you want meet with hospice to learn more about their services.  I will send them out to your place.   That does not mean that you have to sign up, but you can lear about what they can do to keep you comfortable and out of the hospital.   100%%%%    Let's check your thyroid tests today.  I'll call you to help you with the dose of your levothyroxine.   I will ask Claudia to set up a telephone appointment for us in 1-2 weeks after you have had a chance to meet with hospice.  She just met you.  She will come back in the room to help you.  SHe is my assistant today.   Hospice will come to your place tomeet you.      OK?  Just outside the door, she is the one who brought you to the room.  She is coming right in to schedule your appointment and to bring you to the lab.  Then hospice will call you tomorrow.   Yes... about your thyroid test.    We'll chat soon!    YOUR RIGHT !!!!  11 years old!  5th grade!!      Your as sharp as a tack!!!!!!!!!    I have to run, but I'll talk to you soon.

## 2021-04-09 NOTE — PROGRESS NOTES
Clinic Care Coordination Contact  [facility]/Voicemail    Referral Source: PCP  Clinical Data: Care Coordinator Outreach    Outreach attempted x 1.  Left message on patient's voicemail with call back information and requested return call.    Plan: Care Coordinator will try to reach patient again in 1-2 business days.    Yuliana Castellanos [facility]  Clinic Care Coordinator  [facility] Tiara [place]  [facility]  [phone]  [email or web address]

## 2021-04-12 NOTE — PROGRESS NOTES
Clinic Care Coordination Contact    Clinic Care Coordination Contact  OUTREACH    Referral Information:  Referral Source: PCP    Primary Diagnosis: Frailty/Failure to thrive    Chief Complaint   Patient presents with     Clinic Care Coordination - Initial        Universal Utilization:   Clinic Utilization  Difficulty keeping appointments:: No  Compliance Concerns: No  No-Show Concerns: No  No PCP office visit in Past Year: No  Utilization    Last refreshed: 4/9/2021  8:34 PM: Hospital Admissions 2           Last refreshed: 4/9/2021  8:34 PM: ED Visits 2           Last refreshed: 4/9/2021  8:34 PM: No Show Count (past year) 0              Current as of: 4/9/2021  8:34 PM            Clinical Concerns:  CC SW spoke with pt regarding her overall wellbeing. Pt shared that she plans to remain in her [facility] at [facility] because she feels a move would be more work than it is worth. She explained that she receives help from the staff in her long term to transfer from her hospital chair to her wheelchair. Her dtr and son-in-law are very close and support her.     Pt has spoken with Hospice Care and a plans was made to meet with them on 4/16 with dtr and AJAY. Dtr and AJAY are very worried that pt is about to die or a new medical issue was found because hospice is being discussed. CC SW explained that based on the goals pt has to remain comfortable, at home, and she has no interest in invasive procedures; Hospice would be a good option to support those goals. Pt stated understanding and is looking forward to learning more.    Pt shared her main medical concerns are her vision isn't sharp, her hearing isn't good (although she was able to hear well on the phone), and she is unable to walk.    Current Medical Concerns:  Unable to walk, poor hearing and vision    Current Behavioral Concerns: none    Education Provided to patient: CC role   Pain  Pain (GOAL):: No  Health Maintenance Reviewed: Up to date  Clinical Pathway: None    Medication  Management:  Did not discuss at this time     Functional Status:  Dependent ADLs:: Wheelchair-with assist, Bathing, Dressing, Eating, Grooming, Toileting, Transfers  Dependent IADLs:: Medication Management, Cleaning, Cooking, Laundry, Shopping, Transportation, Meal Preparation  Bed or wheelchair confined:: Yes  Mobility Status: Dependent/Assisted by Another    Living Situation:  Current living arrangement:: I live in assisted living  Type of residence:: Assisted living    Lifestyle & Psychosocial Needs:  Lifestyle     Physical activity     Days per week: Not on file     Minutes per session: Not on file     Stress: Not at all     Social Needs     Financial resource strain: Not hard at all     Food insecurity     Worry: Never true     Inability: Never true     Transportation needs     Medical: No     Non-medical: No     Diet:: Regular  Inadequate nutrition (GOAL):: No  Tube Feeding: No  Inadequate activity/exercise (GOAL):: No  Significant changes in sleep pattern (GOAL): No  Transportation means:: Family     Zoroastrian or spiritual beliefs that impact treatment:: No  Mental health DX:: No  Mental health management concern (GOAL):: No  Chemical Dependency Status: No Current Concerns  Informal Support system:: Family, Children   Socioeconomic History     Marital status:      Spouse name: Not on file     Number of children: Not on file     Years of education: Not on file     Highest education level: Not on file   Relationships     Social connections     Talks on phone: Twice a week     Gets together: Once a week     Attends Restorationism service: Never     Active member of club or organization: No     Attends meetings of clubs or organizations: Never     Relationship status: Not on file     Intimate partner violence     Fear of current or ex partner: Not on file     Emotionally abused: Not on file     Physically abused: Not on file     Forced sexual activity: Not on file     Tobacco Use     Smoking status: Former  Smoker     Packs/day: 1.00     Years: 30.00     Pack years: 30.00     Types: Cigarettes     Smokeless tobacco: Never Used   Substance and Sexual Activity     Alcohol use: No     Alcohol/week: 0.0 standard drinks     Drug use: No     Sexual activity: Not Currently     Partners: Male      Resources and Interventions:  Current Resources:    Community Resources: None  Supplies used at home:: None  Equipment Currently Used at Home: wheelchair, manual  Employment Status: retired)   )    Advance Care Plan/Directive  Advanced Care Plans/Directives on file:: No  Type Advanced Care Plans/Directives: DNR/DNI, POLST    Referrals Placed: Hospice     Patient/Caregiver understanding: Pt reports understanding and denies any additional questions or concerns at this times. SW CC engaged in AIDET communication during encounter.    Outreach Frequency: weekly  Future Appointments              In 1 week Shravan Bourgeois MD [facility] [place]         Plan: CC SW will follow up with pt in 1 week to discuss her plan with hospice care.    Yuliana Castellanos [facility]  Clinic Care Coordinator  [facility] Women's [facility] Tiara [place]  [facility]  [phone]  [email or web address]

## 2021-04-13 NOTE — RESULT ENCOUNTER NOTE
The following letter pertains to your most recent diagnostic tests:    As we discussed by phone, I agree with the physicians at [facility] who suggested that you increase the dose of your thyroid medicine from 75 mcg to 100 mcg daily.  However, based on our discussion, you feel as if the administration time of your levothyroxine was disrupted because of your recent institutionalization.  As such, the absorption of the levothyroxine may have been altered.  You also shared with me, that your dose of levothyroxine has been stable for many years and that you do not feel comfortable increasing the dose.  As such, we both agreed, to continue with the current dose of 75 mcg daily and to recheck your thyroid function blood tests in a 2-month interval.  I look forward to speaking with you on April 19 at 3 PM for our telephone visit.  Hopefully, between then and now you can meet with a representative from hospice to learn about their program.  Hopefully, some of your family members can join in on that meeting as well.      Sincerely,    Dr. Bourgeois

## 2021-04-13 NOTE — PROGRESS NOTES
Clinic Care Coordination Contact    Follow Up Progress Note      Assessment: KARUNA KOVACS received a call from pt sharing that she received a bill for a private room at [facility] for $756. She stated that she was not aware that she would have a bill from being there. KARUNA KOVACS shared that the SW in the hospital would have explained any costs or the TCU would have confirmed this with her. Per chart review, pt was informed after day 15 she would start needing to pay for her private room. Pt stated that she didn't know of this and she struggles with her hearing and it is possible she didn't hear. KARUNA KOVACS encouraged her to speak with her family as they may have discussed this with the SW as well.    Pt inquired about what would happen if she doesn't pay. Her bill was sent to collections. She stated that she doesn't own any thing that could have a lien placed on it. She worries that it could affect her children. KARUNA KOVACS encouraged her to speak with her family about not paying and potentially she could speak with a  over the phone to inquire about this.    Pt shared that her son lives in [place] and her daughter and son-in-law are nearby. Her daughters health is poor.    Plan: KARUNA KOVACS will outreach to pt next week to discuss plan for Hospice.    Yuliana Castellanos [facility]  Clinic Care Coordinator  [facility] Tiara [place]  [facility]  [phone]  [email or web address]

## 2021-04-16 NOTE — PROGRESS NOTES
Clinic Care Coordination Contact    Follow Up Progress Note      Assessment: KARUNA KOVACS spoke with pt regarding her meeting with Hospice today. She explained it was a very good meeting but she needed to end it prior to having all of her questions answered because she needed to eat lunch. KARUNA KOVACS encouraged her to contact Hospice to request another meeting to discuss questions further. Pt shared that she has tried to call in the past but wasn't able to get through to anyone.    Plan: KARUNA KOVACS will outreach to Hospice on Monday to discuss pt having another meeting with pt to further discuss her needs. Pt stated that this would be very helpful. KARUNA KOVACS will update pt with Hospice plan on Monday.    Yuliana Castellanos [facility]  Clinic Care Coordinator  [facility] Tiara [place]  [facility]  [phone]  [email or web address]

## 2021-04-19 NOTE — PROGRESS NOTES
Clinic Care Coordination Contact  Care Team Conversations    CC FERMÍN spoke with [facility]. Message was left with RN that met with pt on 4/16 to discuss meeting and request further consoltation with pt as she has additional questions.    Plan: KARUNA KOVACS will await return call.    Yuliana Castellanos [facility]  Clinic Care Coordinator  [facility] Women's [facility] Tiara [place]  [facility]  [phone]  [email or web address]

## 2021-04-19 NOTE — PROGRESS NOTES
Teresa is a 89 year old who is being evaluated via a billable telephone visit.      What phone number would you like to be contacted at? [phone]  How would you like to obtain your AVS? Mail a copy    Assessment & Plan     Falls frequently  Weakness of both lower extremities  Compression fracture of T8 vertebra, sequela  Hypothyroidism, unspecified type  Acoustic neuroma (H)   Pressure injury of skin of sacral region, unspecified injury stage    This is a difficult situation.  The patient has clearly had a significant decline in functional status.  She clearly expresses goals of care being that of palliation rather than additional evaluation for reversible causes of her leg weakness falls and functional decline.  She is not technically thought to be a candidate for hospice cares.  However, she strongly suggest that she was not given a thorough evaluation by the hospice representative.  At this point, I think I may need the help of the palliative care team to help establish whether she truly is a candidate for hospice cares or whether there may be other nonhospice palliation interventions that might improve her quality of life.  I think that a telephone visit would be of more yield than a face-to-face visit with that team given the severe limitations in communication in face-to-face due to her hearing loss.  I was hoping that my care coordination team in the clinic could help set up a telephone consultation with palliative care to see whether they might have anything to suggest in this situation.    Again, we will continue her current levothyroxine dose and recheck at the specified interval.    Also, I would like to have another telephone visit with her to follow-up after she has a chance to meet with the palliative care team.        30 minutes spent on the date of the encounter doing chart review, history and exam, documentation and further activities per the note           No follow-ups on file.    Shravan STOKES  "MD Bk  [facility] FOREST Moore is a 89 year old who presents for the following health issues     HPI     Chief Complaint   Patient presents with     Follow Up     This is a very pleasant 89-year-old female who has experienced a significant decline in functionality over the last several months.  She has been sometime recovering from spinal fractures.  She currently has a sacral decubitus ulcer.  She is not getting out of a wheelchair independently.  The exact etiology of her bilateral leg weakness and inability to walk is unclear.  However, she makes it very clear to me, that she does not want to see specialist or have more test to figure out if there is a reversible reason for why she cannot walk.  Her primary goal is to stay in her home comfortably.  Based on that discussion at our last visit, I thought that she might be a good candidate for hospice and given the significant decline in her function over the last several months, thought that she was an appropriate candidate.  However, she was recently evaluated by a representative from hospice and thought not to be a candidate for hospice based on her stable weights.      The patient expresses disappointment in her visit with the representative from hospice.  She states that the representative was getting used to a new computer system and spent most of the time allotted to the visit attending to his computer rather than listening to her.  She felt that her needs and concerns were not adequately addressed.  She wonders if she could get a \"second chance\" to be considered for hospice or additional treatments to help her achieve her healthcare goals.    I called her recently with her thyroid function results and per our discussion, she would like to stay on her current levothyroxine dose and recheck her thyroid function test in a 2-month time interval.    Review of Systems         Objective           Vitals:  No vitals were obtained " today due to virtual visit.    Physical Exam     She sounds comfortable.  Is much easier to communicate with her by telephone then in person due to her severe hearing loss.            Phone call duration: 18 minutes

## 2021-04-20 NOTE — PROGRESS NOTES
Clinic Care Coordination Contact  [facility]/Voicemail       Clinical Data: Care Coordinator Outreach    CC SW received a voicemail from Tori [facility], returning call.    Outreach attempted x 1.  Left message on Tori's voicemail with call back information and requested return call.    Plan: CC SW will await return call.    SAMANTHA Deshpande  Clinic Care Coordinator  [facility] Tiara Oliver  [facility]  [phone]  [email or web address]

## 2021-04-21 NOTE — PROGRESS NOTES
Clinic Care Coordination Contact  Care Team Conversations    CC FERMÍN spoke with Rachel with [facility]. Discussed pt's eligibility for Hospice Care. Jami shared that a nurse went to assess pt for hospice and due to her good appetite and ability to walk a few steps it was believed that she was likely not yet ready for hospice care.     KARUNA KOVACS explained some of PCP's concerns as noted during his office visits and his belief that pt likely meets the requirement of having the life expectation of less than 6 months. Rachel explained that due to this information, she will have nurse do a return visit with pt to determine if Hospice could engage for further support. She explained the pt and her family were all interested in hospice care for pt.    Plan: Rachel will outreach to KARUNA KOVACS after nurse meeting to discuss hospice plan for pt.    Yuliana Castellanos, [facility]  Clinic Care Coordinator  [facility] Tiara [place]  [facility]  [phone]  [email or web address]

## 2021-04-21 NOTE — PROGRESS NOTES
Clinic Care Coordination Contact  Care Team Conversations    CC FERMÍN received call from Padilla with [facility]. CC FERMÍN shared that pt would like to ask additional questions from previous meeting with Hospice on 4/16. She was unable to find information in hospice system regarding 4/16 meeting.     She will call pt today to set up another meeting to discuss hospice care.     Plan: Padilla will call KARUNA KOVACS with updated information after she speaks with pt.    Yuliana Castellanos [facility]  Clinic Care Coordinator  [facility] Tiara [place]  [facility]  [phone]  [email or web address]

## 2021-04-21 NOTE — PROGRESS NOTES
Clinic Care Coordination Contact  Care Team Conversations    CC SW outreached to Tori RN with [facility], to discuss pt's request for further information on Hospice.     Tori stated she was unable to access information from 4/16 pt's meeting with Hospice or the ongoing plan for pt with Hospice care. She will send CC FERMÍN request on to someone that will be able to provide this information to CC FERMÍN and discuss Hospice meeting with pt again to further discuss services.    Plan: KARUNA KOVACS will await return call.    Yuliana Castellanos [facility]  Clinic Care Coordinator  [facility] Tiara [place]  [facility]  [phone]  [email or web address]

## 2021-04-28 NOTE — PROGRESS NOTES
Clinic Care Coordination Contact    Situation: Patient chart reviewed by care coordinator.    Background: KARUNA KOVACS working with pt to determine eligibility for Hospice Care.    Assessment: Per chart review, 4/22 pt was enrolled in Hospice Care.    Plan/Recommendations: No further outreaches will be made at this time unless a new referral is made or a change in the pt's status occurs. Patient was provided with CC FERMÍN contact information and encouraged to call with any questions or concerns.    Yuliana Castellanos [facility]  Clinic Care Coordinator  [facility] Women[facility] Tiara [place]  [facility]  [phone]  [email or web address]

## 2021-07-19 NOTE — TELEPHONE ENCOUNTER
Returned call. OK'd requested orders.  Orders will be faxed to PCP for review and signature.   Batool Vega RN

## 2021-07-19 NOTE — TELEPHONE ENCOUNTER
Reason for Call:  Home Health Care    Elder Hong with [facility] called regarding (reason for call):     Orders are needed for this patient.     Pt Provider: Ok for Hospice re-certification with current meds and treatment.    Phone Number Homecare Nurse can be reached at: [phone]    Can we leave a detailed message on this number? YES    Phone number patient can be reached at: [phone]    Best Time: Any    Call taken on 7/19/2021 at 4:06 PM by Lisa Gordon

## 2021-08-18 NOTE — PATIENT INSTRUCTIONS
I suspect the main cause of the leg redness today is venous stasis dermatitis.  As the redness is so bright and the new skin irritation has started, we will treat for a possible cellulitis.    I have requested home care to get in touch with you.  They will hopefully be able to work with hospice to cancel that order and have home care for the time being    I have also ordered a seat cushion and you should get contacted by physical therapy or occupational therapy.

## 2021-08-18 NOTE — PROGRESS NOTES
Teresa is a 89 year old who is being evaluated via a billable video visit.      How would you like to obtain your AVS? Mail a copy  If the video visit is dropped, the invitation should be resent by: Text to cell phone: L  Will anyone else be joining your video visit? No    Video Start Time: 12:30 PM    Assessment & Plan   Problem List Items Addressed This Visit     None      Visit Diagnoses     Cellulitis of lower extremity, unspecified laterality    -  Primary    Relevant Medications    cephALEXin (KEFLEX) 500 MG capsule    Other Relevant Orders    HOME CARE NURSING REFERRAL    Decubitus ulcer of coccygeal region, unspecified pressure ulcer stage        Relevant Orders    Pressure Cushion Order         Patient currently has likely venous stasis dermatitis with a possible underlying cellulitis.  Will treat with an antibiotic.  She currently has hospice but cannot get all the cares that she needs so I did place a home care referral.  Hopefully home care can discuss with hospice and coordinate so that she can get wound care for her legs that have been weeping.  I have also ordered a seat cushion and it appears that she will be contacted by physical therapy or occupational therapy.  They should call if there are any concerns       Patient Instructions   I suspect the main cause of the leg redness today is venous stasis dermatitis.  As the redness is so bright and the new skin irritation has started, we will treat for a possible cellulitis.    I have requested home care to get in touch with you.  They will hopefully be able to work with hospice to cancel that order and have home care for the time being    I have also ordered a seat cushion and you should get contacted by physical therapy or occupational therapy.        35 minutes spent on the date of the encounter doing chart review, history and exam, documentation and further activities as noted above     OSWALDO Parrish CNP  [facility]  FOREST Moore is a 89 year old who presents for the following health issues  accompanied by the nurse at [facility]:    HPI     Redness, swelling and warmth of lower legs   Weeping yesterday L>R   R leg didn't have any redness of Monday, 2 days ago.   Hospice won't help with leg wound care   No significant pain   Lots of itching   Bilateral pitting edema +2 which is a little worse than her baseline   No SOB, cough     She also has an open wound of coccyx   Hospice is caring for that the patient mainly only sits in her recliner and her wheelchair which is quite hard.  She does not sit on a cushion      Review of Systems   Detailed as above         Objective           Vitals:  No vitals were obtained today due to virtual visit.    Physical Exam   GENERAL: Healthy, alert and no distress. Sitting in recliner  EYES: Eyes grossly normal to inspection.  No discharge or erythema, or obvious scleral/conjunctival abnormalities.  RESP: No audible wheeze or cough  SKIN: Lower legs with bright erythema with scattered excoriation. +2 bilateral pitting TANMAY visualized with RN's help   NEURO: Cranial nerves grossly intact.  Mentation and speech appropriate for age.  PSYCH: Mentation appears normal, affect normal/bright, judgement and insight intact, normal speech and appearance well-groomed.            Video-Visit Details    Type of service:  Video Visit    Video End Time:12:55    Originating Location (pt. Location): [facility]    Distant Location (provider location):  [facility]     Platform used for Video Visit: Doximity  DME (Durable Medical Equipment) Orders and Documentation  No orders of the defined types were placed in this encounter.     The patient was assessed and it was determined the patient is in need of the following listed DME Supplies/Equipment. Please complete supporting documentation below to demonstrate medical necessity.      DME All Other Item(s) Documentation    List  reason for need and supporting documentation for medical necessity below for each DME item.     1. Pressure ulcer of coccyx

## 2021-08-19 NOTE — TELEPHONE ENCOUNTER
Kasi, [facility], called with update regarding patient, cellulitis and wound care.     Patient started Rx abx for cellulitis.     HC wound care assessed patient and advised hospice nurse to make sure legs are kept clean and moist, using Aquaphor oint    Hospice nurse will continue to monitor.      Also, care for wound on buttocks will continue to be managed and monitored by hospice.      Hospice nurse will follow up with PCP regarding wound and cellulitis.      Batool FLORES, RN      August 19, 2021  4:09 PM

## 2021-08-23 NOTE — TELEPHONE ENCOUNTER
Called and spoke with Clinical director with [facility] .   Explained that they were notified by the [facility] staff (different organization), requesting HC orders for this patient.      No HC orders needed at this time.     Contacted nurse Kasi with [facility] for update on patient's wound care and cellulitis.     Requesting lab orders due to lower ext edema (OK to order due to not being related to patient's hospice Dx), increased fatigue and weakness    BMP (Pended)    Fax order to [facility]:  [phone]  Attn:  Cathryn FLORES, RN      August 23, 2021  5:20 PM

## 2021-08-23 NOTE — TELEPHONE ENCOUNTER
[facility] coordinator  [phone]    Need a physical signature on Home Health orders faxed to above number.    Verbal order given so they can start home health.     Routing to GWEN and Deric Mart RN

## 2021-08-23 NOTE — TELEPHONE ENCOUNTER
I did not see the patient  I am perplexed at how the BMP is going to help us?  What are we looking for?

## 2021-08-24 NOTE — TELEPHONE ENCOUNTER
FYI PCP    Client has revoked from hospice and will pursue homecare.    [phone] - Elder Hong, [facility] home care.    Lala Garrett RN

## 2021-08-24 NOTE — TELEPHONE ENCOUNTER
"Called [facility]    Patient is going back to home care, revoked from Hospice     [facility] wanted home care due to legs     Suggested BMP to check for kidney functioning, said if labs are off enough, hospice could manage. But this is \"now a moot point. She is revoked from Hospice already and going to home care\"     Patient is just going to proceed with home care     (see separate message regarding this)     Arpita ARRIAGA RN     "

## 2021-09-01 NOTE — TELEPHONE ENCOUNTER
TO PCP:   [facility] called (N[facility])     Pt calling hospice, she wants to be admitted but they need an order   Verbal order is okay    Ok to give verbal okay for hospice admission?   Arpita ARRIAGA RN

## 2021-09-01 NOTE — TELEPHONE ENCOUNTER
"FYI PCP     Ulises from [place] called     States [facility] care was closed for patient and she was supposed to be sent to home care     [facility] would not take pt for home care because she fired them several times     States they \"all need to get on the same page\"     She is going to discuss this with the nurses, and asks that we wait before giving the verbal on Hospice orders until they straighten this out     They will call us back     Arpita ARRIAGA RN    "

## 2021-09-02 NOTE — TELEPHONE ENCOUNTER
Verbal approval given for the homecare request below. Homecare/Hospice agency to fax orders for provider signature.      Approval for [facility].     Christianne Faith RN  [facility]

## 2021-09-03 NOTE — TELEPHONE ENCOUNTER
FERMÍN Montgomery at [facility] called requesting a referral for would care order.  She will have [facility] fax the order to our office.    Referral for wound care orders, that indicate the frequency that would care is needed.  [facility] [phone] fax    Faxed the following to [facility]:  face sheet  Med list  Recent visit notes.    Lala Garrett RN  [facility]

## 2021-09-03 NOTE — TELEPHONE ENCOUNTER
Reason for call:  Patient reporting a symptom    Symptom or request: edema in legs and open sores    Duration (how long have symptoms been present): since before february    Have you been treated for this before? Yes    Additional comments: Son is calling from [place] and would like a call back.     Phone Number patient can be reached at:  Other phone number:  [phone]    Best Time:  today    Can we leave a detailed message on this number:  NO    Call taken on 9/3/2021 at 4:22 PM by Maggie Santana

## 2021-09-03 NOTE — TELEPHONE ENCOUNTER
Deric:     Jossie called ([facility] intake)     They are trying to admit pt to home care     They need faxed:     1) new home care orders (current ones are too old from 8/18) - pended new referral   2) Face sheet  3) Recent H and P  4) current med list     Fax: [phone]    Arpita ARRIAGA RN

## 2021-09-03 NOTE — TELEPHONE ENCOUNTER
"Returned call to patient's son, Sohail, located in [place] with I.     States he spoke with Padilla Stewart at [facility] ().     States a \"Authorization To Share Protected Health Information\" form will be completed and signed by patient at [facility] and then they will fax to our clinic to be scanned into patient's Electronic Health Record.     Batool FLORES, RN      September 3, 2021  5:07 PM          "

## 2021-09-03 NOTE — TELEPHONE ENCOUNTER
Patient's son, Sohail, calling from [place] with questions regarding patient's wound care.     Patient currently in [place] at [facility].     No C2C on file to discuss his mother's wounds and care.      Advised Sohail to contact [facility] staff for updated information regarding HC orders that have been sent for this patient.     Patient's son stated understanding and agreement with advise/plan.        Batool FLORES, RN      September 3, 2021  3:41 PM

## 2021-09-10 NOTE — TELEPHONE ENCOUNTER
[facility] nurse called     Requesting:     PT orders - 2x/4 weeks     Verbal given on HC orders    Arpita ARRIAGA RN

## 2021-09-14 NOTE — TELEPHONE ENCOUNTER
RN form [facility] called requesting order approval for the following orders:    OT to evaluate and treat.     HHA for bathing assistance     Increase in SN frequency including wound care to 3 x a wk for 2 wks and 3 PRN visits.     Verbal approval given.    Pt has a stage 2 pressure ulcer in her buttocks. RN wants to treat with foam dressing.     Also notes venus stasis and recommends vashe solution and triamcinolone cream.       RN asked if Triamcinolone cream can be ordered to pt's pharmacy. RN asked if triage can call her later today or tomorrow for her to give name of pharmacy were medication needs to be ordered.

## 2021-09-15 NOTE — TELEPHONE ENCOUNTER
TRACEY Walsh called back. She asked if Rx for triamcinolone external ointment can be sent instead of cream for treatment of venous stasis of both legs. Rx for Triamcinolone 1 % ointment pended to preferred pharmacy. Please approve if agree with order.

## 2021-09-17 NOTE — TELEPHONE ENCOUNTER
Denise WEBB from [facility] calling    First day seeing pt.     RN noted BLE edema with redness and large amount of flaking skin.     Previous Home care RN reported venous stasis in BLE 9-14-21; orders for triamcinolone ointment was ordered for bilateral legs. Pt did not  medication. Denise will reach out to pt family and get cream for legs.     Pt next seen by RN home car naty Monday 9-20-21.    Milagros Mart RN

## 2021-09-24 NOTE — TELEPHONE ENCOUNTER
Denise with home care called     Pt has left and right buttocks wound     Has seen her 2x     Now: both lower legs has edema     Bilateral below knee to ankle: 1 week or 10 days ago: has very red lower leg, puffy and scaly/flaky skin     Redness all the way around - seems to touch     Not extremely swollen,  both +1, no wrapping but is elevating legs, prior nurse noted weeping but she doesn't notice that     Close to same on both sides     Expressing pain 3/10     States provider had ordered ointment but could not find ointment, had shipped it and should have arrived. But pt definitely does not have it (triamcinolone)     99 F and runny nose yesterday     Today - temp was normal 98.4 and no nasal congestion     Aquafor is not improving leg      Recommended visit in next 3 days, sooner if worsening     Noted pt saw team for leg concerns in August, she is unsure what they look like compared to     Pt is at [facility] and they arrange appointments for patient per Denise  [phone]     Called [facility]   1) to advise visit soon   2) to discuss leg concerns/see if her legs seem different from what they were in August after team saw her     No answer. Left message asking them to call back     Arpita ARRIAGA RN      Reason for Disposition    MILD swelling of both ankles (i.e., pedal edema) AND new onset or worsening    Additional Information    Negative: Chest pain    Negative: Small area of swelling and followed an insect bite to the area    Negative: Followed a knee injury    Negative: Ankle or foot injury    Negative: Pregnant with leg swelling or edema    Negative: Sounds like a life-threatening emergency to the triager    Negative: Difficulty breathing at rest    Negative: Entire foot is cool or blue in comparison to other side    Negative: SEVERE swelling (e.g., swelling extends above knee, entire leg is swollen, weeping fluid)    Negative: Thigh or calf pain and only 1 side and present > 1 hour    Negative:  Thigh, calf, or ankle swelling in only one leg    Negative: Thigh, calf, or ankle swelling in both legs, but one side is definitely more swollen (Exception: longstanding difference between legs)    Negative: Cast on leg or ankle and has increasing pain    Negative: Can't walk or can barely stand (new onset)    Negative: Fever and red area (or area very tender to touch)    Negative: Patient sounds very sick or weak to the triager    Negative: Swelling of face, arm or hands (Exception: slight puffiness of fingers during hot weather)    Negative: Pregnant > 20 weeks and sudden weight gain (i.e., > 2 lbs, 1 kg in one week)    Negative: MODERATE swelling of both ankles (e.g., swelling extends up to the knees) AND new onset or worsening    Negative: Difficulty breathing with exertion AND worsening or new onset    Negative: Looks like a boil, infected sore, deep ulcer, or other infected rash (spreading redness, pus)    Negative: Patient wants to be seen    Protocols used: LEG SWELLING AND EDEMA-A-OH

## 2021-09-28 NOTE — TELEPHONE ENCOUNTER
Follow up: no appointment has been scheduled and [facility] nurse has not called back     Called [facility] - detailed message left asking them to call back     Called Denise with [facility] and left her a detailed message to call back     Arpita ARRIAGA RN

## 2021-09-28 NOTE — TELEPHONE ENCOUNTER
Minnie called back from [facility]     Forwarded message to floor nurses last week but no one called back     They have POA for healthcare - Deboarh Bates [phone] - she arranges pt's visits     Doing flip hose but no other updates noted from the LTC nurses     Left message asking Deborah to call back     Arpita ARRIAGA RN

## 2021-09-29 NOTE — TELEPHONE ENCOUNTER
Dr. Bourgeois please review requested orders for numbers 1, 3 and 5 noted below. Verbal orders were given for 2 and 4.   Triage to call home care back with orders for 1,3,5..      1) patient's lower extremities are terrible new ulcers forming with pus. Wound care order. Wound beds filled with slough no sign of infection yet. Cleanse both lower legs cleanse with soap and water or wound cleanser.   Skin prep to mark wounds.   Apply Opticel AG to open areas on lower legs.   Xeroform to superficial excoriated areas.  TMC ointment to intact tissue of lower extremities.   Cover with ABD pads and apply Kerlix.  Change 3 times a week and as needed for drainage or dislodgement    2) Verbal order given for medical social worker for living resources and ongoing support. In assisted living with few services.   Would benefit from long term care.     3) Recommendations for bowel management? Having 1 BM per week. Today had a large, firm size of baseball today. Strained to have BM today.     4) Verbal order for increase skilled nurse visits to 3 times a week for the next 2 weeks, wound care, bowel management and med management.     5) Lymphedema. Weeping from lower extremities. Won't wear TEDS or compression garments.   RN wants to see if patient will tolerate short stretch.   wrapping.   Jami Iniguez RN

## 2021-09-29 NOTE — TELEPHONE ENCOUNTER
I approve of requested home care orders.    Can use over the counter miralax 17 g in 8 oz of water once daily as needed for bowels     Shravan Bourgeois MD

## 2021-09-30 NOTE — TELEPHONE ENCOUNTER
Left detailed message on [facility] voice mail with approval for home care orders and with recommendation for patient's bowel program. Jami Iniguez RN

## 2021-09-30 NOTE — TELEPHONE ENCOUNTER
I'm a little confused, I thought the patient was enrolled in hospice?  Is she not?  The home care RN has concerns, but the patient's POA does not want to schedule an appointment for the patient?  Can the home care team help her schedule an appointment and help with transport, or does our care coordinator need to get involved?  Please inform me if a care coordination referral is needed.

## 2021-09-30 NOTE — TELEPHONE ENCOUNTER
"Called Deborah     She said \"is this an emergency. Don't call me if this is not an emergency\" and immediately hung up on triage     FYI PCP unsure how to arrange an appointment for this patient as family does not want to be contacted    Arpita RARIAGA RN    "

## 2021-10-01 NOTE — TELEPHONE ENCOUNTER
Daisy from Lymph OT  Calling for orders.Gave verbal for Eval and Treat for Lymp OT   
Comment: Left distal dorsal forearm, left dorsal hand- both superficial tx with EDC
Detail Level: Simple
Comment: Right mid forearm, right distal dorsal forearm, left inferior preauricular cheek
Comment: Includes left arm use emollients bid until f/u and reevaluate
Comment: Lesion of concern

## 2021-10-01 NOTE — TELEPHONE ENCOUNTER
Sp[facility] with [facility]     [facility]     She is happy to visit patient at home and help any way possible but agrees that Care Coordination should be involved     Pended Care Coordination referral     Arpita ARRIAGA RN

## 2021-10-04 NOTE — TELEPHONE ENCOUNTER
Call from RN requesting to decrease skilled nursing visit to:     Skilled Nursin x week for 2 weeks.    Rn relayed verbal approval.

## 2021-10-04 NOTE — PROGRESS NOTES
Clinic Care Coordination Contact  [facility]/Voicemail       Clinical Data: Care Coordinator Outreach  Outreach attempted x 1.  Left message on patient's voicemail with call back information and requested return call.  Plan: Care Coordinator will try to reach patient again in 1-2 business days.    [facility]   Annamarie Jennings RN, Care Coordinator   [facility] [place], Women's Clinic, [facility], [place]  E-mail [email or web address]   [phone]

## 2021-10-05 NOTE — TELEPHONE ENCOUNTER
Received refquesd for Lymph OT from [facility]  1 w1  2w4   For lymphedema treatments  Verbal approval given for the homecare request below. Homecare/Hospice agency to fax orders for provider signature.      Jossie Gregory, RN  [facility] RN Triage Team    Teresa   [phone]

## 2021-10-06 NOTE — PROGRESS NOTES
Clinic Care Coordination Contact    Clinic Care Coordination Contact  OUTREACH    Referral Information:            No chief complaint on file.       Universal Utilization:      Utilization    Hospital Admissions  1             ED Visits  1             No Show Count (past year)  0                Current as of: 10/6/2021  6:22 AM              Clinical Concerns:  Current Medical Concerns:  Bilateral lower extremity swelling  Current Behavioral Concerns: None    Education Provided to patient:         Health Maintenance Reviewed:    Clinical Pathway: None    Medication Management:  Medication review status: Medications reviewed and no changes reported per patient.             Functional Status:       Living Situation:       Lifestyle & Psychosocial Needs:    Social Determinants of Health     Tobacco Use: Medium Risk     Smoking Tobacco Use: Former Smoker     Smokeless Tobacco Use: Never Used   Alcohol Use:      Frequency of Alcohol Consumption:      Average Number of Drinks:      Frequency of Binge Drinking:    Financial Resource Strain: Low Risk      Difficulty of Paying Living Expenses: Not hard at all   Food Insecurity: No Food Insecurity     Worried About Running Out of Food in the Last Year: Never true     Ran Out of Food in the Last Year: Never true   Transportation Needs: No Transportation Needs     Lack of Transportation (Medical): No     Lack of Transportation (Non-Medical): No   Physical Activity:      Days of Exercise per Week:      Minutes of Exercise per Session:    Stress:      Feeling of Stress :    Social Connections:      Frequency of Communication with Friends and Family:      Frequency of Social Gatherings with Friends and Family:      Attends Moravian Services:      Active Member of Clubs or Organizations:      Attends Club or Organization Meetings:      Marital Status:    Intimate Partner Violence:      Fear of Current or Ex-Partner:      Emotionally Abused:      Physically Abused:      Sexually  Abused:    Depression: Not at risk     PHQ-2 Score: 0   Housing Stability:      Unable to Pay for Housing in the Last Year:      Number of Places Lived in the Last Year:      Unstable Housing in the Last Year:                             Resources and Interventions:  Current Resources:                                    Goals:       Patient/Caregiver understanding:              Plan:

## 2021-10-18 NOTE — TELEPHONE ENCOUNTER
Clinic Care Coordination Contact    Clinic Care Coordination Contact  OUTREACH    Referral Information:  Referral received from PCP    Chief Complaint   Patient presents with     Clinic Care Coordination - Initial        Universal Utilization:   [facility]    Clinical Concerns:  Current Medical Concerns:  Bilateral lower extremity edema    Current Behavioral Concerns: Advanced age    Education Provided to patient: NAZARIO     Health Maintenance Reviewed:    Health Maintenance Topics with due status: Overdue       Topic Date Due    COVID-19 Vaccine Never done    ZOSTER IMMUNIZATION 09/05/2011    MEDICARE ANNUAL WELLNESS VISIT 08/22/2020    DTAP/TDAP/TD IMMUNIZATION 07/11/2021    INFLUENZA VACCINE Never done       Clinical Pathway: None    Medication Management:  Medication review status: Medications reviewed and no changes reported per patient.        Current Outpatient Medications   Medication     acetaminophen (TYLENOL) 325 MG tablet     calcium-vitamin D (CALTRATE) 600-400 MG-UNIT per tablet     levothyroxine (SYNTHROID/LEVOTHROID) 75 MCG tablet     Multiple Vitamins-Minerals (CENTRUM SILVER 50+WOMEN PO)     triamcinolone (KENALOG) 0.1 % external ointment     No current facility-administered medications for this visit.      Functional Status:  Not assessed    Living Situation:  Resides at [facility] and has [facility] home care RN/OT lymphedema services      Lifestyle & Psychosocial Needs:    Social Determinants of Health     Tobacco Use: Medium Risk     Smoking Tobacco Use: Former Smoker     Smokeless Tobacco Use: Never Used   Alcohol Use:      Frequency of Alcohol Consumption:      Average Number of Drinks:      Frequency of Binge Drinking:    Financial Resource Strain: Low Risk      Difficulty of Paying Living Expenses: Not hard at all   Food Insecurity: No Food Insecurity     Worried About Running Out of Food in the Last Year: Never  true     Ran Out of Food in the Last Year: Never true   Transportation Needs: No Transportation Needs     Lack of Transportation (Medical): No     Lack of Transportation (Non-Medical): No   Physical Activity:      Days of Exercise per Week:      Minutes of Exercise per Session:    Stress:      Feeling of Stress :    Social Connections:      Frequency of Communication with Friends and Family:      Frequency of Social Gatherings with Friends and Family:      Attends Moravian Services:      Active Member of Clubs or Organizations:      Attends Club or Organization Meetings:      Marital Status:    Intimate Partner Violence:      Fear of Current or Ex-Partner:      Emotionally Abused:      Physically Abused:      Sexually Abused:    Depression: Not at risk     PHQ-2 Score: 0   Housing Stability:      Unable to Pay for Housing in the Last Year:      Number of Places Lived in the Last Year:      Unstable Housing in the Last Year:          Resources and Interventions:  Current Resources:   -Assisted Living and Home Care Services    Goals:    -NA    Patient/Caregiver understanding: Yes    Plan:   No further care coordination at this time. Patient will contact the care team with questions, concerns, support needs. Patient will use the clinic as a resource and understands they can contact the [facility] with 24/7 after hours services available. Care Coordinator will remain available as needed.    [facility]   Annamarie Jennings RN, Care Coordinator   [facility] [place], Women's Clinic, [facility], [place]  E-mail [email or web address]   [phone]

## 2021-10-19 NOTE — TELEPHONE ENCOUNTER
Verbal approval given for the homecare request below. Homecare/Hospice agency to fax orders for provider signature.    Increase would care 3x per week.  2x per one week.     Christianne Faith RN  -[facility]

## 2021-11-04 NOTE — TELEPHONE ENCOUNTER
[facility], [phone]  Calling with orders for OT  Verbal OK given, per standing orders.  Form will be faxed to PCP to review, sign, and complete.

## 2021-12-02 NOTE — TELEPHONE ENCOUNTER
Shae PT lymphedema therapist from [facility] calling requesting verbal orders for 2 additional visits next week for discharge planning and compression socks.    Verbal orders for homecare given for above and per protocol    Tea LUA RN  EP Triage

## 2021-12-10 PROBLEM — J12.82 PNEUMONIA DUE TO 2019 NOVEL CORONAVIRUS: Status: ACTIVE | Noted: 2021-01-01

## 2021-12-10 PROBLEM — R09.02 HYPOXIA: Status: ACTIVE | Noted: 2021-01-01

## 2021-12-10 PROBLEM — R41.82 ALTERED MENTAL STATUS, UNSPECIFIED ALTERED MENTAL STATUS TYPE: Status: ACTIVE | Noted: 2021-01-01

## 2021-12-10 PROBLEM — U07.1 PNEUMONIA DUE TO 2019 NOVEL CORONAVIRUS: Status: ACTIVE | Noted: 2021-01-01

## 2021-12-11 NOTE — H&P
[facility]    History and Physical - Hospitalist Service       Date of Admission:  12/10/2021    Assessment & Plan      Teresa Bates is a 89 year old female admitted on 12/10/2021. She presents to the emergency department with decreasing level of interactivity, found to be hypoxic.  Known COVID-19 diagnosis since 12/3/2021    # Confirmed COVID-19 infection    # Acute Hypoxic Respiratory Failure secondary to COVID-19 infection  { System Guidelines    Hospital Med Guidelines    EBM Website   Contact [email or web address] to update note content   :[id number]}   Symptom Onset Unknown possibly a few days prior to 12/3 as she reportedly had a positive antigen test at that time   Date of 1st Positive Test 12/6/2021   Vaccination Status  not vaccinated       - Admit to medical floor with continuous pulse ox, COVID-19 special precautions  - Oxygen: continue oxygen support; titrate to keep SpO2 between 90-96%  - Labs: labs notable for CRP 80, undetectable procalcitonin, D-dimer of 1   - Imaging: no additional imaging needed at this time.  CT abdomen pelvis performed to evaluate hematuria, though no evidence of stone.  - Breathing treatments: no inhalers needed; avoid nebulizers in favor of MDIs   - IV fluids: not indicated at this time; diet as tolerated  - Antibiotics: not indicated   - COVID-Focused Medications: Dexamethasone 6 mg x 10 days or until hospital discharge, started on 12/10/2021.  Remdesivir initiated 12/11/2021. patient did receive monoclonal antibody treatments 12/10/2021.  - DVT Prophylaxis: at high risk of thrombotic complications due to COVID-19 (DDimer = 1.01 ug/mL FEU (Ref range: 0.00 - 0.50 ug/mL FEU) ).          - Subcutaneous Lovenox 40 mg daily        - consider anticoag on discharge for 30 days & until return to normal mobility    Septic encephalopathy in the setting of COVID-19 pneumonia: Could potentially also be encephalopathy secondary to systemic hypoxia.  At  baseline, patient is conversant; apparently was able to have a conversation regarding risks and benefits of hospitalization on the ninth.  -Treatment of COVID-19 hypoxia as above  -Not currently appropriate for occupational therapy, though can consider as mental status improves    Goals of care planning: Patient historically on hospice following hip fracture, has since graduated from hospice.  During recent 12/9/2021 discussion with care provider, she appears to have refused hospitalization for COVID-19 preferring instead for hospice enrollment if she worsened.  Patient not decisional currently, her daughter reports that she would want initial treatment for COVID-19 with oxygen and medications, though would not escalate to level of BiPAP.  If patient gets to this point of respiratory failure, would focus on comfort and involve hospice team per my discussion with daughter.  Seems consistent with prior goals of care.    Sacral and coccygeal skin breakdown and pressure ulceration: Appears to be accommodation of pressure ulceration as well as wounds associated with incontinence.  Pressure ulceration appears stage II with skin tears  -Wound nurse consulted        Diet:  Regular.diet as tolerated  DVT Prophylaxis: Subcutaneous Lovenox  Saravia Catheter: Not present  Central Lines: None  Code Status: No CPR- Do NOT Intubate      Clinically Significant Risk Factors Present on Admission                   Disposition Plan   Expected Discharge:  likely 5 days  Anticipated discharge location:  Awaiting care coordination huddle.  Anticipate prior care facility  Delays:            The patient's care was discussed with the Patient, Dr Martin in the emergency department.    Nicolas Ren MD  [facility]  Securely message with the Vocera Web Console (learn more here)  Text page via [employer] Paging/Directory        ______________________________________________________________________    Chief Complaint  "  Decreased level of interactivity, shortness of breath    History is obtained from chart review, discussion with Dr. Martin to the emergency department, discussion with patient's daughter, Deborah, over the telephone.  Reviewed outside records as well including patient previously being followed by hospice team following November 2020 hip fracture.  Patient is unable to provide any meaningful history at this time other than that she \"is freezing\" in a cold ER room.    History of Present Illness   Teresa Bates is a 89 year old female who presents to the emergency department with cough, decreased level of interactivity at her care facility.  At baseline, she has some cognitive impairment, though not dementia per report of her daughter.  It appears that she has been able to participate in meaningful conversations with care team as recently as 12/9/2021.    Patient is not vaccinated against COVID-19.  Her daughter tells me that she is often stubborn and refuses cares.  She apparently had symptoms of a cough sometime at the beginning of December.  There is a report of positive antigen test on 3 December, confirmed PCR test on 6 December.  Patient continued to decline at her assisted living, declined hospitalization.  She appeared quite ill on 9 December prompting her care provider to order monoclonal antibodies.  She received these on December 10.  Declined hospitalization again on 9 December and reportedly wanted to be enrolled in hospice if she worsened.  Since then, she has continued to decline, now brought to the emergency department with hypoxia and decreased level of interactivity.  I did discuss with patient's daughter regarding her goals of care as patient is not decisional at this time including her prior enrollment in hospice and recent reported desires to enroll in hospice if she declined.  Daughter tells me that patient would actually want initial treatment with enrollment in hospice if cares were " escalated to the point of BiPAP.  Patient is not able to participate in this conversation any degree.    Hematuria noted on UA.  Straight catheterize sample, though in discussion with nursing staff, catheter placement was not difficult.  CT abdomen pelvis without evidence of stone, so likely traumatic in nature.    Patient has been able to be supported with nasal cannula oxygen.  Initiated on Decadron, remdesivir.  Patient would not want intubation or resuscitation per prior POLST as well discussion with daughter.  No escalation to BiPAP.    Review of Systems    Review of systems not obtained due to patient factors - confusion    Past Medical History    I have reviewed this patient's medical history and updated it with pertinent information if needed.   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  Past Surgical History:   Procedure Laterality Date     Stereotactic radiation to acustic neuroma on left with Dr. Galindo Left 05/10/200       Social History   I have reviewed this patient's social history and updated it with pertinent information if needed.  Social History     Tobacco Use     Smoking status: Former Smoker     Packs/day: 1.00     Years: 30.00     Pack years: 30.00     Types: Cigarettes     Smokeless tobacco: Never Used   Substance Use Topics     Alcohol use: No     Alcohol/week: 0.0 standard drinks     Drug use: No       Family History   I have reviewed this patient's family history and updated it with pertinent information if needed.  Family History   Problem Relation Age of Onset     Family History Negative Mother      Myocardial Infarction Father      Multiple Sclerosis Daughter      Osteoporosis Daughter        Prior to Admission Medications   Prior to Admission  Medications   Prescriptions Last Dose Informant Patient Reported? Taking?   Multiple Vitamins-Minerals (CENTRUM SILVER 50+WOMEN PO)  Son Yes No   Sig: Take 1 tablet by mouth daily (before lunch)    acetaminophen (TYLENOL) 325 MG tablet   Yes No   Sig: Take 650 mg by mouth 4 times daily as needed    calcium-vitamin D (CALTRATE) 600-400 MG-UNIT per tablet  Son Yes No   Sig: Take 1 tablet by mouth daily (before lunch)    levothyroxine (SYNTHROID/LEVOTHROID) 75 MCG tablet   No No   Sig: Take 1 tablet (75 mcg) by mouth daily   triamcinolone (KENALOG) 0.1 % external ointment   No No   Sig: Apply topically 2 times daily      Facility-Administered Medications: None     Allergies   No Known Allergies    Physical Exam   Vital Signs:     BP: (!) 142/72 Pulse: 97     SpO2: 97 %        General Appearance: Frail appearing 89-year-old female.  She appears encephalopathic, though in no acute distress.  Eyes: No scleral icterus or injection  HEENT: Wasting of muscles of mastication, atraumatic  Respiratory: Breath sounds with poor effort, scattered crackles throughout lung fields.  Cardiovascular: Regular rate and rhythm with heart rate in the upper 90 range.  GI: Abdomen thin, soft, no palpable mass.  No guarding or appreciable tenderness.  Patient unable to participate in this exam  Lymph/Hematologic: No lower extremity edema  Genitourinary: Purwick catheter in place with straw yellow urine with slight settling blood in canister at bedside.  Perianal skin tears with some erythema surrounding coccyx/sacrum  Skin: Skin breakdown surrounding coccyx/sacrum concerning for incontinence and pressure injuries.  No jaundice  Musculoskeletal: Diffuse muscular and subcutaneous fat wasting in all extremities.  Neurologic: Patient is awake, though generally not able to participate meaningfully in conversation.  She reports that she is freezing, and ER room is cold.  Is not able to provide any history.  Psychiatric: Unable to assess.  Appears  encephalopathic    Data   Data reviewed today: I reviewed all medications, new labs and imaging results over the last 24 hours. I personally reviewed the chest x-ray image(s) showing Bilateral infiltrates, bibasilar predominance.    Recent Labs   Lab 12/10/21  2029   WBC 8.1   HGB 14.0   MCV 93         POTASSIUM 3.6   CHLORIDE 100   CO2 26   BUN 15   CR 0.56   ANIONGAP 7   AMAN 8.9   GLC 99   ALBUMIN 2.9*   PROTTOTAL 8.0   BILITOTAL 0.7   ALKPHOS 75   ALT 28   AST 34

## 2021-12-11 NOTE — PROGRESS NOTES
[facility]  Hospitalist Progress Note for 12/11/2021       Assessment and Plan:   Teresa Bates is a 89 year old female, resident of a care facility where she was diagnosed with Covid on 12/6.On 12/10 EMS was called due to worsening weakness and fatigue, altered mental status, hypoxia in the 86% on room air. She presented to the emergency department with decreasing level of interactivity, found to be hypoxic.    Confirmed COVID-19 infection    Acute Hypoxic Respiratory Failure secondary to COVID-19 infection    Symptom Onset Unknown possibly a few days prior to 12/3 as she reportedly had a positive antigen test at that time   Date of 1st Positive Test 12/6/2021   Vaccination Status  not vaccinated           Admitted- pt in ED since last night, waiting for bed to medical floor  - continuous pulse ox, COVID-19 special precautions  - Oxygen: continue oxygen support; titrate to keep SpO2 between 90-96%  - Labs: labs notable for CRP 80, undetectable procalcitonin, D-dimer of 1   - Imaging: no additional imaging needed at this time.  CT abdomen pelvis performed to evaluate hematuria, though no evidence of stone.  - Breathing treatments: no inhalers needed; avoid nebulizers in favor of MDIs   - IV fluids: not indicated at this time; diet as tolerated  - Antibiotics: not indicated   - COVID-Focused Medications:   Dexamethasone 6 mg x 10 days or until hospital discharge, started on 12/10/2021.    Monoclonal antibody treatments 12/10/2021.  Remdesivir initiated 12/11/2021.  - DVT Prophylaxis: at high risk of thrombotic complications due to COVID-19 (DDimer = 1.01 ug/mL FEU (Ref range: 0.00 - 0.50 ug/mL FEU) ).    - Subcutaneous Lovenox 40 mg daily  - consider anticoag on discharge for 30 days & until return to normal mobility     Septic encephalopathy in the setting of COVID-19 pneumonia:   Could potentially also be encephalopathy secondary to systemic hypoxia.  At baseline, patient is conversant;  apparently was able to have a conversation regarding risks and benefits of hospitalization on the ninth.  - continue treatment of COVID-19 hypoxia as above  - Not currently appropriate for occupational therapy, though can consider as mental status improves     Goals of care planning: Patient historically on hospice following hip fracture, has since graduated from hospice.  During recent 12/9/2021 discussion with care provider, she appears to have refused hospitalization for COVID-19 preferring instead for hospice enrollment if she worsened.  Patient not decisional currently, her daughter reports that she would want initial treatment for COVID-19 with oxygen and medications, though would not escalate to level of BiPAP.  If patient gets to this point of respiratory failure, would focus on comfort and involve hospice team per my discussion with daughter.    Seems consistent with prior goals of care.     Sacral and coccygeal skin breakdown and pressure ulceration: Appears to be accommodation of pressure ulceration as well as wounds associated with incontinence.  Pressure ulceration appears stage II with skin tears  -Wound nurse consulted     Diet:  Regular.diet as tolerated  DVT Prophylaxis: Subcutaneous Lovenox  Saravia Catheter: Not present  Central Lines: None  Code Status: No CPR- Do Not intubate    Bridget Brito MD.  Hospitalist [phone] (7am -6 pm)                 Interval History:   Pt seen in ED holding, waiting for bed- [facility]. Asking for water/milk - refusing to answer questions. Discussed with RN- no concerns              Medications:                    Physical Exam:   Blood pressure 105/60, pulse 73, temperature 97.9  F (36.6  C), temperature source Temporal, resp. rate 18, SpO2 97 %, not currently breastfeeding.  Wt Readings from Last 4 Encounters:   12/16/20 54.4 kg (120 lb)   12/07/20 53.3 kg (117 lb 9.6 oz)   12/07/20 53.3 kg (117 lb 9.6 oz)   11/30/20 53.5 kg (118 lb)         Vital Sign  Ranges  Temperature Temp  Av.9  F (36.6  C)  Min: 97.9  F (36.6  C)  Max: 97.9  F (36.6  C)   Blood pressure Systolic (24hrs), Av , Min:105 , Max:148        Diastolic (24hrs), Av, Min:54, Max:86      Pulse Pulse  Av.1  Min: 71  Max: 99   Respirations Resp  Av  Min: 18  Max: 18   Pulse oximetry SpO2  Av.9 %  Min: 90 %  Max: 97 %       No intake or output data in the 24 hours ending 21 0859  Seen in ED  Constitutional: Confused, anxious, [place].Awake, alert,no apparent resp distress, has O2 via NC   Lungs: Diminished toauscultation bilaterally, no crackles or wheezing heard   Cardiovascular: Regular rate and rhythm, normal S1 and S2, and no murmur noted   Abdomen: Normal bowel sounds, soft, non-distended, non-tender   Skin: No rashes, no cyanosis, no edema               Data:   All laboratory data reviewed

## 2021-12-11 NOTE — ED TRIAGE NOTES
From nursing home/assisted living. Diagnosed with COVID 12/6/21, now EMS brings patient in due to weakness, altered mental status (unknown when mentation changed), hypoxia, fatigue. Patient was 86% SpO2 RA for EMS, was placed on 6L O2 via mask en route. Patient has POA, who EMS reports was contacted about ER visit.

## 2021-12-11 NOTE — PROGRESS NOTES
RECEIVING UNIT ED HANDOFF REVIEW    ED Nurse Handoff Report was reviewed by: Swapna French RN on December 11, 2021 at 1:17 PM

## 2021-12-11 NOTE — ED PROVIDER NOTES
History     Chief Complaint:  Altered Mental Status and Covid Concern.       The history is provided by the EMS personnel and medical records. History limited by: Altered Mental Status.      Teresa Bates is a 89 year old female,  who presents with altered mental status and covid concern. Patient was diagnosed with Covid on 12/6 at her care facilty. She has had a cough for an unclear amount of time. EMS was called today due to worsening weakness and fatigue, altered mental status, hypoxia in the 86% on room air. Medical placed on 6 L on oxygen for transport, she is not normally on oxygen. Her medical charts shows that she has cognitive impairment. Family denies history of dementia. She has a history of CKD. She lives in assisted living/nursing home facility.       Review of Systems   Unable to perform ROS: Mental status change   Constitutional: Positive for fatigue.   Respiratory: Positive for shortness of breath.    Neurological: Positive for weakness.   All other systems reviewed and are negative.      Allergies:  No Known Allergies    Medications:    Synthroid     Past Medical History:    Acoustic neuroma   Hypothyroidism   Osteopenia   Cognitive impairment   Physical conditioning   Constipation   Bilateral pleural effusion   Stage 1 CKD   L1 lumbar vertebra compression fracture   Aortic valve stenosis   Pulmonary nodule s  Rhabdomyolysis   Heart murmur     Past Surgical History:    Patient unable to able to mention past surgical history.     Family History:    Father - MI   Daughter - MS, osteoporosis     Social History:  Patient presents to the ED alone via EMS  Lives in assisted living   Hx of tobacco use (former smoker)    Physical Exam     Patient Vitals for the past 24 hrs:   BP Pulse SpO2   12/10/21 2200 (!) 142/72 97 97 %   12/10/21 2130 (!) 145/71 99 96 %   12/10/21 2100 (!) 146/80 98 96 %   12/10/21 2030 (!) 140/70 98 95 %   12/10/21 2000 (!) 144/75 97 90 %       Physical Exam    Physical Exam    Constitutional:  Frail elderly female appears ill.   HENT:    Mucus membranes are dry.   Mouth/Throat:   Oropharynx is clear and moist.   Eyes:    Conjunctivae normal and EOM are normal. Pupils are equal, round, and reactive to light.   Neck:    No masses   Cardiovascular: Normal rate, regular rhythm and normal heart sounds.  Exam reveals no gallop and no friction rub.  No murmur heard.  Pulmonary/Chest:  Patient has no wheezes. Patient has no rales. Patient has diminished breathes sounds bilaterally and a tight cough. She is 90% oxygenation on room air.   Abdominal:   Soft. Bowel sounds are normal. Patient exhibits no mass. There is no tenderness. There is no rebound and no guarding.   Musculoskeletal:  Chronic pedal edema with dry skin.   Neurological:   Patient is generally very weak. Appears to be hard of hearing. Lethargic.   Skin:   Skin is warm and dry. No rash noted. No erythema.   Psychiatric:   Unable to assess.     Emergency Department Course   EC  ECG taken at , ECG read at   Normal sinus rhythm   Left axis deviation   Minimal voltage criteria for LVH, may be normal variant   Possible Anterior infarct, age undetermined   Abnormal ECG   Rate 96 bpm. NV interval 172 ms. QRS duration 86 ms. QT/QTc 358/452 ms. P-R-T axes -4 -64 54.     Imaging:  XR Chest Port 1 View  Final Result  IMPRESSION: Some mild scattered groundglass changes noted. Question some pleural fluid on the left.    Per radiology     Laboratory:  Labs Ordered and Resulted from Time of ED Arrival to Time of ED Departure   COMPREHENSIVE METABOLIC PANEL - Abnormal       Result Value    Sodium 133      Potassium 3.6      Chloride 100      Carbon Dioxide (CO2) 26      Anion Gap 7      Urea Nitrogen 15      Creatinine 0.56      Calcium 8.9      Glucose 99      Alkaline Phosphatase 75      AST 34      ALT 28      Protein Total 8.0      Albumin 2.9 (*)     Bilirubin Total 0.7      GFR Estimate 82     BLOOD GAS VENOUS - Abnormal    pH  Venous 7.45 (*)     pCO2 Venous 40      pO2 Venous 31      Bicarbonate Venous 28      Base Excess/Deficit (+/-) 3.4 (*)     FIO2 90     ROUTINE UA WITH MICROSCOPIC REFLEX TO CULTURE - Abnormal    Color Urine Yellow      Appearance Urine Clear      Glucose Urine Negative      Bilirubin Urine Negative      Ketones Urine 100  (*)     Specific Gravity Urine 1.024      Blood Urine Large (*)     pH Urine 6.0      Protein Albumin Urine 100  (*)     Urobilinogen Urine Normal      Nitrite Urine Negative      Leukocyte Esterase Urine Negative      Budding Yeast Urine Many (*)     Mucus Urine Present (*)     RBC Urine >182 (*)     WBC Urine 1      Squamous Epithelials Urine <1     CBC WITH PLATELETS AND DIFFERENTIAL - Abnormal    WBC Count 8.1      RBC Count 4.57      Hemoglobin 14.0      Hematocrit 42.3      MCV 93      MCH 30.6      MCHC 33.1      RDW 16.1 (*)     Platelet Count 186      % Neutrophils 84      % Lymphocytes 8      % Monocytes 7      % Eosinophils 0      % Basophils 0      % Immature Granulocytes 1      NRBCs per 100 WBC 0      Absolute Neutrophils 6.7      Absolute Lymphocytes 0.7 (*)     Absolute Monocytes 0.6      Absolute Eosinophils 0.0      Absolute Basophils 0.0      Absolute Immature Granulocytes 0.1      Absolute NRBCs 0.0     CRP INFLAMMATION - Abnormal    CRP Inflammation 80.1 (*)    LACTIC ACID WHOLE BLOOD - Normal    Lactic Acid 1.2     D DIMER QUANTITATIVE   URINE CULTURE   BLOOD CULTURE   BLOOD CULTURE     Emergency Department Course:    Reviewed:  I reviewed nursing notes, vitals, past history and care everywhere    Assessments:  2001 I obtained history and examined the patient as noted above.    I rechecked the patient and explained findings. I discussed plan for admission to the hospital.    Consults:   2008 I spoke with the patient's power of .   2152 I spoke with Dr. Ren from [facility], who accepts the patient for admission.  Interventions:  2045 NS IV   2236 Decadron 6  mg IV     Disposition:  The patient was admitted to the hospital under the care of Dr. Ren.    Impression & Plan    Medical Decision Making:  Teresa Bates is a 89 year old female presenting from her care facility with a known Covid diagnosis 4 days ago. She has gotten progressively weaker, altered in her mental status, and hypoxic on room air. On her exam she is extremely lethargic. She does answer some questions nut not reliably so. She is weak in all extremities to the point to where she requires full cares. I did speak with her daughter who is her power of  regarding this patient.she is not vaccinated. The power of  wanted her to come to the hospital for evaluation. At this point, the patient is on nasal cannula oxygen sating appropriately. Blood work shows no acute metabolic derangement, lactic is normal, white blood is also notes to be normal, but chest x ray compatible with covid pneumonia. She did receive Decadron here. She will be admitted to hospitalist.     Covid-19  Teresa Bates was evaluated during a global COVID-19 pandemic, which necessitated consideration that the patient might be at risk for infection with the SARS-CoV-2 virus that causes COVID-19.   Applicable protocols for evaluation were followed during the patient's care.   COVID-19 was considered as part of the patient's evaluation. A test was obtained at a previous visit and reviewed & considered today.      Diagnosis:    ICD-10-CM    1. Hypoxia  R09.02    2. Pneumonia due to 2019 novel coronavirus  U07.1     J12.82    3. Altered mental status, unspecified altered mental status type  R41.82        Scribe Disclosure:  I, Sal Noguera, am serving as a scribe at 8:02 PM on 12/10/2021 to document services personally performed by Claudia Martin MD based on my observations and the provider's statements to me.      Claudia Martin MD  12/10/21 3195

## 2021-12-11 NOTE — ED NOTES
[facility]  ED Nurse Handoff Report    ED Chief complaint: Altered Mental Status and Covid Concern      ED Diagnosis:   Final diagnoses:   Hypoxia   Pneumonia due to 2019 novel coronavirus   Altered mental status, unspecified altered mental status type       Code Status: DNR / DNI    Allergies: No Known Allergies    Patient Story:From nursing home/assisted living. Diagnosed with COVID 12/6/21, now EMS brings patient in due to weakness, altered mental status (unknown when mentation changed), hypoxia, fatigue. Patient has POA.  Focused Assessment:    Cardiac WDL  Resp Cough, hypoxia  Neuro A&Ox1 to person    Treatments and/or interventions provided: See MAR  Patient's response to treatments and/or interventions: Tolerated well    To be done/followed up on inpatient unit:       Does this patient have any cognitive concerns?: Disoriented to time, Disoriented to place and Disoriented to situation    Activity level - Baseline/Home:  Total Care  Activity Level - Current:   Total Care    Patient's Preferred language: English   Needed?: Yes    Isolation: COVID r/o and special precautions  Infection: COVID  Patient tested for COVID 19 prior to admission: YES  Bariatric?: No    Vital Signs:   Vitals:    12/10/21 2030 12/10/21 2100 12/10/21 2130 12/10/21 2200   BP: (!) 140/70 (!) 146/80 (!) 145/71 (!) 142/72   Pulse: 98 98 99 97   SpO2: 95% 96% 96% 97%       Cardiac Rhythm:     Was the PSS-3 completed:   Yes  What interventions are required if any?               Family Comments: Family contacted by ED MD  OBS brochure/video discussed/provided to patient/family: N/A              Name of person given brochure if not patient:                Relationship to patient:      For the majority of the shift this patient's behavior was [place].   Behavioral interventions performed were  .    ED NURSE PHONE NUMBER: [phone]

## 2021-12-11 NOTE — ED NOTES
Bed: ED20  Expected date:   Expected time:   Means of arrival:   Comments:  [place] 89f covid + lethargy

## 2021-12-11 NOTE — PHARMACY-ADMISSION MEDICATION HISTORY
Pharmacy Medication History  Admission medication history interview status for the 12/10/2021  admission is complete. See EPIC admission navigator for prior to admission medications     Location of Interview: Updated based on the patient's Medicaiton list from facility  Medication history sources: [facility] and Patient's home med list    Significant changes made to the medication list:  Added: Regen-COV MaB, Ammonium Lactate, Blink Tears, senna  Changed: none  Removed: none    Additional medication history information:   - Patient treated with MaB therapy on 12/10/2021, added to the list as one time.   - traimcinolone is not an patient's facility list, however, prescribed 9/15/2021.    Medication reconciliation completed by provider prior to medication history? No    Time spent in this activity: 30 minutes    Prior to Admission medications    Medication Sig Last Dose Taking? Auth Provider   acetaminophen (TYLENOL) 325 MG tablet Take 325 mg by mouth every 6 hours as needed for mild pain or fever  Past Month at Unknown time Yes Reported, Patient   ammonium lactate (LAC-HYDRIN) 12 % external lotion Apply topically 2 times daily Past Week at Unknown time Yes Unknown, Entered By History   calcium-vitamin D (CALTRATE) 600-400 MG-UNIT per tablet Take 1 tablet by mouth daily (before lunch)  12/10/2021 at Unknown time Yes Reported, Patient   casirivimab-imdevimab (REGEN-COV) 600-600 MG/10ML subcutanous injection Inject 1,200 mg Subcutaneous once 12/10/2021 at 1300 Yes Unknown, Entered By History   levothyroxine (SYNTHROID/LEVOTHROID) 75 MCG tablet Take 1 tablet (75 mcg) by mouth daily 12/10/2021 at Unknown time Yes Shravan Bourgeois MD   Multiple Vitamins-Minerals (CENTRUM SILVER 50+WOMEN PO) Take 1 tablet by mouth daily (before lunch)  12/10/2021 at Unknown time Yes Reported, Patient   polyethylene glycol 400 (BLINK TEARS) 0.25 % SOLN ophthalmic solution Place 1 drop into both eyes 4 times daily as needed for dry eyes Past  Week at Unknown time Yes Unknown, Entered By History   sennosides (SENOKOT) 8.6 MG tablet Take 1 tablet by mouth daily 12/10/2021 at Unknown time Yes Unknown, Entered By History   triamcinolone (KENALOG) 0.1 % external ointment Apply topically 2 times daily  Patient not taking: Reported on 12/11/2021 Not Taking at Unknown time  Shravan Bourgeois MD       The information provided in this note is only as accurate as the sources available at the time of update(s)

## 2021-12-12 NOTE — PROGRESS NOTES
Clinical Swallow Evaluation:      12/12/21 0849   General Information   Onset of Illness/Injury or Date of Surgery 12/10/21   Referring Physician Dr. Brito   Pertinent History of Current Problem   Teresa Bates is a 89 year old female, resident of a care facility where she was diagnosed with Covid on 12/6.On 12/10 EMS was called due to worsening weakness and fatigue, altered mental status, hypoxia in the 86% on room air. Admitted on 12/10 with Acute Hypoxic Respiratory Failure secondary to COVID-19 infection. SLP consulted given dysphagia symptoms.      General Observations   Pt alert, positioned upright in bed, very [place] and needed to talk into her R ear, difficulty with directions given severity of [place], perseverating on asking if she was at [facility]/who her nurse was throughout evaluation.     Past History of Dysphagia None per EMR   Pain Assessment   Patient Currently in Pain No   Type of Evaluation   Type of Evaluation Swallow Evaluation   Oral Motor   Oral Musculature unable to assess due to poor participation/comprehension  (difficult to asssess given [place])   Structural Abnormalities none present   Mucosal Quality good   Dentition (Oral Motor)   Dentition (Oral Motor) adequate dentition   Vocal Quality/Secretion Management (Oral Motor)   Vocal Quality (Oral Motor) WNL   General Swallowing Observations   Current Diet/Method of Nutritional Intake (General Swallowing Observations, NIS)   (regular but RN's holding PO for assessment since yesterday)   Respiratory Support (General Swallowing Observations) nasal cannula  (1L)   Swallowing Evaluation Clinical swallow evaluation   Clinical Swallow Evaluation   Feeding Assistance dependent   Clinical Swallow Evaluation Textures Trialed thin liquids;mildly thick liquids;pureed;soft & bite-sized   Clinical Swallow Eval: Thin Liquid Texture Trial   Mode of Presentation, Thin Liquids spoon;straw   Volume of Liquid or Food Presented 3 oz   Oral Phase of Swallow  "premature pharyngeal entry   Pharyngeal Phase of Swallow impaired;reduction in laryngeal movement;coughing/choking;throat clearing   Diagnostic Statement throat clearing/coughing x3, pt reporting \"it went down the wrong pipe!    Clinical Swallow Eval: Mildly Thick Liquids   Mode of Presentation spoon;straw   Volume Presented 4 oz   Oral Phase premature pharyngeal entry  (with consecutive sips)   Pharyngeal Phase impaired;reduction in laryngeal movement;coughing/choking   Diagnostic Statement cough x1 when pt took x3-4 consecutive sips in a row, no overt signs/sx aspiration with small single sips   Clinical Swallow Evaluation: Puree Solid Texture Trial   Mode of Presentation, Puree spoon;fed by clinician   Volume of Puree Presented x3 bites   Oral Phase, Puree WFL   Pharyngeal Phase, Puree impaired;reduction in laryngeal movement   Clinical Swallow Eval: Soft & Bite Sized   Mode of Presentation spoon;fed by clinician   Volume Presented x10 bites   Oral Phase   (slow/effortful mastication)   Pharyngeal Phase impaired;reduction in laryngeal movement;feeling of something stuck in throat   Diagnostic Statement ? globus sensation with larger bites   Swallowing Recommendations   Diet Consistency Recommendations minced & moist (level 5);mildly thick liquids (level 2)   Supervision Level for Intake 1:1 supervision needed   Mode of Delivery Recommendations slow rate of intake;bolus size, small   Swallowing Maneuver Recommendations alternate food and liquid intake   Monitoring/Assistance Required (Eating/Swallowing) stop eating activities when fatigue is present;monitor for cough or change in vocal quality with intake   Recommended Feeding/Eating Techniques (Swallow Eval) maintain upright sitting position for eating;maintain upright posture during/after eating for 30 minutes;provide assist with feeding   Medication Administration Recommendations, Swallowing (SLP) whole with puree as tolerated, crush if needed   Instrumental " "Assessment Recommendations reassess via non-instrumental clinical swallow evaluation   General Therapy Interventions   Planned Therapy Interventions Dysphagia Treatment   SLP Therapy Assessment/Plan   Criteria for Skilled Therapeutic Interventions Met (SLP Eval) yes;treatment indicated   SLP Diagnosis mild oral and supsected pharyngeal dysphagia   Rehab Potential (SLP Eval) fair, will monitor progress closely   Therapy Frequency (SLP Eval) 5 times/wk   Predicted Duration of Therapy Intervention (SLP Eval) 1 week   Comment, Therapy Assessment/Plan (SLP)   Pt presents with mild oral and supsected pharyngeal dysphagia on clinical swallow evaluation. Tangential/talkative during PO trials which likely impaired oral phase of swallow: prolonged/effortful oral transit, mastication which improved with softer/moister items. Suspect reduced oral control, likely premature bolus spillage with thin liquids, ? laryngeal penetration with throat clearing/coughing x3 and pt reporting \"it went down the wrong pipe!\" Improved control/timliness with single sips of mildly thick liquids - strong imemdiate cough following sequential sips, with pt again endorsing sensation of aspiraiton.      Therapy Plan Review/Discharge Plan (SLP)   Therapy Plan Review (SLP) evaluation/treatment results reviewed;care plan/treatment goals reviewed;risks/benefits reviewed;current/potential barriers reviewed;participants voiced agreement with care plan;participants included;patient   SLP Discharge Planning    SLP Discharge Recommendation (DC Rec) home with home care speech therapy  (return to baseline facility, SLP F/U)   SLP Rationale for DC Rec Pt below baseline for swallow function and will require post-acute SLP tx to maximize safety with least restrictive diet.  Currently pt requiring 1:1 assist with feeding, liquid/solid modifications   SLP Brief overview of current status    Recommendations: Minced/moist solids (IDDSI 5), mildly thick liquids (IDDSI 2) " when fully alert/upright. Will likely require 1:1 assist given difficulty self-feeding. Slow pacing, alternate between solids/liquids, straws OK if using small single sips (coughing observed with rapid consecutive). Hold PO if any overt difficulty or decline in respiratory status.       Total Evaluation Time   Total Evaluation Time (Minutes) 35

## 2021-12-12 NOTE — PROGRESS NOTES
[facility]  Hospitalist Progress Note for 12/12/2021          Assessment and Plan:   Teresa Bates is a 89 year old female, resident of a care facility where she was diagnosed with Covid on 12/6.On 12/10 EMS was called due to worsening weakness and fatigue, altered mental status, hypoxia in the 86% on room air. She presented to the emergency department with decreasing level of interactivity, found to be hypoxic.     Confirmed COVID-19 infection    Acute Hypoxic Respiratory Failure secondary to COVID-19 infection     Symptom Onset Unknown possibly a few days prior to 12/3 as she reportedly had a positive antigen test at that time   Date of 1st Positive Test 12/6/2021   Vaccination Status  not vaccinated            Admitted- pt in ED since last night, waiting for bed to medical floor  - continuous pulse ox, COVID-19 special precautions  - Oxygen: continue oxygen support; titrate to keep SpO2 between 90-96%  - Labs: labs notable for CRP 80, undetectable procalcitonin, D-dimer of 1   - Imaging: no additional imaging needed at this time.  CT abdomen pelvis performed to evaluate hematuria, though no evidence of stone.  - Breathing treatments: no inhalers needed; avoid nebulizers in favor of MDIs   - IV fluids: not indicated at this time; diet as tolerated  - Antibiotics: not indicated   - COVID-Focused Medications:   Dexamethasone 6 mg x 10 days or until hospital discharge, started on 12/10/2021  Monoclonal antibody treatments 12/10/2021.  Remdesivir initiated 12/11/2021 x 5 days or until hospital discharge,  - DVT Prophylaxis: at high risk of thrombotic complications due to COVID-19 (DDimer = 1.01 ug/mL FEU (Ref range: 0.00 - 0.50 ug/mL FEU) ).    - Subcutaneous Lovenox 40 mg daily  - consider anticoag on discharge for 30 days & until return to normal mobility     Septic encephalopathy in the setting of COVID-19 pneumonia: improved  Could potentially also be encephalopathy secondary to systemic  hypoxia.  At baseline, patient is conversant; apparently was able to have a conversation regarding risks and benefits of hospitalization on the ninth.  - continue treatment of COVID-19 hypoxia as above  - occupational therapy consulted      Goals of care planning: Patient historically on hospice following hip fracture, has since graduated from hospice.  During recent 12/9/2021 discussion with care provider, she appears to have refused hospitalization for COVID-19 preferring instead for hospice enrollment if she worsened.  Patient not decisional currently, her daughter reports that she would want initial treatment for COVID-19 with oxygen and medications, though would not escalate to level of BiPAP.  If patient gets to this point of respiratory failure, would focus on comfort and involve hospice team per my discussion with daughter.    Seems consistent with prior goals of care.     Sacral and coccygeal skin breakdown and pressure ulceration: Appears to be accommodation of pressure ulceration as well as wounds associated with incontinence.  Pressure ulceration appears stage II with skin tears  -Wound nurse consulted     Diet:  Regular.diet as tolerated  DVT Prophylaxis: Subcutaneous Lovenox  Saravia Catheter: Not present  Central Lines: None  Code Status: No CPR- Do Not intubate  Disposition: anticipated discharge likely om Monday if facility will take her back. Pt may need home  O2 at discharge if she qualifies.  Updated pt's son.      Bridget Brito MD.  Hospitalist [phone] (7am -6 pm)                Interval History:   no new complaints              Medications:       remdesivir  100 mg Intravenous Q24H    And     sodium chloride 0.9%  50 mL Intravenous Q24H     dexamethasone  6 mg Oral Daily     enoxaparin ANTICOAGULANT  40 mg Subcutaneous Q24H     sodium chloride (PF)  3 mL Intracatheter Q8H     acetaminophen **OR** acetaminophen, lidocaine 4%, lidocaine (buffered or not buffered), - MEDICATION INSTRUCTIONS  "-, ondansetron **OR** ondansetron, prochlorperazine **OR** prochlorperazine **OR** prochlorperazine, senna-docusate **OR** senna-docusate, sodium chloride (PF)               Physical Exam:   Blood pressure 106/56, pulse 64, temperature 97.3  F (36.3  C), temperature source Oral, resp. rate 18, height 1.651 m (5' 5\"), weight 59.9 kg (132 lb 0.9 oz), SpO2 94 %, not currently breastfeeding.  Wt Readings from Last 4 Encounters:   21 59.9 kg (132 lb 0.9 oz)   20 54.4 kg (120 lb)   20 53.3 kg (117 lb 9.6 oz)   20 53.3 kg (117 lb 9.6 oz)         Vital Sign Ranges  Temperature Temp  Av.7  F (37.1  C)  Min: 97.2  F (36.2  C)  Max: 100.8  F (38.2  C)   Blood pressure Systolic (24hrs), Av , Min:94 , Max:126        Diastolic (24hrs), Av, Min:45, Max:69      Pulse Pulse  Av.2  Min: 52  Max: 79   Respirations Resp  Av.3  Min: 18  Max: 20   Pulse oximetry SpO2  Av %  Min: 91 %  Max: 96 %         Intake/Output Summary (Last 24 hours) at 2021 1321  Last data filed at 2021 1320  Gross per 24 hour   Intake 390 ml   Output 400 ml   Net -10 ml       Constitutional: Very [place].Awake, alert, cooperative, no apparent distress   Lungs: Diminished but clear to auscultation bilaterally, no crackles or wheezing   Cardiovascular: Regular rate and rhythm, normal S1 and S2, and no murmur noted   Abdomen: Normal bowel sounds, soft, non-distended, non-tender   Skin: No rashes, no cyanosis, no edema               Data:   All laboratory data reviewed    "

## 2021-12-12 NOTE — PLAN OF CARE
Alert to self only, pleasant. Very [place] despite hearing aids. Denies pain/nausea. VSS on RA (weaned off oxygen today). A2 +lift, T/R q2 hours. Incontinent, purewick in place. Wound on buttock, covered w/ mepilex. PIV infusing D5 1/2NS +20K, int remdesivir. Possible discharge tomorrow back to care facility if okay to take COVID+ patient.

## 2021-12-12 NOTE — PLAN OF CARE
Cognitive Concerns/ Orientation : Lethargic, oriented to self, confused, not answering questions.    BEHAVIOR & AGGRESSION TOOL COLOR: green   ABNL VS/O2: Afebrile overnight; BP soft (94/45); Bradycardic at times (HR 50s); O2 sats low 90s on 1L O2 NC.    MOBILITY: Total care, turn/repo q2hr.   PAIN MANAGMENT: no nonverbal indicators of pain  DIET: Regular diet ordered, kept NPO overnight due to report of pt having trouble swallowing last evening, speech consult ordered.    BOWEL/BLADDER: incontinent of urine,  purewick in place. No BM this shift  ABNL LAB/BG: CRP 80.1, D dimer 1.01  DRAIN/DEVICES: PIV , IVF infusing.   TELEMETRY RHYTHM: n/a  SKIN: Pale, clammy, scattered bruises. Wound to bilateral buttocks, mepilex in place, WOC consulted.   TESTS/PROCEDURES: n/a  D/C DAY/GOALS/PLACE: pending improvement  OTHER IMPORTANT INFO: LS diminished, infrequent congested cough. Continues on dexamethasone, Lovenox; IV remdesivir (last dose 12/15). Speech and WOC consulted. Pulsate Mattress ordered.    IS THE PATIENT ON REMDESIVIR? DATE OF LAST SCHEDULED DOSE: Wed 12/15

## 2021-12-12 NOTE — PLAN OF CARE
DATE & TIME: 12/11/21 [id number]  Cognitive Concerns/ Orientation : A&Ox1- ELOINA- pt not answering questions verbally   BEHAVIOR & AGGRESSION TOOL COLOR: green   ABNL VS/O2: VSS on 1.5L ex temp- max 100.5  MOBILITY: Total care- turn and repo q2h  PAIN MANAGMENT: no nonverbal indicators of pain  DIET: Reg, but pt unable to be awake enough to eat/swallow. Oral cares done.  BOWEL/BLADDER: incontinent of urine- purewick in place. No BM this shift  ABNL LAB/BG: CRP 80.1, D dimer 1.01  DRAIN/DEVICES: PIV infusing D5 1/2 NS w/ k+ @ 100mL/hr  TELEMETRY RHYTHM: n/a  SKIN: Diaphoretic- brief and gown changed due to diaphoresis. Large wound on buttocks- WOC consulted. Mepilex in place for protection.  TESTS/PROCEDURES: nothing this shift  D/C DAY/GOALS/PLACE: pending improvement  OTHER IMPORTANT INFO: Suctioned with yaunker x1- thick tan secretions. Rectal tylenol given x1 for fever- effective. Speech and WOC consulted. Pulsate Mattress ordered.

## 2021-12-13 NOTE — CONSULTS
"[facility] Nurse Inpatient Wound Assessment   Reason for consultation: Evaluate and treat  Bilateral buttock wounds    Assessment  Bilateral buttock wounds due to venous congestion, IAD with a pressure component. Additionally patient is COVID positive.  Status: initial assessment    Treatment Plan  Bilateral buttock wounds: Odd days   1. Clean wound with saline or MicroKlenz Spray, pat dry  2. Wipe / \"clean\" the surrounding periwound tissue with skin prep (Cavilon No Sting Skin Prep #904918) and allow to dry. This will help protect periwound and help dressing adherence  3. Apply adaptic over any open areas .  4. Cover with Mepilex Sacral to  the area, making sure to conform nicely to skin curvatures.   5. Time and date dressing change  Pressure Injury prevention (please order supplies if not in room)  1. Turn/reposition every 1-2 hrs  2.   Float heels off bed with use of pillow or if needed Heel Lift boots (Prevalon #404105)  3.   If incontinent Cleanse with incontinent cleanser (Kevyn spray #386354) followed by skin barrier protectant (Critic Aid paste)  BID and after each incontinent episode  4.   Prevent sliding and shear by limiting HOB to 30 degrees or less unless contraindicated, use knee gatch first if not contraindicated  5.   Chair cushion pressure redistribution as needed (#291093): please limit sitting to an hour at a time  6.   Optimize nutrition   7.   Consider PULSATE low air loss mattress         Orders Written  Recommended provider order: None, at this time  WO Nurse follow-up plan:weekly  Nursing to notify the Provider(s) and re-consult the [facility] Nurse if wound(s) deteriorates or new skin concern.    Patient History  According to provider note(s):  Teresa Bates is a 89 year old female, resident of a care facility where she was diagnosed with Covid on 12/6.On 12/10 EMS was called due to worsening weakness and fatigue, altered mental status, hypoxia in the 86% on room air. She presented to the " emergency department with decreasing level of interactivity, found to be hypoxic.       Objective Data  Containment of urine/stool: External catheter    Active Diet Order  Orders Placed This Encounter      Combination Diet Regular Diet Adult; Minced and Moist Diet (level 5); Mildly Thick (level 2)      Output:   I/O last 3 completed shifts:  In: 390 [P.O.:390]  Out: 1325 [Urine:1325]    Risk Assessment:   Sensory Perception: 2-->very limited  Moisture: 3-->occasionally moist  Activity: 1-->bedfast  Mobility: 2-->very limited  Nutrition: 2-->probably inadequate  Friction and Shear: 2-->potential problem  Jorge Score: 12                          Labs:   Recent Labs   Lab 12/13/21  0912 12/12/21  1217 12/10/21  2029   ALBUMIN  --   --  2.9*   HGB 14.0   < > 14.0   WBC 11.0   < > 8.1   CRP 33.3*   < > 80.1*    < > = values in this interval not displayed.       Physical Exam  Areas of skin assessed: focused bilateral buttock    Wound Location:  Bilateral buttock  Date of last photo: Photo 12/13 did not save  Wound History: Patient is poor historian. Arrived to facility with wounds to area    Wound Base:  Patient bilateral buttock with approximately 15cm x 9cm purple blanchable venous congestion with 3 areas of exposed dermis on R buttock measuring approximately 1.5cm x 1.5cm x 0.1cm and 1 area of exposed dermis on L buttock measuring 1.4cm x 0.5cm.      Palpation of the wound bed: normal      Drainage: scant     Description of drainage: serosanguinous     Tunneling N/A     Undermining N/A  Periwound skin: intact      Color: purple      Temperature: normal   Odor: none  Pain: mild, tender to touch    Interventions  Visual inspection and assessment completed   Wound Care Rationale Protect periwound skin, Improve absorptive capacity and Provide protection   Wound Care: done per plan of care  Supplies: discussed with RN  Current off-loading measures: Pillows under calves and Pillows  Current support surface: Standard  Atmos  Air mattress, discussed with RN about ordering Pulsate mattress  Education provided to: importance of repositioning, plan of care, Moisture management, Hygiene and Off-loading pressure  Discussed plan of care with Nurse    Shae Mccloud RN CWOCN

## 2021-12-13 NOTE — CONSULTS
Care Management Initial Consult    General Information  Assessment completed with: Children,Other,  (orion Cabrera is HCA and spoke with Deandra WEBB at [facility])  Type of CM/SW Visit: Initial Assessment    Primary Care Provider verified and updated as needed: Yes   Readmission within the last 30 days: no previous admission in last 30 days      Reason for Consult: discharge planning  Advance Care Planning: Advance Care Planning Reviewed: other (comment)   (verified with orion Cabrera.  The [facility] is faxing over the [facility])       Communication Assessment  Patient's communication style: spoken language (English or Bilingual)    Hearing Difficulty or Deaf: no   Wear Glasses or Blind: yes    Cognitive  Cognitive/Neuro/Behavioral: .WDL except  Level of Consciousness: confused  Arousal Level: opens eyes spontaneously  Orientation: disoriented to,place,time,situation  Mood/Behavior: calm,cooperative  Best Language: 0 - No aphasia  Speech: clear    Living Environment:   People in home: alone     Current living Arrangements: assisted living  [facility] of Facility: [facility]   Able to return to prior arrangements:  (The [facility] is assessig their ability to accept patient back)       Family/Social Support:  Care provided by: self,other (see comments) ([facility] staff)  Provides care for: no one, unable/limited ability to care for self     Children          Description of Support System: Supportive,Involved    Support Assessment: Adequate family and caregiver support    Current Resources:   Patient receiving home care services:       Community Resources:    Equipment currently used at home:    Supplies currently used at home:      Employment/Financial:  Employment Status:          Financial Concerns:             Lifestyle & Psychosocial Needs:  Social Determinants of Health     Tobacco Use: Medium Risk     Smoking Tobacco Use: Former Smoker     Smokeless Tobacco Use: Never Used   Alcohol Use: Not on file   Financial Resource Strain: Low Risk       "Difficulty of Paying Living Expenses: Not hard at all   Food Insecurity: No Food Insecurity     Worried About Running Out of Food in the Last Year: Never true     Ran Out of Food in the Last Year: Never true   Transportation Needs: No Transportation Needs     Lack of Transportation (Medical): No     Lack of Transportation (Non-Medical): No   Physical Activity: Not on file   Stress: Not on file   Social Connections: Not on file   Intimate Partner Violence: Not on file   Depression: Not at risk     PHQ-2 Score: 0   Housing Stability: Not on file       Functional Status:  Prior to admission patient needed assistance:   Dependent ADLs:: Ambulation-walker,Bathing,Dressing,Wheelchair-with assist  Dependent IADLs:: Cleaning,Cooking,Laundry,Shopping,Meal Preparation,Medication Management,Money Management,Transportation       Mental Health Status:  Mental Health Status: No Current Concerns       Chemical Dependency Status:  Chemical Dependency Status: No Current Concerns             Values/Beliefs:  Spiritual, Cultural Beliefs, Yazidism Practices, Values that affect care:                 Additional Information:  Writer first spoke with daughter Deborah who indicates she is patient's HCA.  Writer introduced self and explained she is speaking with [facility] to arrange patient's discharge possibly today.  Deborah said she is under the impression that patient will discharge today however is concerned because patient has a cough.    Deborah and her  report patient was open to \"[facility]\" but graduated about a month ago.  Next spoke with TRACEY Liriano at [facility] at [phone].  She reports the PCP for patient thru  [facility] states patient  has declined significantly since she contracted COVID.  Deandra reports at baseline, patient lives in an [facility] apartment, she walks with CGA and a walker.  Uses a w/c for longer distances.  Receives assistance with dressing, shower and tolieting and medication.  Is " open to [facility] home care.  Deandra reports when patient was coherent she stated she didn't want to return to the hospital. There is a POLST dated 6/21 with orders for comfort care.   Writer explained patient is requiring total assistance and asked if the staff can provide this level of care in their [facility]. Writer faxed clinical information, including RN notes for their review to [phone]  Also contacted [facility] liaison Quiana Manpreet to confirm patient was in hospice and discharged and  to ask if she would now qualify to re-enroll.    Plan:  Waiting to hear back from [facility] and TRACEY Liriano at [facility].     Update at 1315  Quiana Case, [facility] liaison determined that patient disenrolled from hospice in order to begin PT and OT back in August.  Per [facility] MD patient will qualify to enroll into hospice if she or HCA request.  Updated Dr Bentley.   [facility] care team is assessing if they can accept patient back with her level of care.      Update at 1415.  Writer spoke with daughter Deborah who is the HCA.  If [facility] can accept patient she would like to re-enroll patient into hospice.   Discussed re-enrolling into hospice with [facility] or the [facility] program. Deborah and her , who was on the phone, favoring using the [facility].  Dr Bentley and TRACEY Liriano at the [facility] updated.    Alyssa Walton, [facility] HospitalSW

## 2021-12-13 NOTE — PLAN OF CARE
Summary:   from nursing home, COVID+  DATE & TIME: 12/[id number] [id number]  Cognitive Concerns/ Orientation: oriented to self and place, confused, very [place] (hearing aid battery is dead), frustrated with cares at times  BEHAVIOR & AGGRESSION TOOL COLOR: green   ABNL VS/O2: Afebrile; Pt on 1 L of O2   MOBILITY: Total care, turn/repo q2hr.   PAIN MANAGMENT: no nonverbal indicators of pain- denied pain  DIET: minced and moist diet with mildly thickened liquids.   Pt frustrated that she can't have normal cold ice water. toital feed. Takes pills with apple sauce.  BOWEL/BLADDER: incontinent of urine,  purewick in place. No BM this shift  ABNL LAB/BG: CRP 33.3,D.dimer 0.85   DRAIN/DEVICES: PIV , IVF  discontinued.   TELEMETRY RHYTHM: n/a  SKIN: Pale, scattered bruises. Wound to bilateral buttocks, mepilex in place, WOC consulted.   TESTS/PROCEDURES: n/a  D/C DAY/GOALS/PLACE: pending placement.Possible discharge to hospice tomorrow  OTHER IMPORTANT INFO: LS diminished, frequent congested cough. Continues on dexamethasone, Lovenox; IV remdesivir . Speech and WOC consulted.    IS THE PATIENT ON REMDESIVIR? DATE OF LAST SCHEDULED DOSE: Wed 12/15

## 2021-12-13 NOTE — PLAN OF CARE
OT: orders received and chart reviewed and spoke with nursing. Pt is total cares with all ADL's from facility and plan to possible to return to facility today. No acute OT needs at this time, will complete orders

## 2021-12-13 NOTE — PLAN OF CARE
Summary:   from nursing home, COVID+  DATE & TIME: 12/12/2021 [id number]  Cognitive Concerns/ Orientation: oriented to self and place, confused, very [place] (hearing aid battery is dead), frustrated with cares at times  BEHAVIOR & AGGRESSION TOOL COLOR: green   ABNL VS/O2: Afebrile; Pt on 1 L of O2 overnight due to de stating at times .  MOBILITY: Total care, turn/repo q2hr.   PAIN MANAGMENT: no nonverbal indicators of pain- denied pain  DIET: speech ordered minched and moist diet with mildly thickened liquids.   Pt frustrated that she can have normal cold ice water, explained to pt multiple times that she is aspirating and coughing lots even when swallowing water.   BOWEL/BLADDER: incontinent of urine,  purewick in place. No BM this shift  ABNL LAB/BG: CRP 31.8, d.dimer 0.9, plugging in right lung on xray  DRAIN/DEVICES: PIV , IVF  discontinued.   TELEMETRY RHYTHM: n/a  SKIN: Pale, clammy, scattered bruises. Wound to bilateral buttocks, mepilex in place, WOC consulted.   TESTS/PROCEDURES: n/a  D/C DAY/GOALS/PLACE:  likely today if facility accepts covid patient back.  OTHER IMPORTANT INFO: LS diminished, frequent congested cough. Continues on dexamethasone, Lovenox; IV remdesivir (last dose 12/15). Speech and WOC consulted. Pulsate Mattress ordered.    IS THE PATIENT ON REMDESIVIR? DATE OF LAST SCHEDULED DOSE: Wed 12/15

## 2021-12-13 NOTE — PROGRESS NOTES
[facility]  Hospitalist Progress Note for 12/12/2021          Assessment and Plan:   Teresa Bates is a 89 year old female, resident of a care facility where she was diagnosed with Covid on 12/6.On 12/10 EMS was called due to worsening weakness and fatigue, altered mental status, hypoxia in the 86% on room air. She presented to the emergency department with decreasing level of interactivity, found to be hypoxic.     Confirmed COVID-19 infection    Acute Hypoxic Respiratory Failure secondary to COVID-19 infection     Symptom Onset Unknown possibly a few days prior to 12/3 as she reportedly had a positive antigen test at that time   Date of 1st Positive Test 12/6/2021   Vaccination Status  not vaccinated            - continuous pulse ox, COVID-19 special precautions  - Oxygen: continue oxygen support; titrate to keep SpO2 between 90-96%  - Labs: labs notable for CRP 80, undetectable procalcitonin, D-dimer of 1   - Imaging: no additional imaging needed at this time.  CT abdomen pelvis performed to evaluate hematuria, though no evidence of stone.  - Breathing treatments: no inhalers needed; avoid nebulizers in favor of MDIs   - IV fluids: not indicated at this time; diet as tolerated  - Antibiotics: not indicated   - COVID-Focused Medications:   Dexamethasone 6 mg x 10 days or until hospital discharge, started on 12/10/2021  Monoclonal antibody treatments 12/10/2021.  Remdesivir initiated 12/11/2021 x 5 days or until hospital discharge,  - DVT Prophylaxis: at high risk of thrombotic complications due to COVID-19 (DDimer = 1.01 ug/mL FEU (Ref range: 0.00 - 0.50 ug/mL FEU) ).    - Subcutaneous Lovenox 40 mg daily  - consider anticoag on discharge for 30 days & until return to normal mobility  - currently on O2 1 liter NC; wean off as able, otherwise- will arrange for O2 at the time of the discharge.     Septic encephalopathy in the setting of COVID-19 pneumonia: improved  Could potentially also be  encephalopathy secondary to systemic hypoxia.  At baseline, patient is conversant; apparently was able to have a conversation regarding risks and benefits of hospitalization on the ninth.  - continue treatment of COVID-19 hypoxia as above  - occupational therapy consulted      Goals of care planning: Patient historically on hospice following hip fracture, has since graduated from hospice.  During recent 12/9/2021 discussion with care provider, she appears to have refused hospitalization for COVID-19 preferring instead for hospice enrollment if she worsened.  Patient not decisional currently, her daughter reports that she would want initial treatment for COVID-19 with oxygen and medications, though would not escalate to level of BiPAP.  If patient gets to this point of respiratory failure, would focus on comfort and involve hospice team per my discussion with daughter.    Seems consistent with prior goals of care.     12/13- SW and myself discussed with the daughter- Deborah; the plan is to discharge her back to her facility and her daughter will re-enroll her back in Hospice Care.    Sacral and coccygeal skin breakdown and pressure ulceration: Appears to be accommodation of pressure ulceration as well as wounds associated with incontinence.  Pressure ulceration appears stage II with skin tears  -Wound nurse consulted     Diet:  Regular.diet as tolerated  DVT Prophylaxis: Subcutaneous Lovenox  Saravia Catheter: Not present  Central Lines: None  Code Status: No CPR- Do Not intubate  Disposition: anticipated discharge her back to her facility tomorrow.    Discussed with FERMÍN RN and her daughter Deborah over the phone.      Soni Bentley MD                Interval History:   Confused; hard of hearing; noted to have a wet.congested, weak cough; voice is whispered  - no chest pain  - no N/V, no abd pain  - ate 100% of her breakfast as per RN               Medications:       remdesivir  100 mg Intravenous Q24H    And      "sodium chloride 0.9%  50 mL Intravenous Q24H     dexamethasone  6 mg Oral Daily     enoxaparin ANTICOAGULANT  40 mg Subcutaneous Q24H     sodium chloride (PF)  3 mL Intracatheter Q8H     acetaminophen **OR** acetaminophen, lidocaine 4%, lidocaine (buffered or not buffered), - MEDICATION INSTRUCTIONS -, ondansetron **OR** ondansetron, prochlorperazine **OR** prochlorperazine **OR** prochlorperazine, senna-docusate **OR** senna-docusate, sodium chloride (PF)               Physical Exam:   Blood pressure 125/69, pulse 88, temperature 98.6  F (37  C), temperature source Oral, resp. rate 20, height 1.651 m (5' 5\"), weight 59.9 kg (132 lb 0.9 oz), SpO2 93 %, not currently breastfeeding.  Wt Readings from Last 4 Encounters:   21 59.9 kg (132 lb 0.9 oz)   20 54.4 kg (120 lb)   20 53.3 kg (117 lb 9.6 oz)   20 53.3 kg (117 lb 9.6 oz)         Vital Sign Ranges  Temperature Temp  Av.7  F (37.1  C)  Min: 97.2  F (36.2  C)  Max: 100.8  F (38.2  C)   Blood pressure Systolic (24hrs), Av , Min:94 , Max:126        Diastolic (24hrs), Av, Min:45, Max:69      Pulse Pulse  Av.2  Min: 52  Max: 79   Respirations Resp  Av.3  Min: 18  Max: 20   Pulse oximetry SpO2  Av %  Min: 91 %  Max: 96 %         Intake/Output Summary (Last 24 hours) at 2021 1321  Last data filed at 2021 1320  Gross per 24 hour   Intake 390 ml   Output 400 ml   Net -10 ml       Constitutional: Very [place].Awake, alert, looks tired   Lungs: Diminished but clear to auscultation bilaterally, no crackles or wheezing   Cardiovascular: Regular rate and rhythm, normal S1 and S2, and no murmur noted   Abdomen: Normal bowel sounds, soft, non-distended, non-tender   Skin: No rashes, no cyanosis, no edema               Data:   All laboratory data reviewed    "

## 2021-12-13 NOTE — PLAN OF CARE
Summary:   from nursing home, COVID+  DATE & TIME: 12/12/2021 3-11pm shift  Cognitive Concerns/ Orientation : Lethargic, oriented to self and place, confused, very [place]  BEHAVIOR & AGGRESSION TOOL COLOR: green   ABNL VS/O2: Afebrile; Bradycardic at times O2 sats low 90s on 1L O2 NC when lays down, was on RA during the day.    MOBILITY: Total care, turn/repo q2hr.   PAIN MANAGMENT: no nonverbal indicators of pain- denied pain  DIET: speech ordered minched and moist diet with mildly thickened liquids.  Reported that pt still coughed at times during eating.   BOWEL/BLADDER: incontinent of urine,  purewick in place. No BM this shift  ABNL LAB/BG: CRP 31.8, d.dimer 0.9, plugging in right lung on xray  DRAIN/DEVICES: PIV , IVF  discontinued.   TELEMETRY RHYTHM: n/a  SKIN: Pale, clammy, scattered bruises. Wound to bilateral buttocks, mepilex in place, WOC consulted.   TESTS/PROCEDURES: n/a  D/C DAY/GOALS/PLACE:  likely tomrorow if facility accepts covid patient back.  OTHER IMPORTANT INFO: LS diminished, frequent congested cough. Continues on dexamethasone, Lovenox; IV remdesivir (last dose 12/15). Speech and WOC consulted. Pulsate Mattress ordered.    IS THE PATIENT ON REMDESIVIR? DATE OF LAST SCHEDULED DOSE: Wed 12/15

## 2021-12-14 NOTE — PLAN OF CARE
Summary:   from nursing home, COVID+  DATE & TIME: 12/12/2021-12/13/21 [id number]  Cognitive Concerns/ Orientation: oriented to self and place, confused, very [place] (hearing aid battery is dead), frustrated with cares at times  BEHAVIOR & AGGRESSION TOOL COLOR: green   ABNL VS/O2: Afebrile; Pt on 1 L of O2 overnight due to de stating at times .  MOBILITY: Total care, turn/repo q2hr.   PAIN MANAGMENT: no nonverbal indicators of pain- denied pain  DIET: speech ordered minched and moist diet with mildly thickened liquids.   Pt frustrated that she can have normal cold ice water, explained to pt multiple times that she is aspirating and coughing lots even when swallowing water.   BOWEL/BLADDER: incontinent of urine,  purewick in place. No BM this shift  ABNL LAB/BG: CRP 31.8, d.dimer 0.9, plugging in right lung on xray  DRAIN/DEVICES: PIV , IVF  discontinued.   TELEMETRY RHYTHM: n/a  SKIN: Pale, clammy, scattered bruises. Wound to bilateral buttocks, mepilex in place, WOC consulted.   TESTS/PROCEDURES: n/a  D/C DAY/GOALS/PLACE:  likely 12/14 if facility accepts covid patient back.  OTHER IMPORTANT INFO: LS diminished, frequent congested cough. Continues on dexamethasone, Lovenox; IV remdesivir (last dose 12/15). Speech and WOC consulted. Pulsate Mattress ordered.  IS THE PATIENT ON REMDESIVIR? DATE OF LAST SCHEDULED DOSE: Wed 12/15

## 2021-12-14 NOTE — DISCHARGE INSTRUCTIONS
"Bilateral buttock wounds: Odd days   1. Clean wound with saline or MicroKlenz Spray, pat dry  2. Wipe / \"clean\" the surrounding periwound tissue with skin prep (Cavilon No Sting Skin Prep #[id number]) and allow to dry. This will help protect periwound and help dressing adherence  3. Apply adaptic over any open areas .  4. Cover with Mepilex Sacral to  the area, making sure to conform nicely to skin curvatures.   5. Time and date dressing change  "

## 2021-12-14 NOTE — PROGRESS NOTES
[facility]  Hospitalist Progress Note for 12/12/2021          Assessment and Plan:   Teresa Bates is a 89 year old female, resident of a care facility where she was diagnosed with Covid on 12/6.On 12/10 EMS was called due to worsening weakness and fatigue, altered mental status, hypoxia in the 86% on room air. She presented to the emergency department with decreasing level of interactivity, found to be hypoxic.     Confirmed COVID-19 infection    Acute Hypoxic Respiratory Failure secondary to COVID-19 infection     Symptom Onset Unknown possibly a few days prior to 12/3 as she reportedly had a positive antigen test at that time   Date of 1st Positive Test 12/6/2021   Vaccination Status  not vaccinated            - continuous pulse ox, COVID-19 special precautions  - Oxygen: continue oxygen support; titrate to keep SpO2 between 90-96%  - Labs: labs notable for CRP 80, undetectable procalcitonin, D-dimer of 1   - Imaging: CXR 12/10- Some mild scattered groundglass changes noted;  CT abdomen pelvis performed to evaluate hematuria (no evidence of stone)- Consolidation and air bronchograms in the lingula with mild groundglass infiltrate in the right middle lobe. Bronchiolar wall thickening in the lung bases with mucous plugging.  Mild compressive consolidation of both lower lobes.  - Breathing treatments: no inhalers needed; avoid nebulizers in favor of MDIs   - IV fluids: not indicated at this time; diet as tolerated  - Antibiotics: not indicated; procalcitonin <0.05  - COVID-Focused Medications:   Dexamethasone 6 mg x 10 days, started on 12/10/2021  Monoclonal antibody treatments 12/10/2021.  Remdesivir initiated 12/11/2021, received 3 doses so far; iv access out today, site was bleeding; will stop Remdesivir since plan is to discharge with Hospice  - DVT Prophylaxis: at high risk of thrombotic complications due to COVID-19 (DDimer = 1.01 ug/mL FEU (Ref range: 0.00 - 0.50 ug/mL FEU) ).    -  Subcutaneous Lovenox 40 mg daily while in the hospital  - currently on O2 1 liter NC; wean off as able, otherwise- will arrange for O2 at the time of the discharge.     Septic encephalopathy in the setting of COVID-19 pneumonia: improved  Could potentially also be encephalopathy secondary to systemic hypoxia.  At baseline, patient is conversant; apparently was able to have a conversation regarding risks and benefits of hospitalization on the ninth.  - continue treatment of COVID-19 hypoxia as above    Microscopic hematuria  - UA with WBCs>182; UC- negative  - CT abd/pelvis - no kidney stones   - Hb stable  - no further workup since plans to re-enroll in Hospice after discharge.     Hypothyroidism  - resumed PTA Synthroid    Goals of care planning: Patient historically on hospice following hip fracture, has since graduated from hospice.  During recent 12/9/2021 discussion with care provider, she appears to have refused hospitalization for COVID-19 preferring instead for hospice enrollment if she worsened.  Patient not decisional currently, her daughter reports that she would want initial treatment for COVID-19 with oxygen and medications, though would not escalate to level of BiPAP.  If patient gets to this point of respiratory failure, would focus on comfort and involve hospice team per my discussion with daughter.  Seems consistent with prior goals of care.     12/13- SW and myself discussed with the daughter- Deborah; the plan is to discharge her back to her facility and her daughter will re-enroll her back in Hospice Care.    Sacral and coccygeal skin breakdown and pressure ulceration: Appears to be accommodation of pressure ulceration as well as wounds associated with incontinence.  Pressure ulceration appears stage II with skin tears  -Wound nurse consulted     Diet:  Regular.diet as tolerated  DVT Prophylaxis: Subcutaneous Lovenox  Saravia Catheter: Not present  Central Lines: None  Code Status: No CPR- Do Not  "intubate  Disposition: anticipated discharge back to her facility tomorrow.    Discussed with TRACEY KOVACS.     Soni Bentley MD                Interval History:   Reports headache; confused; hard of hearing;   - still with congested, weak cough; voice is whispered  - no chest pain  - no N/V, no abd pain              Medications:       remdesivir  100 mg Intravenous Q24H    And     sodium chloride 0.9%  50 mL Intravenous Q24H     dexamethasone  6 mg Oral Daily     enoxaparin ANTICOAGULANT  40 mg Subcutaneous Q24H     sodium chloride (PF)  3 mL Intracatheter Q8H     acetaminophen **OR** acetaminophen, lidocaine 4%, lidocaine (buffered or not buffered), - MEDICATION INSTRUCTIONS -, ondansetron **OR** ondansetron, prochlorperazine **OR** prochlorperazine **OR** prochlorperazine, senna-docusate **OR** senna-docusate, sodium chloride (PF)               Physical Exam:   Blood pressure (!) 149/94, pulse 73, temperature 97.7  F (36.5  C), temperature source Axillary, resp. rate 20, height 1.651 m (5' 5\"), weight 59.9 kg (132 lb 0.9 oz), SpO2 92 %, not currently breastfeeding.  Wt Readings from Last 4 Encounters:   21 59.9 kg (132 lb 0.9 oz)   20 54.4 kg (120 lb)   20 53.3 kg (117 lb 9.6 oz)   20 53.3 kg (117 lb 9.6 oz)         Vital Sign Ranges  Temperature Temp  Av.7  F (37.1  C)  Min: 97.2  F (36.2  C)  Max: 100.8  F (38.2  C)   Blood pressure Systolic (24hrs), Av , Min:94 , Max:126        Diastolic (24hrs), Av, Min:45, Max:69      Pulse Pulse  Av.2  Min: 52  Max: 79   Respirations Resp  Av.3  Min: 18  Max: 20   Pulse oximetry SpO2  Av %  Min: 91 %  Max: 96 %         Intake/Output Summary (Last 24 hours) at 2021 1321  Last data filed at 2021 1320  Gross per 24 hour   Intake 390 ml   Output 400 ml   Net -10 ml       Constitutional: Very [place].Awake, alert, looks tired and weak   Lungs: Diminished but clear to auscultation bilaterally, no crackles or wheezing "   Cardiovascular: Regular rate and rhythm, normal S1 and S2, and no murmur noted   Abdomen: Normal bowel sounds, soft, non-distended, non-tender   Skin: No rashes, no cyanosis, no edema               Data:   All laboratory data reviewed

## 2021-12-14 NOTE — PLAN OF CARE
Summary: From nursing home, COVID+  DATE & TIME: 12/14/21 7-3 pm   Cognitive Concerns/ Orientation: Oriented to self, confused, very [place] (hearing aid battery is dead), frustrated with cares at times. Many requests. Restless/anxious. Hard to assess orientation due to not being able to communicate well with pt. She verbalizes her needs. Alert all shift.   BEHAVIOR & AGGRESSION TOOL COLOR: Green   ABNL VS/O2: VSS on RA all shift except mild HTN. 1L nasal cannula placed at the end of this shift due to oxygen running 88%.   MOBILITY: Total care, turn/repo q2hr.   PAIN MANAGMENT: C/O of headache. Tylenol x1. Cold cloth applied.   DIET: Minched and moist diet with mildly thickened liquids. Total feed. Coughing on even thickened liquids.   BOWEL/BLADDER: Incontinent of urine, purewick in place. No BM this shift  ABNL LAB/BG: CRP 24.4  DRAIN/DEVICES: PIV went bad. Removed. MD notified, ok to keep out.    TELEMETRY RHYTHM: N/A  SKIN: Flushed face, otherwise pale, clammy, scattered bruises. Wound to bilateral buttocks, mepilex in place. BLE marvin/flaky.   TESTS/PROCEDURES: None  D/C DAY/GOALS/PLACE: Tomorrow per SW.   OTHER IMPORTANT INFO: LS coarse, frequent congested cough. Continues on dexamethasone, Lovenox; Speech following. Pulsate Mattress ordered, has not arrived.   IS THE PATIENT ON REMDESIVIR? DATE OF LAST SCHEDULED DOSE: Stopped due to losing IV access.

## 2021-12-14 NOTE — PROGRESS NOTES
Care Management Follow Up    Length of Stay (days): 4    Expected Discharge Date: 12/14/2021     Concerns to be Addressed: discharge planning     Patient plan of care discussed at interdisciplinary rounds: Yes    Anticipated Discharge Disposition: Assisted Living (return to her [facility] if they can meet her needs)     Anticipated Discharge Services:  (hospice referral pending)  Anticipated Discharge DME: Oxygen    Patient/family educated on Medicare website which has current facility and service quality ratings:    Education Provided on the Discharge Plan:    Patient/Family in Agreement with the Plan: yes    Referrals Placed by CM/SW: Transportation,Hospice,Post Acute Facilities  Private pay costs discussed:     Additional Information:  [facility] will accept patient back with [facility] support.  [facility] has had a hospital bed, oxygen and dominique lift delivered to [facility] this afternoon.  Their liaison is meeting with [facility] Deborah(sister of patient) to sign paperwork.  [place] requested patient arrive by 1600.  [facility] BLS not available till 1830 and their nurse will not be there at that time. Patient requires BLS as she is intermittently on oxygen, unable to administer, is COVID + and is unable to sit up in a w/c.   Next available transport tomorrow is at 1400.  Patient's sister, Deborah, [facility] and [facility] are all aware of the discharge time.       АЛЕКСАНДР AguilarSW

## 2021-12-15 NOTE — PROGRESS NOTES
Speech Language Therapy Discharge Summary    Reason for therapy discharge:    Discharged to hospice care.    Progress towards therapy goal(s). See goals on Care Plan in Epic electronic health record for goal details.  Goals partially met.  Barriers to achieving goals:   discharge from facility.    Therapy recommendation(s):    No further therapy is recommended.     Recommend continue minced and moist diet with mildly thick liquids by spoon, 1:1 assist when alert and upright, slow pace to allow breathing breaks.  Recommend consider liberalizing diet if patient wishes for alternative foods/liquid textures at hospice care, if goals of care are comfort focused.

## 2021-12-15 NOTE — DISCHARGE SUMMARY
[facility]  Hospitalist Discharge Summary      Date of Admission:  12/10/2021  Date of Discharge:  12/15/2021  3:31 PM  Discharging Provider: Soni Bentley MD      Discharge Diagnoses   Acute hypoxic respiratory failure  Covid 19 Pneumonia  Septic encephalopathy  Microscopic hematuria  Hypothyroidism  Dysphagia         Follow-ups Needed After Discharge   Follow-up Appointments     Follow-up and recommended labs and tests       Follow up with Hospice care team.             Unresulted Labs Ordered in the Past 30 Days of this Admission     Date and Time Order Name Status Description    12/10/2021  7:39 PM Blood Culture Peripheral Blood Preliminary     12/10/2021  7:39 PM Blood Culture Peripheral Blood Preliminary           Discharge Disposition   Discharged to [facility], plan to enroll in hospice.  Condition at discharge: Guarded      Hospital Course   Teresa Bates is a 89 year old female, resident of a care facility where she was diagnosed with Covid on 12/6.On 12/10 EMS was called due to worsening weakness and fatigue, altered mental status, hypoxia in the 86% on room air. For a detailed HPI- please refer to H&P done by Dr Nicolas Ren on 12/10/2021.     Confirmed COVID-19 infection    Acute Hypoxic Respiratory Failure secondary to COVID-19 infection     Symptom Onset Unknown, possibly a few days prior to 12/3 as she reportedly had a positive antigen test at that time   Date of 1st Positive Test 12/6/2021   Vaccination Status  not vaccinated            CXR on admission- some mild scattered groundglass changes noted;  CT abdomen pelvis performed to evaluate hematuria (no evidence of stone)- consolidation and air bronchograms in the lingula with mild groundglass infiltrate in the right middle lobe. Bronchiolar wall thickening in the lung bases with mucous plugging.  Mild compressive consolidation of both lower lobes. Apparently received monoclonal antibody treatments 12/10/2021.  -  started on Decadron, Remdesivir, Lovenox, supplemental O2  - procalcitonin was <0.05; ABX not indicated  - continued to require supplemental O2    Goals of care planning: Patient historically on hospice following hip fracture, has since graduated from hospice.  During recent 12/9/2021 discussion with care provider, she appears to have refused hospitalization for COVID-19 preferring instead for hospice enrollment if she worsened.  Patient not decisional currently, her daughter reported that she would want initial treatment for COVID-19 with oxygen and medications, though would not escalate to level of BiPAP.  If patient gets to this point of respiratory failure, would focus on comfort and involve hospice team per my discussion with daughter.  Seems consistent with prior goals of care.     12/13- SW and myself discussed with the daughter- Deborah; the plan is to discharge her back to her facility and her daughter will re-enroll her back in Hospice Care.  - will complete the course of steroids after discharge.     Septic encephalopathy in the setting of COVID-19 pneumonia  Could potentially also be encephalopathy secondary to systemic hypoxia.   - family plans to enroll her in Hospice.     Microscopic hematuria  - UA with WBCs>182; UC- negative  - CT abd/pelvis - no kidney stones   - Hb stable  - no further workup since plans to re-enroll in Hospice after discharge.      Hypothyroidism  - resumed PTA Synthroid    Sacral and coccygeal skin breakdown and pressure ulceration: Appears to be accommodation of pressure ulceration as well as wounds associated with incontinence.  Pressure ulceration appears stage II with skin tears  -Wound nurse consulted      Consultations This Hospital Stay   WOUND OSTOMY CONTINENCE NURSE  IP CONSULT  SPEECH LANGUAGE PATH ADULT IP CONSULT  OCCUPATIONAL THERAPY ADULT IP CONSULT  SOCIAL WORK IP CONSULT  CARE MANAGEMENT / SOCIAL WORK IP CONSULT  SOCIAL WORK IP CONSULT    Code Status   No CPR- Do  NOT Intubate    Time Spent on this Encounter   I, Soni Bentley MD, personally saw the patient today and spent greater than 30 minutes discharging this patient.       Soni Bentley MD  [place] [address] [facility]  [address] [address]  Phone: [phone]  ______________________________________________________________________    Physical Exam   Vital Signs: Temp: 98.9  F (37.2  C) Temp src: Axillary BP: (!) 141/84 Pulse: 91   Resp: 16 SpO2: 95 % O2 Device: Nasal cannula Oxygen Delivery: 2 LPM  Weight: 132 lbs .89 oz     Constitutional: Very [place].Awake, alert, looks tired and weak   Lungs: Diminished but clear to auscultation bilaterally, wet congested, weak cough noted; no crackles or wheezing   Cardiovascular: Regular rate and rhythm, normal S1 and S2, and no murmur noted   Abdomen: Normal bowel sounds, soft, non-distended, non-tender   Skin: No rashes, no cyanosis, no edema            Primary Care Physician   Shravan Bourgeois    Discharge Orders      Reason for your hospital stay    COVID 19 Pneumonia     Follow-up and recommended labs and tests     Follow up with Hospice care team.     Activity    Your activity upon discharge: activity as tolerated     Oxygen Adult/Peds    Oxygen Documentation:   I certify that this patient, Teresa Bates has been under my care (or a nurse practitioner or physican's assistant working with me). This is the face-to-face encounter for oxygen medical necessity.      Teresa Bates is now in a chronic stable state and continues to require supplemental oxygen. Patient has continued oxygen desaturation due to COVID 19 infection.    Alternative treatment(s) tried or considered and deemed clinically infective for treatment of COVID 19 PNA include inhalers, steroids, and pulmonary toileting.  If portability is ordered, is the patient mobile within the home? yes    **Patients who qualify for home O2 coverage under the  CMS guidelines require ABG tests or O2 sat readings obtained closest to, but no earlier than 2 days prior to the discharge, as evidence of the need for home oxygen therapy. Testing must be performed while patient is in the chronic stable state. See notes for O2 sats.**     Diet    Follow this diet upon discharge: Orders Placed This Encounter      Combination Diet Regular Diet Adult; Minced and Moist Diet (level 5); Mildly Thick (level 2)       Significant Results and Procedures   Most Recent 3 CBC's:Recent Labs   Lab Test 12/15/21  0820 12/14/21  0907 12/13/21  0912   WBC 16.0* 10.2 11.0   HGB 14.7 14.5 14.0   MCV 93 92 91    278 251     Most Recent 3 BMP's:Recent Labs   Lab Test 12/15/21  0820 12/14/21  0907 12/13/21  0912    137 136   POTASSIUM 3.7 3.6 3.8   CHLORIDE 105 105 107   CO2 25 25 24   BUN 16 21 20   CR 0.51* 0.54 0.55   ANIONGAP 5 7 5   AMAN 8.2* 8.0* 7.9*   GLC 93 105* 103*     Most Recent 2 LFT's:Recent Labs   Lab Test 12/10/21  2029 01/25/21  1340   AST 34 27   ALT 28 16   ALKPHOS 75 76   BILITOTAL 0.7 0.8     Most Recent 3 INR's:No lab results found.  Most Recent 3 Hemoglobins:Recent Labs   Lab Test 12/15/21  0820 12/14/21  0907 12/13/21  0912   HGB 14.7 14.5 14.0     Most Recent 3 BNP's:No lab results found.  Most Recent D-dimer:Recent Labs   Lab Test 12/15/21  0820   DD 0.58*     Most Recent TSH and T4:Recent Labs   Lab Test 04/08/21  1556   TSH 9.05*   T4 1.06     Most Recent Hemoglobin A1c:No lab results found.  Most Recent Urinalysis:Recent Labs   Lab Test 12/10/21  2031   COLOR Yellow   APPEARANCE Clear   URINEGLC Negative   URINEBILI Negative   URINEKETONE 100 *   SG 1.024   UBLD Large*   URINEPH 6.0   PROTEIN 100 *   NITRITE Negative   LEUKEST Negative   RBCU >182*   WBCU 1     Most Recent Anemia Panel:Recent Labs   Lab Test 12/15/21  0820 03/12/21  0832 01/25/21  1340   WBC 16.0*   < > 7.8   HGB 14.7   < > 13.3   HCT 43.7   < > 41.0   MCV 93   < > 101*      < > 280   B12   --   --  585    < > = values in this interval not displayed.   ,   Results for orders placed or performed during the hospital encounter of 12/10/21   XR Chest Port 1 View    Narrative    EXAM: XR CHEST PORT 1 VIEW  LOCATION: [facility]  DATE/TIME: 12/10/2021 8:45 PM    INDICATION: COVID +. Lethargic.  COMPARISON: 11/12/2020.      Impression    IMPRESSION: Some mild scattered groundglass changes noted. Question some pleural fluid on the left.   CT Abdomen Pelvis w/o Contrast    Narrative    EXAM: CT ABDOMEN PELVIS W/O CONTRAST  LOCATION: [facility]  DATE/TIME: 12/10/2021 11:37 PM    INDICATION: Hematuria  COMPARISON: None.  TECHNIQUE: CT scan of the abdomen and pelvis was performed without IV contrast. Multiplanar reformats were obtained. Dose reduction techniques were used.  CONTRAST: None.    FINDINGS:   LOWER CHEST: Small pleural effusions. Calcified granulomata in the lungs. Consolidation and air bronchograms in the lingula with mild groundglass infiltrate in the right middle lobe. Bronchiolar wall thickening in the lung bases with mucous plugging.   Mild compressive consolidation of both lower lobes.    HEPATOBILIARY: Normal.    PANCREAS: Normal.    SPLEEN: Normal.    ADRENAL GLANDS: Normal.    KIDNEYS/BLADDER: No urinary calculi or hydronephrosis. Mild motion artifact.    BOWEL: Normal.    LYMPH NODES: Normal.    VASCULATURE: Unremarkable.    PELVIC ORGANS: Normal.    MUSCULOSKELETAL: Demineralization. Moderate compression of L1. Minimal compression of L5. Internal fixation proximal femur.      Impression    IMPRESSION:   1.  Motion artifact. No urinary calculi or hydronephrosis.    2.  Bibasilar consolidation with small pleural effusions. Lingular consolidation. Bronchiolar wall thickening in the lower lobes with mucous plugging in the right base.    3.  Demineralization. Moderate compression of L1 with mild compression of L5.           Discharge Medications    Current Discharge Medication List      START taking these medications    Details   dexamethasone (DECADRON) 6 MG tablet Take 1 tablet (6 mg) by mouth daily for 6 days  Qty: 6 tablet, Refills: 0    Associated Diagnoses: Pneumonia due to 2019 novel coronavirus         CONTINUE these medications which have NOT CHANGED    Details   acetaminophen (TYLENOL) 325 MG tablet Take 325 mg by mouth every 6 hours as needed for mild pain or fever       ammonium lactate (LAC-HYDRIN) 12 % external lotion Apply topically 2 times daily      calcium-vitamin D (CALTRATE) 600-400 MG-UNIT per tablet Take 1 tablet by mouth daily (before lunch)       levothyroxine (SYNTHROID/LEVOTHROID) 75 MCG tablet Take 1 tablet (75 mcg) by mouth daily  Qty: 90 tablet, Refills: 3    Associated Diagnoses: Hypothyroidism, unspecified type      Multiple Vitamins-Minerals (CENTRUM SILVER 50+WOMEN PO) Take 1 tablet by mouth daily (before lunch)       polyethylene glycol 400 (BLINK TEARS) 0.25 % SOLN ophthalmic solution Place 1 drop into both eyes 4 times daily as needed for dry eyes      sennosides (SENOKOT) 8.6 MG tablet Take 1 tablet by mouth daily         STOP taking these medications       casirivimab-imdevimab (REGEN-COV) 600-600 MG/10ML subcutanous injection Comments:   Reason for Stopping:         triamcinolone (KENALOG) 0.1 % external ointment Comments:   Reason for Stopping:             Allergies   No Known Allergies

## 2021-12-15 NOTE — PROGRESS NOTES
Care Management Discharge Note    Discharge Date: 12/15/2021       Discharge Disposition: Assisted Living (return to her nursing home if they can meet her needs)    Discharge Services:  (hospice referral pending)    Discharge DME: Oxygen    Discharge Transportation:  ([facility] on 12/15 at 1400)    Private pay costs discussed: transportation costs    PAS Confirmation Code:  (n/a)  Patient/family educated on Medicare website which has current facility and service quality ratings:      Education Provided on the Discharge Plan:    Persons Notified of Discharge Plans: nursing home and daughter Deborah  Patient/Family in Agreement with the Plan: yes    Handoff Referral Completed: No    Additional Information:  Discharge is occurring as planned./  Orders have been faxed to [facility] at [phone] and [facility] at [phone].  Contacted daughter Deborah to inform her of the need for her mother to transport by stretcher and the associated cost if not paid by her insurance or [facility].   She expressed concern if patient will be responsible for the cost but accepted the need for stretcher.  PCA and face sheet faxed to [facility].        Alyssa Walton, [facility] HospitalSW

## 2021-12-15 NOTE — PLAN OF CARE
Summary: From nursing home, COVID+  DATE & TIME: 12/15/21 [id number]   Cognitive Concerns/ Orientation: Oriented to self only, confused but is able to communicate needs.   BEHAVIOR & AGGRESSION TOOL COLOR: Green   ABNL VS/O2: VSS on RA all shift except mild HTN. Was on 3L, weaned to 2L O2 w/ sats 92-93%.  MOBILITY: Total care, turn/repo q2hr.   PAIN MANAGMENT: Reported a headache, PRN Tylenol x1 & ice packs placed.   DIET: Minched and moist diet with mildly thickened liquids. Total feed. Oral cares done.   BOWEL/BLADDER: Incontinent of urine,  purewick in place. No BM this shift.  ABNL LAB/BG: CRP 24.2, D-dimer 0.52  DRAIN/DEVICES: No IV access, was removed previously. MD aware, no IV is ok.  TELEMETRY RHYTHM: N/A  SKIN: Pale, scattered bruises. Wound to bilateral buttocks, mepilex in place. BLE marvin/flaky.   TESTS/PROCEDURES: None  D/C DAY/GOALS/PLACE: discharge on hospice to [facility] at 1400 today.   OTHER IMPORTANT INFO: LS diminished/crackles. Frequent, congested cough. Continues on dexamethasone & lovenox.

## 2021-12-15 NOTE — PROGRESS NOTES
Continue on dexamethasone. Disoriented to situation/time- very [place]. Speech following-Minced and moist/mild thick liquids.  Pulsate Mattress ordered, has not arrived. Will discharge tomorrow to care facility on hospice.  On 3L O2.  Turn and repo Q2hrs.

## 2021-12-15 NOTE — PLAN OF CARE
Discharge    Patient discharged to [facility] with [facility] transportation via stretcher.      Care plan note  DATE & TIME: 12- AM shift    Cognitive Concerns/ Orientation : Disoriented x 3, oriented to self only. [place], hearing aid in R ear (not helpful). Writing down questions on the paper was helpful to communicate with the pt.    BEHAVIOR & AGGRESSION TOOL COLOR: Green, anxious at times, asks where she is and asks to go home.                                       CIWA SCORE: n/a   ABNL VS/O2: VSS on 2L of oxygen via NC, sating 93%-95%. Breathing unlabored. Frequent non productive congested cough. LS diminished and coarse.   MOBILITY: Lift. T/R q2hr.   PAIN MANAGMENT: c/o of generalized aches especially in shoulders, Tylenol x 1 given. Pt was sleeping between cares.   DIET: Moist and minced diet, total feed, poor intake, likes purred fruits and juice.   BOWEL/BLADDER: incontinent of B&B; purewick in place. One BM small soft.   ABNL LAB/BG: WBC 16, CRP inf 20  DRAIN/DEVICES: no IV access   TELEMETRY RHYTHM: n/a  SKIN: Coccyx pressure injury, dressing changed. Ana area redness, barrier applied. Heels off loaded on pillows. Ricardo sandeep LE. Very stiff with her arms and legs.   TESTS/PROCEDURES: n/a  D/C DATE: today at 2  PM.   Discharge Barriers: none  OTHER IMPORTANT INFO: n/a    Listed belongings gathered and given to patient (including from security/pharmacy). Yes  Care Plan and Patient education resolved: Yes  Prescriptions if needed, hard copies sent with patient  NA  Medication Bin checked and emptied on discharge Yes  SW/care coordinator/charge RN aware of discharge: Yes

## 2021-12-16 NOTE — PROGRESS NOTES
Clinic Care Coordination Contact    Situation: Patient chart reviewed by care coordinator.    Background: Pt at [facility] due to COVID symptoms.     Assessment: Pt has been discharged back to [facility] care.     Plan/Recommendations: No outreaches will be made at this time unless a new referral is made or a change in the pt's status occurs.     Yuliana Castellanos [facility]  Clinic Care Coordinator  [facility] [address]  [facility]  [phone]  [email or web address]

## 2021-12-17 NOTE — TELEPHONE ENCOUNTER
Epinephrine not on med list (current or historical) - tried calling patient to verify need for this, phone rings and rings    Called pharmacy last Rx was from 12/9/21- Signed Milagros Christian     Pt is on hospice now per pharmacy     Routing refill request to provider for review/approval because:  Drug not active on patient's medication list

## 2022-01-01 ENCOUNTER — TELEPHONE (OUTPATIENT)
Dept: FAMILY MEDICINE | Facility: CLINIC | Age: 87
End: 2022-01-01
Payer: MEDICARE

## 2022-01-01 ENCOUNTER — MEDICAL CORRESPONDENCE (OUTPATIENT)
Dept: HEALTH INFORMATION MANAGEMENT | Facility: CLINIC | Age: 87
End: 2022-01-01
Payer: MEDICARE

## 2022-01-01 DIAGNOSIS — R29.898 WEAKNESS OF BOTH LOWER EXTREMITIES: Primary | ICD-10-CM

## 2022-01-21 NOTE — TELEPHONE ENCOUNTER
I wish it was that simple, but I am pretty sure a face to face visit would be required for that.  I will ask care coordination to get involved.  I was under the understanding she was enrolled in hospice, so that may be something hospice can arrange.  I signed the DME order in Epic, but that is most likely just tip of the ice-castillo

## 2022-01-21 NOTE — TELEPHONE ENCOUNTER
Reason for Call:  Request for prescription    Detailed comments: Son is calling requesting prescription to be sent to [facility] so they can order a lift to get patient out of bed to go to the bathroom and bathe.     Phone Number Patient can be reached at: Other phone number:  [phone]    Best Time: any    Can we leave a detailed message on this number? YES    Call taken on 1/21/2022 at 10:43 AM by Maggie Santana

## 2022-01-24 NOTE — TELEPHONE ENCOUNTER
Pt's Son Sohail is requesting orders for a lift and a bed. Hospice had been supplying equipment for pt but she was discharged from hospice, they are taking medical equipment from her.     Triage advised pt schedule follow up visit with PCP post ED discharge for update of her current health status and needs. Son reports he is in [place] and doesn't have any family that can bring pt in or do a VV.    Son was unclear what type of bed pt currently has. [facility] be called for that information. Triage left voicemail at [facility] asking staff to call triage back re: medical equipment (bed and lift) pt currently has [phone]. Does she need a hospital bed?    Waiting for call back from assistive living. Son is aware F2F visit may be needed for this order but asked triage to send request to provider.

## 2022-01-25 NOTE — TELEPHONE ENCOUNTER
CC please advise,   Patients son also called back regarding this on 1/25/22.    Izabella Pimentel RN  [facility]

## 2022-01-25 NOTE — TELEPHONE ENCOUNTER
Called [facility] at [phone]. They stated again patient is being discharged from Hospice so they will be taking their bed and lift upon discharge.  Was transferred to [facility], , and left message to call back to triage nurse regarding bed and type of lift.    Lala Garrett RN

## 2022-01-26 NOTE — TELEPHONE ENCOUNTER
Please see OV note dated 01/24/2022.    Pt has transferred her primary care to [facility] and doesn't plan to follow with Dr. Bourgeois at this time. [facility] will complete orders for hospital bed and lift.

## 2022-01-26 NOTE — TELEPHONE ENCOUNTER
CC FERMÍN spoke with TRACEY Poole at [facility]. Zulema shared that pt's care is followed by SOL Moran with [facility] (the in-house provider). Milagros will be seeing pt tomorrow and will complete orders for hospital bed and lift.     Pt has transferred her primary care to [facility] and doesn't plan to follow with Dr. Bourgeois at this time.    Plan: Pt has no further needs at this time from [facility] Care Team.    Yuliana Castellanos [facility]  Clinic Care Coordinator  [facility] [address] [address]  [facility]  [phone]  [email or web address]

## 2022-01-27 NOTE — TELEPHONE ENCOUNTER
[facility] calling to confirm as well pt had new primary care and does not need order from Dr. Bourgeois at this time. See below.     Mary JORGE RN  [facility]

## 2022-03-01 NOTE — TELEPHONE ENCOUNTER
Ella from [facility] called, just wanted to leave a message for primary care provider and let him know that patient is being admitted today 3/1/22.    For any questions, Ella may be reached at [phone].

## 2022-03-17 NOTE — TELEPHONE ENCOUNTER
[facility]     Hospice called     To report that patient passed away today at 12:25pm     Arpita ARRIAGA Triage RN  [facility]

## 2024-06-17 PROBLEM — Z71.89 ADVANCED DIRECTIVES, COUNSELING/DISCUSSION: Status: RESOLVED | Noted: 2020-11-17 | Resolved: 2024-06-17
